# Patient Record
Sex: FEMALE | Race: WHITE | NOT HISPANIC OR LATINO | Employment: OTHER | URBAN - METROPOLITAN AREA
[De-identification: names, ages, dates, MRNs, and addresses within clinical notes are randomized per-mention and may not be internally consistent; named-entity substitution may affect disease eponyms.]

---

## 2017-01-12 DIAGNOSIS — I10 ESSENTIAL HYPERTENSION, BENIGN: Primary | ICD-10-CM

## 2017-01-12 DIAGNOSIS — E78.5 HYPERLIPIDEMIA WITH TARGET LDL LESS THAN 130: ICD-10-CM

## 2017-01-12 RX ORDER — ATORVASTATIN CALCIUM 20 MG/1
20 TABLET, FILM COATED ORAL DAILY
Qty: 30 TABLET | Refills: 0 | Status: SHIPPED | OUTPATIENT
Start: 2017-01-12 | End: 2017-02-10

## 2017-01-12 RX ORDER — LISINOPRIL AND HYDROCHLOROTHIAZIDE 12.5; 2 MG/1; MG/1
2 TABLET ORAL EVERY MORNING
Qty: 60 TABLET | Refills: 0 | Status: SHIPPED | OUTPATIENT
Start: 2017-01-12 | End: 2017-02-10

## 2017-01-12 NOTE — TELEPHONE ENCOUNTER
Medication is being filled for 1 time refill only due to:  Patient needs to be seen because it has been more than one year since last visit.

## 2017-01-12 NOTE — TELEPHONE ENCOUNTER
lisinopril/HCTZ 20-12.5mg      Last Written Prescription Date: 05/18/16  Last Fill Quantity: 180, # refills: 1  Last Office Visit with MARTI, DIRK or [facility] prescribing provider: 10/06/15       POTASSIUM   Date Value Ref Range Status   10/02/2015 3.7 3.4 - 5.3 mmol/L Final     CREATININE   Date Value Ref Range Status   10/02/2015 0.75 0.52 - 1.04 mg/dL Final     BP Readings from Last 3 Encounters:   10/06/15 122/78   07/08/15 142/96   04/20/15 152/90     atorvastatin 20mg     Last Written Prescription Date: 05/18/16  Last Fill Quantity: 90, # refills: 1  Last Office Visit with MARTI, DIRK or [facility] prescribing provider: 10/06/15       CHOL      273   10/2/2015  HDL       50   10/2/2015  LDL      183   10/2/2015  TRIG      200   10/2/2015  CHOLHDLRATIO      5.5   10/2/2015

## 2017-02-10 ENCOUNTER — OFFICE VISIT (OUTPATIENT)
Dept: INTERNAL MEDICINE | Facility: CLINIC | Age: 72
End: 2017-02-10
Payer: MEDICARE

## 2017-02-10 VITALS
DIASTOLIC BLOOD PRESSURE: 76 MMHG | HEART RATE: 116 BPM | HEIGHT: 63 IN | BODY MASS INDEX: 37.69 KG/M2 | OXYGEN SATURATION: 94 % | TEMPERATURE: 98.7 F | SYSTOLIC BLOOD PRESSURE: 130 MMHG | WEIGHT: 212.7 LBS

## 2017-02-10 DIAGNOSIS — Z23 NEED FOR VACCINATION: ICD-10-CM

## 2017-02-10 DIAGNOSIS — I10 ESSENTIAL HYPERTENSION, BENIGN: Primary | ICD-10-CM

## 2017-02-10 DIAGNOSIS — Z13.29 SCREENING FOR THYROID DISORDER: ICD-10-CM

## 2017-02-10 DIAGNOSIS — E78.5 HYPERLIPIDEMIA WITH TARGET LDL LESS THAN 130: ICD-10-CM

## 2017-02-10 PROCEDURE — 99214 OFFICE O/P EST MOD 30 MIN: CPT | Mod: 25 | Performed by: INTERNAL MEDICINE

## 2017-02-10 PROCEDURE — G0009 ADMIN PNEUMOCOCCAL VACCINE: HCPCS | Performed by: INTERNAL MEDICINE

## 2017-02-10 PROCEDURE — 90670 PCV13 VACCINE IM: CPT | Performed by: INTERNAL MEDICINE

## 2017-02-10 PROCEDURE — T1013 SIGN LANG/ORAL INTERPRETER: HCPCS | Mod: U3 | Performed by: INTERNAL MEDICINE

## 2017-02-10 RX ORDER — LISINOPRIL AND HYDROCHLOROTHIAZIDE 12.5; 2 MG/1; MG/1
2 TABLET ORAL EVERY MORNING
Qty: 180 TABLET | Refills: 3 | Status: SHIPPED | OUTPATIENT
Start: 2017-02-10 | End: 2018-03-01

## 2017-02-10 RX ORDER — ATORVASTATIN CALCIUM 20 MG/1
20 TABLET, FILM COATED ORAL DAILY
Qty: 90 TABLET | Refills: 3 | Status: SHIPPED | OUTPATIENT
Start: 2017-02-10 | End: 2018-03-01

## 2017-02-10 NOTE — MR AVS SNAPSHOT
"              After Visit Summary   2/10/2017    Marilia Palma    MRN: [id number]           Patient Information     Date Of Birth          1945        Visit Information        Provider Department      2/10/2017 9:00 AM Melissa Ricks; Chris Bhatia MD [facility]        Today's Diagnoses     Essential hypertension, benign    -  1     Hyperlipidemia with target LDL less than 130         Need for vaccination           Care Instructions    *  Continue all medications at the same doses.  Contact your usual pharmacy if you need refills.     *  Return for a \"lab only appointment\" in the next week or so, sooner if needed.  Please get these labs done in a fasting state with nothing to eat for at least 8 hours prior to getting labs drawn.  I will make contact regarding the results and any recommendations once I have reviewed them.       5 GOALS TO PREVENT VASCULAR DISEASE:     1.  Aggressive blood pressure control, under 130/80 ideally.  Using medications if needed.    Your blood pressure is under good control    BP Readings from Last 4 Encounters:   02/10/17 130/76   10/06/15 122/78   07/08/15 142/96   04/20/15 152/90       2.  Aggressive LDL cholesterol (\"bad cholesterol\") lowering as indicated.    Your goal is an LDL under 130 for sure, preferably under 100.  (If you have diabetes or previous vascular disease, the the LDL goals would be under 100 for sure, preferably under 70.)    New guidelines identify four high-risk groups who could benefit from statins:   *people with pre-existing heart disease, such as those who have had a heart attack;   *people ages 40 to 75 who have diabetes of any type  *patients ages 40 to 75 with at least a 7.5% risk of developing cardiovascular disease over the next decade, according to a formula described in the guidelines  *patients with the sort of super-high cholesterol that sometimes runs in families, as evidenced by an LDL of 190 milligrams per " "deciliter or higher    Your cholesterol levels are well controlled when you take your cholesterol medications.    Recent Labs   Lab Test  10/02/15   0758  07/07/15   0803   CHOL  273*  215*   HDL  50*  48*   LDL  183*  131*   TRIG  200*  178*   CHOLHDLRATIO  5.5*  4.5       3.  Aggressive diabetic prevention, screening and/or management.      You do not have diabetes as of the most recent blood tests.     4.  No smoking    5.  Consider taking low dose aspirin (81 mg) tablet once per day over the age of 50, every day unless there is a specific reason that you cannot take aspirin (such as side effect, allergy, or you are on another \"blood thinner\").        --Based on your current cardiac risk factors, you should take Aspirin 81 mg once per day if you are over 50 years of age.              --Good Grains:  Oats, brown rice, Quinoa (these do not raise the blood sugar as much)     --Bad grains:  Anything made from wheat or white rice     (because these raise the blood sugars significantly, and the possible gluten issue from wheat for some people).      --Proteins:  Aim for \"lean proteins\" including chicken, fish, seafood, pork, turkey, and eggs (in moderation); Eat red meat only occasionally              Follow-ups after your visit        Who to contact     If you have questions or need follow up information about today's clinic visit or your schedule please contact [facility] directly at [phone].  Normal or non-critical lab and imaging results will be communicated to you by MyChart, letter or phone within 4 business days after the clinic has received the results. If you do not hear from us within 7 days, please contact the clinic through MyChart or phone. If you have a critical or abnormal lab result, we will notify you by phone as soon as possible.  Submit refill requests through [employer] or call your pharmacy and they will forward the refill request to us. Please allow 3 business days for " "your refill to be completed.          Additional Information About Your Visit        Gertrudehart Information     [employer]Post lets you send messages to your doctor, view your test results, renew your prescriptions, schedule appointments and more. To sign up, go to [email or web address] . Click on \"Log in\" on the left side of the screen, which will take you to the Welcome page. Then click on \"Sign up Now\" on the right side of the page.     You will be asked to enter the access code listed below, as well as some personal information. Please follow the directions to create your username and password.     Your access code is: [email or web address]  Expires: 2017  9:52 AM     Your access code will  in 90 days. If you need help or a new code, please call your [facility] clinic or [phone].        Care EveryWhere ID     This is your Care EveryWhere ID. This could be used by other organizations to access your [facility] medical records  [id number]        Your Vitals Were     Pulse Temperature Height BMI (Body Mass Index) Pulse Oximetry       116 98.7  F (37.1  C) (Oral) 5' 2.5\" (1.588 m) 38.26 kg/m2 94%        Blood Pressure from Last 3 Encounters:   02/10/17 130/76   10/06/15 122/78   07/08/15 142/96    Weight from Last 3 Encounters:   02/10/17 212 lb 11.2 oz (96.48 kg)   10/06/15 203 lb 8 oz (92.307 kg)   07/08/15 203 lb 6.4 oz (92.262 kg)              We Performed the Following     PNEUMOCOCCAL CONJ VACCINE 13 VALENT IM          Where to get your medicines      These medications were sent to [facility] [address] [place] [address] - [address] [facility]  [address] [facility] [address]     Phone:  [phone]    - atorvastatin 20 MG tablet  - lisinopril-hydrochlorothiazide 20-12.5 MG per tablet       Primary Care Provider Office Phone # Fax #    Chris Bhatia [phone]       [facility] [address] [facility] [address]        Thank you!     Thank you for choosing " [facility]  for your care. Our goal is always to provide you with excellent care. Hearing back from our patients is one way we can continue to improve our services. Please take a few minutes to complete the written survey that you may receive in the mail after your visit with us. Thank you!             Your Updated Medication List - Protect others around you: Learn how to safely use, store and throw away your medicines at www.disposemymeds.org.          This list is accurate as of: 2/10/17  9:52 AM.  Always use your most recent med list.                   Brand Name Dispense Instructions for use    aspirin 81 MG tablet      Take 1 tablet by mouth daily.       atorvastatin 20 MG tablet    LIPITOR    90 tablet    Take 1 tablet (20 mg) by mouth daily       lisinopril-hydrochlorothiazide 20-12.5 MG per tablet    PRINZIDE/ZESTORETIC    180 tablet    Take 2 tablets by mouth every morning

## 2017-02-10 NOTE — PROGRESS NOTES
SUBJECTIVE:                                                    Marilia Palma is a 71 year old female who presents to clinic today for the following health issues:      Patient is deaf, unable to read lips.   Patient seen via sign language interpretor.     Hyperlipidemia Follow-Up      Rate your low fat/cholesterol diet?: good    Taking statin?  Yes, no muscle aches from statin    Other lipid medications/supplements?:  None    Has history of hyperlipidemia.  On statin for this, denies any significant side effects of this medication.      Latest labs reviewed:    Recent Labs   Lab Test  10/02/15   0758  07/07/15   0803   CHOL  273*  215*   HDL  50*  48*   LDL  183*  131*   TRIG  200*  178*   CHOLHDLRATIO  5.5*  4.5        AST       13   10/2/2015       Hypertension Follow-up      Outpatient blood pressures are not being checked.    Low Salt Diet: no added salt    Blood presure remains well controlled at home  Readings outside clinic are within normal limits.  Reviewed last 6 BP readings in chart:  BP Readings from Last 6 Encounters:   02/10/17 130/76   10/06/15 122/78   07/08/15 142/96   04/20/15 152/90   03/20/15 140/100   03/06/15 160/90     He has not experienced any significant side effects from medicaiotns for hypertension.    NO active cardiac complaints or symptoms with exercise.        Amount of exercise or physical activity: 6-7 days/week for an average of active throughout the day, walking and cleaning    Problems taking medications regularly: No    Medication side effects: none    Diet: low salt and low fat/cholesterol        Problem list and histories reviewed & adjusted, as indicated.  Additional history: as documented        Past Medical History:  ---------------------------  Past Medical History   Diagnosis Date     Deaf 1945     Hyperlipidemia LDL goal < 130 11/28/12      pre-treatment     Obesity (BMI 30-39.9) 12/3/12     BMI 37     HTN (hypertension) 2012       Past Surgical  History:  ---------------------------  No past surgical history on file.    Current Medications:  ---------------------------  Current Outpatient Prescriptions   Medication Sig Dispense Refill     lisinopril-hydrochlorothiazide (PRINZIDE/ZESTORETIC) 20-12.5 MG per tablet Take 2 tablets by mouth every morning 180 tablet 3     atorvastatin (LIPITOR) 20 MG tablet Take 1 tablet (20 mg) by mouth daily 90 tablet 3     aspirin 81 MG tablet Take 1 tablet by mouth daily.       [DISCONTINUED] lisinopril-hydrochlorothiazide (PRINZIDE/ZESTORETIC) 20-12.5 MG per tablet Take 2 tablets by mouth every morning 60 tablet 0     [DISCONTINUED] atorvastatin (LIPITOR) 20 MG tablet Take 1 tablet (20 mg) by mouth daily 30 tablet 0       Allergies:  -------------  No Known Allergies    Social History:  -------------------  Social History     Social History     Marital Status:      Spouse Name: N/A     Number of Children: N/A     Years of Education: N/A     Occupational History     Not on file.     Social History Main Topics     Smoking status: Never Smoker      Smokeless tobacco: Never Used     Alcohol Use: No     Drug Use: No     Sexual Activity: No     Other Topics Concern     Not on file     Social History Narrative       Family Medical History:  ------------------------------  Family History   Problem Relation Age of Onset     Family History Negative Mother       natural causes age 90     CEREBROVASCULAR DISEASE Father 65      after atroke     Hypertension Father          ROS:  REVIEW OF SYSTEMS:    RESP: negative for cough, dyspnea, wheezing, hemoptysis  CV: negative for chest pain, palpitations, PND, RAMIREZ, orthopnea; reports no changes in their ability to perform physical activity (from cardiovascular standpoint)  GI: negative for dysphagia, N/V, pain, melena, diarrhea and constipation  NEURO: negative for numbness/tingling, paralysis, incoordination, or focal weakness     OBJECTIVE:                                        "             /76 mmHg  Pulse 116  Temp(Src) 98.7  F (37.1  C) (Oral)  Ht 5' 2.5\" (1.588 m)  Wt 212 lb 11.2 oz (96.48 kg)  BMI 38.26 kg/m2  SpO2 94%     GENERAL alert and no distress  EYES:  Normal sclera,conjunctiva, EOMI  HENT: oral and posterior pharynx without lesions or erythema, facies symmetric  NECK: Neck supple. No LAD, without thyroidmegaly or JVD., Carotids without bruits.  RESP: Clear to ausculation bilaterally without wheezes or crackles. Normal BS in all fields.  CV: RRR normal S1S2 without murmurs, rubs or gallops. PMI normal  LYMPH: no cervical lymph adenopathy appreciated  MS: extremities- no gross deformities of the visible extremities noted, no edema  PSYCH: Alert and oriented times 3; speech- coherent  SKIN:  No obvious significant skin lesions on visible portions of face          ASSESSMENT/PLAN:                                                      (I10) Essential hypertension, benign  (primary encounter diagnosis)  Comment: This condition is currently controlled on the current medical regimen.  Continue current therapy.   Discussed hypertension in detail including JNC VIII guidelines for blood pressure goals.  Discussed indication for treatment and treatment options.  Discussed the importance for aggressive management of HTN to prevent vascular complications later.  Recommended lower fat, lower carbohydrate, and lower sodium (<2000 mg)diet.  Discussed required intervals for follow up on HTN, lab studies, and the need to aggresive management of other cardiac disease risk factors.  Recommened pt. follow their blood pressures outside the clinic to ensure that BPs are remaining within guidelines, and to contact me if the readings are not within guidelines so we can adjust treatment as needed.   Plan: lisinopril-hydrochlorothiazide         (PRINZIDE/ZESTORETIC) 20-12.5 MG per tablet            (E78.5) Hyperlipidemia with target LDL less than 130  Comment: Discussed current lipid results, " previous results (if available) current guidelines (NCEP) for treatment and goals for lipids.  Discussed lifestyle modification, dietary changes (low fat, low simple carb) and regular aerobic exercise.  Discussed the link between dysmetabolic syndrome and impaired glucose tolerance seen in certain patterns of lipids.  Briefly discussed medication used for lipid lowering, including the statins are their possible side effects of myalgias, rhabdomyolysis, and liver toxicity.   Plan: atorvastatin (LIPITOR) 20 MG tablet            (Z23) Need for vaccination  Comment:   Plan: PNEUMOCOCCAL CONJ VACCINE 13 VALENT IM              See Patient Instructions    LANEY SUMMERS M.D., MD  [facility]

## 2017-02-10 NOTE — NURSING NOTE
"Chief Complaint   Patient presents with     Hypertension     med refill     Lipids     med refill       Initial /78 mmHg  Pulse 116  Temp(Src) 98.7  F (37.1  C) (Oral)  Ht 5' 2.5\" (1.588 m)  Wt 212 lb 11.2 oz (96.48 kg)  BMI 38.26 kg/m2  SpO2 94% Estimated body mass index is 38.26 kg/(m^2) as calculated from the following:    Height as of this encounter: 5' 2.5\" (1.588 m).    Weight as of this encounter: 212 lb 11.2 oz (96.48 kg).  Medication Reconciliation: complete   Sandie Wong CMA    Screening Questionnaire for Adult Immunization    Are you sick today?   No   Do you have allergies to medications, food, a vaccine component or latex?   No   Have you ever had a serious reaction after receiving a vaccination?   No   Do you have a long-term health problem with heart disease, lung disease, asthma, kidney disease, metabolic disease (e.g. diabetes), anemia, or other blood disorder?   No   Do you have cancer, leukemia, HIV/AIDS, or any other immune system problem?   No   In the past 3 months, have you taken medications that affect  your immune system, such as prednisone, other steroids, or anticancer drugs; drugs for the treatment of rheumatoid arthritis, Crohn s disease, or psoriasis; or have you had radiation treatments?   No   Have you had a seizure, or a brain or other nervous system problem?   No   During the past year, have you received a transfusion of blood or blood     products, or been given immune (gamma) globulin or antiviral drug?   No   For women: Are you pregnant or is there a chance you could become        pregnant during the next month?   No   Have you received any vaccinations in the past 4 weeks?   No     Immunization questionnaire answers were all negative.      MNVFC doesn't apply on this patient    Per orders of Dr. hilton, injection of prevnar given by Sandie Wong. Patient instructed to remain in clinic for 20 minutes afterwards, and to report any adverse reaction to me " immediately.       Screening performed by Sandie Wong on 2/10/2017 at 9:10 AM.

## 2017-02-10 NOTE — PATIENT INSTRUCTIONS
"*  Continue all medications at the same doses.  Contact your usual pharmacy if you need refills.     *  Return for a \"lab only appointment\" in the next week or so, sooner if needed.  Please get these labs done in a fasting state with nothing to eat for at least 8 hours prior to getting labs drawn.  I will make contact regarding the results and any recommendations once I have reviewed them.       5 GOALS TO PREVENT VASCULAR DISEASE:     1.  Aggressive blood pressure control, under 130/80 ideally.  Using medications if needed.    Your blood pressure is under good control    BP Readings from Last 4 Encounters:   02/10/17 130/76   10/06/15 122/78   07/08/15 142/96   04/20/15 152/90       2.  Aggressive LDL cholesterol (\"bad cholesterol\") lowering as indicated.    Your goal is an LDL under 130 for sure, preferably under 100.  (If you have diabetes or previous vascular disease, the the LDL goals would be under 100 for sure, preferably under 70.)    New guidelines identify four high-risk groups who could benefit from statins:   *people with pre-existing heart disease, such as those who have had a heart attack;   *people ages 40 to 75 who have diabetes of any type  *patients ages 40 to 75 with at least a 7.5% risk of developing cardiovascular disease over the next decade, according to a formula described in the guidelines  *patients with the sort of super-high cholesterol that sometimes runs in families, as evidenced by an LDL of 190 milligrams per deciliter or higher    Your cholesterol levels are well controlled when you take your cholesterol medications.    Recent Labs   Lab Test  10/02/15   0758  07/07/15   0803   CHOL  273*  215*   HDL  50*  48*   LDL  183*  131*   TRIG  200*  178*   CHOLHDLRATIO  5.5*  4.5       3.  Aggressive diabetic prevention, screening and/or management.      You do not have diabetes as of the most recent blood tests.     4.  No smoking    5.  Consider taking low dose aspirin (81 mg) tablet once " "per day over the age of 50, every day unless there is a specific reason that you cannot take aspirin (such as side effect, allergy, or you are on another \"blood thinner\").        --Based on your current cardiac risk factors, you should take Aspirin 81 mg once per day if you are over 50 years of age.              --Good Grains:  Oats, brown rice, Quinoa (these do not raise the blood sugar as much)     --Bad grains:  Anything made from wheat or white rice     (because these raise the blood sugars significantly, and the possible gluten issue from wheat for some people).      --Proteins:  Aim for \"lean proteins\" including chicken, fish, seafood, pork, turkey, and eggs (in moderation); Eat red meat only occasionally        "

## 2017-02-15 DIAGNOSIS — E78.5 HYPERLIPIDEMIA WITH TARGET LDL LESS THAN 130: ICD-10-CM

## 2017-02-15 DIAGNOSIS — Z13.29 SCREENING FOR THYROID DISORDER: ICD-10-CM

## 2017-02-15 DIAGNOSIS — R73.03 PREDIABETES: ICD-10-CM

## 2017-02-15 DIAGNOSIS — I10 ESSENTIAL HYPERTENSION, BENIGN: ICD-10-CM

## 2017-02-15 LAB
ALBUMIN SERPL-MCNC: 4.2 G/DL (ref 3.4–5)
ALP SERPL-CCNC: 90 U/L (ref 40–150)
ALT SERPL W P-5'-P-CCNC: 27 U/L (ref 0–50)
ANION GAP SERPL CALCULATED.3IONS-SCNC: 8 MMOL/L (ref 3–14)
AST SERPL W P-5'-P-CCNC: 19 U/L (ref 0–45)
BILIRUB SERPL-MCNC: 0.5 MG/DL (ref 0.2–1.3)
BUN SERPL-MCNC: 20 MG/DL (ref 7–30)
CALCIUM SERPL-MCNC: 9.3 MG/DL (ref 8.5–10.1)
CHLORIDE SERPL-SCNC: 101 MMOL/L (ref 94–109)
CHOLEST SERPL-MCNC: 167 MG/DL
CO2 SERPL-SCNC: 26 MMOL/L (ref 20–32)
CREAT SERPL-MCNC: 0.77 MG/DL (ref 0.52–1.04)
ERYTHROCYTE [DISTWIDTH] IN BLOOD BY AUTOMATED COUNT: 13 % (ref 10–15)
GFR SERPL CREATININE-BSD FRML MDRD: 73 ML/MIN/1.7M2
GLUCOSE SERPL-MCNC: 134 MG/DL (ref 70–99)
HBA1C MFR BLD: 5.3 % (ref 4.3–6)
HCT VFR BLD AUTO: 37.4 % (ref 35–47)
HDLC SERPL-MCNC: 51 MG/DL
HGB BLD-MCNC: 12.4 G/DL (ref 11.7–15.7)
LDLC SERPL CALC-MCNC: 84 MG/DL
MCH RBC QN AUTO: 30.8 PG (ref 26.5–33)
MCHC RBC AUTO-ENTMCNC: 33.2 G/DL (ref 31.5–36.5)
MCV RBC AUTO: 93 FL (ref 78–100)
NONHDLC SERPL-MCNC: 116 MG/DL
PLATELET # BLD AUTO: 205 10E9/L (ref 150–450)
POTASSIUM SERPL-SCNC: 3.8 MMOL/L (ref 3.4–5.3)
PROT SERPL-MCNC: 7.7 G/DL (ref 6.8–8.8)
RBC # BLD AUTO: 4.03 10E12/L (ref 3.8–5.2)
SODIUM SERPL-SCNC: 135 MMOL/L (ref 133–144)
TRIGL SERPL-MCNC: 160 MG/DL
TSH SERPL DL<=0.005 MIU/L-ACNC: 2.78 MU/L (ref 0.4–4)
WBC # BLD AUTO: 7.1 10E9/L (ref 4–11)

## 2017-02-15 PROCEDURE — 80053 COMPREHEN METABOLIC PANEL: CPT | Performed by: INTERNAL MEDICINE

## 2017-02-15 PROCEDURE — 85027 COMPLETE CBC AUTOMATED: CPT | Performed by: INTERNAL MEDICINE

## 2017-02-15 PROCEDURE — 83036 HEMOGLOBIN GLYCOSYLATED A1C: CPT | Performed by: INTERNAL MEDICINE

## 2017-02-15 PROCEDURE — 84443 ASSAY THYROID STIM HORMONE: CPT | Performed by: INTERNAL MEDICINE

## 2017-02-15 PROCEDURE — 36415 COLL VENOUS BLD VENIPUNCTURE: CPT | Performed by: INTERNAL MEDICINE

## 2017-02-15 PROCEDURE — T1013 SIGN LANG/ORAL INTERPRETER: HCPCS | Mod: U3 | Performed by: INTERNAL MEDICINE

## 2017-02-15 PROCEDURE — 80061 LIPID PANEL: CPT | Performed by: INTERNAL MEDICINE

## 2017-10-27 ENCOUNTER — ALLIED HEALTH/NURSE VISIT (OUTPATIENT)
Dept: NURSING | Facility: CLINIC | Age: 72
End: 2017-10-27
Payer: MEDICARE

## 2017-10-27 DIAGNOSIS — Z23 NEED FOR PROPHYLACTIC VACCINATION AND INOCULATION AGAINST INFLUENZA: Primary | ICD-10-CM

## 2017-10-27 PROCEDURE — G0008 ADMIN INFLUENZA VIRUS VAC: HCPCS

## 2017-10-27 PROCEDURE — 90662 IIV NO PRSV INCREASED AG IM: CPT

## 2017-10-27 NOTE — MR AVS SNAPSHOT
"              After Visit Summary   10/27/2017    Marilia Palma    MRN: [id number]           Patient Information     Date Of Birth          1945        Visit Information        Provider Department      10/27/2017 11:00 AM [facility] - NURSE [facility]        Today's Diagnoses     Need for prophylactic vaccination and inoculation against influenza    -  1       Follow-ups after your visit        Who to contact     If you have questions or need follow up information about today's clinic visit or your schedule please contact [facility] directly at [phone].  Normal or non-critical lab and imaging results will be communicated to you by [employer], letter or phone within 4 business days after the clinic has received the results. If you do not hear from us within 7 days, please contact the clinic through [employer] or phone. If you have a critical or abnormal lab result, we will notify you by phone as soon as possible.  Submit refill requests through I AM AT or call your pharmacy and they will forward the refill request to us. Please allow 3 business days for your refill to be completed.          Additional Information About Your Visit        MyChart Information     I AM AT lets you send messages to your doctor, view your test results, renew your prescriptions, schedule appointments and more. To sign up, go to [email or web address] . Click on \"Log in\" on the left side of the screen, which will take you to the Welcome page. Then click on \"Sign up Now\" on the right side of the page.     You will be asked to enter the access code listed below, as well as some personal information. Please follow the directions to create your username and password.     Your access code is: [email or web address]  Expires: 2018 11:25 AM     Your access code will  in 90 days. If you need help or a new code, please call your [facility] or [phone].        Care EveryWhere ID     " This is your Care EveryWhere ID. This could be used by other organizations to access your [facility] medical records  [id number]         Blood Pressure from Last 3 Encounters:   02/10/17 130/76   10/06/15 122/78   07/08/15 (!) 142/96    Weight from Last 3 Encounters:   02/10/17 212 lb 11.2 oz (96.5 kg)   10/06/15 203 lb 8 oz (92.3 kg)   07/08/15 203 lb 6.4 oz (92.3 kg)              We Performed the Following     FLU VACCINE, INCREASED ANTIGEN, PRESV FREE, AGE 65+ [89944]     Vaccine Administration, Initial [72092]        Primary Care Provider Office Phone # Fax #    Chris Bhatia [phone]       [address] [facility] [address]        Equal Access to Services     DWAINE TORRES : Hadii aad ku hadasho Soomaali, waaxda luqadaha, qaybta kaalmada adeegyada, pari keller haybryanna see . So [facility] [phone].    ATENCIÓN: Si habla español, tiene a lacy disposición servicios gratuitos de asistencia lingüística. Oskarame al [phone].    We comply with applicable federal civil rights laws and [place] laws. We do not discriminate on the basis of race, color, national origin, age, disability, sex, sexual orientation, or gender identity.            Thank you!     Thank you for choosing [facility]  for your care. Our goal is always to provide you with excellent care. Hearing back from our patients is one way we can continue to improve our services. Please take a few minutes to complete the written survey that you may receive in the mail after your visit with us. Thank you!             Your Updated Medication List - Protect others around you: Learn how to safely use, store and throw away your medicines at www.disposemymeds.org.          This list is accurate as of: 10/27/17 11:25 AM.  Always use your most recent med list.                   Brand Name Dispense Instructions for use Diagnosis    aspirin 81 MG tablet      Take 1 tablet by mouth daily.     Hyperlipidemia LDL goal < 130       atorvastatin 20 MG tablet    LIPITOR    90 tablet    Take 1 tablet (20 mg) by mouth daily    Hyperlipidemia with target LDL less than 130       lisinopril-hydrochlorothiazide 20-12.5 MG per tablet    PRINZIDE/ZESTORETIC    180 tablet    Take 2 tablets by mouth every morning    Essential hypertension, benign

## 2017-10-27 NOTE — PROGRESS NOTES

## 2017-12-29 ENCOUNTER — OFFICE VISIT (OUTPATIENT)
Dept: INTERNAL MEDICINE | Facility: CLINIC | Age: 72
End: 2017-12-29
Payer: MEDICARE

## 2017-12-29 VITALS
OXYGEN SATURATION: 94 % | SYSTOLIC BLOOD PRESSURE: 128 MMHG | RESPIRATION RATE: 14 BRPM | HEART RATE: 120 BPM | DIASTOLIC BLOOD PRESSURE: 70 MMHG | BODY MASS INDEX: 37.44 KG/M2 | TEMPERATURE: 98.4 F | WEIGHT: 208 LBS

## 2017-12-29 DIAGNOSIS — J20.9 ACUTE BRONCHITIS WITH SYMPTOMS > 10 DAYS: Primary | ICD-10-CM

## 2017-12-29 PROCEDURE — 99213 OFFICE O/P EST LOW 20 MIN: CPT | Performed by: PHYSICIAN ASSISTANT

## 2017-12-29 RX ORDER — AZITHROMYCIN 250 MG/1
TABLET, FILM COATED ORAL
Qty: 6 TABLET | Refills: 0 | Status: SHIPPED | OUTPATIENT
Start: 2017-12-29 | End: 2018-01-30

## 2017-12-29 NOTE — PROGRESS NOTES
SUBJECTIVE:   Marilia Palma is a 72 year old female who presents to clinic today for the following health issues:  Chief Complaint   Patient presents with     Cough     x 1 month     Due to language barrier, an  was present during the history-taking and subsequent discussion (and for part of the physical exam) with this patient.    RESPIRATORY SYMPTOMS      Duration: x 1 month    Description  rhinorrhea and cough  With phlegm   Clear runny.   No fever chills or sweats.     Severity: mild- feels that she is just not getting better     Accompanying signs and symptoms: None    History (predisposing factors):  none    Precipitating or alleviating factors: None    Therapies tried and outcome:  Mucinex for the cough, did not help             -------------------------------------    Problem list and histories reviewed & adjusted, as indicated.  Additional history: as documented    Labs reviewed in EPIC    Reviewed and updated as needed this visit by clinical staffTobacco  Allergies  Meds       Reviewed and updated as needed this visit by Provider  Allergies  Meds         ROS:  Constitutional, HEENT, cardiovascular, pulmonary, gi and gu systems are negative, except as otherwise noted.      OBJECTIVE:   /70  Pulse 120  Temp 98.4  F (36.9  C) (Oral)  Resp 14  Wt 208 lb (94.3 kg)  SpO2 94%  BMI 37.44 kg/m2  Body mass index is 37.44 kg/(m^2).  GENERAL: healthy, alert and no distress  HENT: normal cephalic/atraumatic, both ears: clear effusion, nose and mouth without ulcers or lesions, rhinorrhea clear, oropharynx clear and oral mucous membranes moist  NECK: no adenopathy, no asymmetry, masses, or scars and thyroid normal to palpation  RESP: bronchial breath sounds upper posterior bilaterally , slight crackle, clears with coughing.   No wheezes   CV: regular rates and rhythm and normal S1 S2, no S3 or S4  SKIN: no suspicious lesions or rashes    Diagnostic Test Results:  none      ASSESSMENT/PLAN:             1. Acute bronchitis with symptoms > 10 days    - azithromycin (ZITHROMAX) 250 MG tablet; Two tablets first day, then one tablet daily for four days.  Dispense: 6 tablet; Refill: 0    Patient Instructions   Continue with fluids.   Use Mucinex to thin mucus and help to bring up phlegm  Antibiotics to treat infection in the bronchial area.        See PCP in 2/2018 for routine followup  Recheck in clinic sooner if not improving with abx course       Gissel Holloway PA-C  [facility]

## 2017-12-29 NOTE — PATIENT INSTRUCTIONS
Continue with fluids.   Use Mucinex to thin mucus and help to bring up phlegm  Antibiotics to treat infection in the bronchial area.

## 2017-12-29 NOTE — MR AVS SNAPSHOT
"              After Visit Summary   12/29/2017    Marilia Palma    MRN: [id number]           Patient Information     Date Of Birth          1945        Visit Information        Provider Department      12/29/2017 3:20 PM Gissel Holloway PA-C; [facility]        Today's Diagnoses     Acute bronchitis with symptoms > 10 days    -  1      Care Instructions    Continue with fluids.   Use Mucinex to thin mucus and help to bring up phlegm  Antibiotics to treat infection in the bronchial area.              Follow-ups after your visit        Who to contact     If you have questions or need follow up information about today's clinic visit or your schedule please contact [facility] directly at [phone].  Normal or non-critical lab and imaging results will be communicated to you by [employer], letter or phone within 4 business days after the clinic has received the results. If you do not hear from us within 7 days, please contact the clinic through MyChart or phone. If you have a critical or abnormal lab result, we will notify you by phone as soon as possible.  Submit refill requests through [employer] or call your pharmacy and they will forward the refill request to us. Please allow 3 business days for your refill to be completed.          Additional Information About Your Visit        [employer] Information     [employer] lets you send messages to your doctor, view your test results, renew your prescriptions, schedule appointments and more. To sign up, go to [email or web address] . Click on \"Log in\" on the left side of the screen, which will take you to the Welcome page. Then click on \"Sign up Now\" on the right side of the page.     You will be asked to enter the access code listed below, as well as some personal information. Please follow the directions to create your username and password.     Your access code is: [email or web address]  Expires: 1/25/2018 10:25 " AM     Your access code will  in 90 days. If you need help or a new code, please call your [facility] clinic or [phone].        Care EveryWhere ID     This is your Care EveryWhere ID. This could be used by other organizations to access your [facility] medical records  [id number]        Your Vitals Were     Pulse Temperature Respirations Pulse Oximetry BMI (Body Mass Index)       120 98.4  F (36.9  C) (Oral) 14 94% 37.44 kg/m2        Blood Pressure from Last 3 Encounters:   17 128/70   02/10/17 130/76   10/06/15 122/78    Weight from Last 3 Encounters:   17 208 lb (94.3 kg)   02/10/17 212 lb 11.2 oz (96.5 kg)   10/06/15 203 lb 8 oz (92.3 kg)              Today, you had the following     No orders found for display         Today's Medication Changes          These changes are accurate as of: 17  3:53 PM.  If you have any questions, ask your nurse or doctor.               Start taking these medicines.        Dose/Directions    azithromycin 250 MG tablet   Commonly known as:  ZITHROMAX   Used for:  Acute bronchitis with symptoms > 10 days   Started by:  Gissel Holloway PA-C        Two tablets first day, then one tablet daily for four days.   Quantity:  6 tablet   Refills:  0            Where to get your medicines      These medications were sent to [facility] [address] [place] [id number]     Phone:  [phone]     azithromycin 250 MG tablet                Primary Care Provider Office Phone # Fax #    Chris Bhatia [phone]       [address] [facility] [address]        Equal Access to Services     ARIEL TORRES : Hadii segundo Reddy, waelda luqadaha, qaybta kaalmada patty, pari mckinney. So [facility] [phone].    ATENCIÓN: Si habla español, tiene a lacy disposición servicios gratuitos de asistencia lingüística. Llame al [phone].    We  comply with applicable federal civil rights laws and [place] laws. We do not discriminate on the basis of race, color, national origin, age, disability, sex, sexual orientation, or gender identity.            Thank you!     Thank you for choosing [facility]  for your care. Our goal is always to provide you with excellent care. Hearing back from our patients is one way we can continue to improve our services. Please take a few minutes to complete the written survey that you may receive in the mail after your visit with us. Thank you!             Your Updated Medication List - Protect others around you: Learn how to safely use, store and throw away your medicines at www.disposemymeds.org.          This list is accurate as of: 12/29/17  3:53 PM.  Always use your most recent med list.                   Brand Name Dispense Instructions for use Diagnosis    aspirin 81 MG tablet      Take 1 tablet by mouth daily.    Hyperlipidemia LDL goal < 130       atorvastatin 20 MG tablet    LIPITOR    90 tablet    Take 1 tablet (20 mg) by mouth daily    Hyperlipidemia with target LDL less than 130       azithromycin 250 MG tablet    ZITHROMAX    6 tablet    Two tablets first day, then one tablet daily for four days.    Acute bronchitis with symptoms > 10 days       lisinopril-hydrochlorothiazide 20-12.5 MG per tablet    PRINZIDE/ZESTORETIC    180 tablet    Take 2 tablets by mouth every morning    Essential hypertension, benign

## 2018-01-29 ENCOUNTER — TELEPHONE (OUTPATIENT)
Dept: NURSING | Facility: CLINIC | Age: 73
End: 2018-01-29

## 2018-01-29 NOTE — TELEPHONE ENCOUNTER
Although I cannot totally say what is causing her symptoms without examining her, her persistent cough is almost certainly a lingering effect from the viral URI that was going around recently.  This particular viral URI led to a lot of coughing that would linger for a few weeks.   (I had this same viral URI and coughed for 6 weeks)  Highly doubt this is from her teeth.     I will evaluate her tomorrow and see if this is the case and I will recommend a treatment as indiacted by her symptoms and exam (usually an inhaler)

## 2018-01-29 NOTE — TELEPHONE ENCOUNTER
Patient's daughter, Yanely, seb. Patient was seen in December and given Zithromax. Still has cough. She thinks gum disease is causing it. Needs dentist but has no dental insurance. Not moving much. Cough not as bad as when saw Gissel. Cough productive of yellow sputum. No fever. Some bleeding from gums. Sour taste. No trouble breathing, no chest pain, no other symptoms. The antibiotic did help. Appointment made with PCP for tomorrow. Please advise if differently. Advised to mask.     Daughter wants to make note that PCP explains to patient what is causing cough. Patient thinks it is d/t her teeth/gums. Wants patient to be aware if it is an infection causing this.     Guideline used:  Telephone Triage Protocols for Nurses, Fifth Edition, Gricelda Baer RN

## 2018-01-30 ENCOUNTER — RADIANT APPOINTMENT (OUTPATIENT)
Dept: GENERAL RADIOLOGY | Facility: CLINIC | Age: 73
End: 2018-01-30
Attending: INTERNAL MEDICINE
Payer: COMMERCIAL

## 2018-01-30 ENCOUNTER — OFFICE VISIT (OUTPATIENT)
Dept: INTERNAL MEDICINE | Facility: CLINIC | Age: 73
End: 2018-01-30
Payer: COMMERCIAL

## 2018-01-30 VITALS
BODY MASS INDEX: 35.79 KG/M2 | DIASTOLIC BLOOD PRESSURE: 66 MMHG | OXYGEN SATURATION: 96 % | SYSTOLIC BLOOD PRESSURE: 102 MMHG | HEART RATE: 124 BPM | TEMPERATURE: 97.2 F | HEIGHT: 63 IN | RESPIRATION RATE: 12 BRPM | WEIGHT: 202 LBS

## 2018-01-30 DIAGNOSIS — K05.6 CHRONIC PERIODONTAL DISEASE: ICD-10-CM

## 2018-01-30 DIAGNOSIS — R05.9 COUGH: ICD-10-CM

## 2018-01-30 DIAGNOSIS — Z13.29 SCREENING FOR THYROID DISORDER: ICD-10-CM

## 2018-01-30 DIAGNOSIS — Z12.31 VISIT FOR SCREENING MAMMOGRAM: ICD-10-CM

## 2018-01-30 DIAGNOSIS — E55.9 VITAMIN D DEFICIENCY: ICD-10-CM

## 2018-01-30 DIAGNOSIS — R73.03 PREDIABETES: ICD-10-CM

## 2018-01-30 DIAGNOSIS — I10 ESSENTIAL HYPERTENSION: ICD-10-CM

## 2018-01-30 DIAGNOSIS — Z91.81 AT RISK FOR FALLING: ICD-10-CM

## 2018-01-30 DIAGNOSIS — E78.5 HYPERLIPIDEMIA WITH TARGET LDL LESS THAN 130: ICD-10-CM

## 2018-01-30 DIAGNOSIS — J98.01 POST-INFECTION BRONCHOSPASM: ICD-10-CM

## 2018-01-30 DIAGNOSIS — J98.01 POST-INFECTION BRONCHOSPASM: Primary | ICD-10-CM

## 2018-01-30 LAB
ALBUMIN SERPL-MCNC: 3.4 G/DL (ref 3.4–5)
ALP SERPL-CCNC: 97 U/L (ref 40–150)
ALT SERPL W P-5'-P-CCNC: 22 U/L (ref 0–50)
ANION GAP SERPL CALCULATED.3IONS-SCNC: 9 MMOL/L (ref 3–14)
AST SERPL W P-5'-P-CCNC: 26 U/L (ref 0–45)
BILIRUB SERPL-MCNC: 0.4 MG/DL (ref 0.2–1.3)
BUN SERPL-MCNC: 24 MG/DL (ref 7–30)
CALCIUM SERPL-MCNC: 9.6 MG/DL (ref 8.5–10.1)
CHLORIDE SERPL-SCNC: 93 MMOL/L (ref 94–109)
CHOLEST SERPL-MCNC: 121 MG/DL
CO2 SERPL-SCNC: 28 MMOL/L (ref 20–32)
CREAT SERPL-MCNC: 0.92 MG/DL (ref 0.52–1.04)
DEPRECATED CALCIDIOL+CALCIFEROL SERPL-MC: 14 UG/L (ref 20–75)
ERYTHROCYTE [DISTWIDTH] IN BLOOD BY AUTOMATED COUNT: 15 % (ref 10–15)
GFR SERPL CREATININE-BSD FRML MDRD: 60 ML/MIN/1.7M2
GLUCOSE SERPL-MCNC: 156 MG/DL (ref 70–99)
HBA1C MFR BLD: 5.8 % (ref 4.3–6)
HCT VFR BLD AUTO: 35.7 % (ref 35–47)
HDLC SERPL-MCNC: 56 MG/DL
HGB BLD-MCNC: 10.8 G/DL (ref 11.7–15.7)
LDLC SERPL CALC-MCNC: 47 MG/DL
MCH RBC QN AUTO: 26.2 PG (ref 26.5–33)
MCHC RBC AUTO-ENTMCNC: 30.3 G/DL (ref 31.5–36.5)
MCV RBC AUTO: 87 FL (ref 78–100)
NONHDLC SERPL-MCNC: 65 MG/DL
PLATELET # BLD AUTO: 310 10E9/L (ref 150–450)
POTASSIUM SERPL-SCNC: 3 MMOL/L (ref 3.4–5.3)
PROT SERPL-MCNC: 8.2 G/DL (ref 6.8–8.8)
RBC # BLD AUTO: 4.12 10E12/L (ref 3.8–5.2)
SODIUM SERPL-SCNC: 130 MMOL/L (ref 133–144)
TRIGL SERPL-MCNC: 91 MG/DL
TSH SERPL DL<=0.005 MIU/L-ACNC: 1.3 MU/L (ref 0.4–4)
WBC # BLD AUTO: 9.1 10E9/L (ref 4–11)

## 2018-01-30 PROCEDURE — 99214 OFFICE O/P EST MOD 30 MIN: CPT | Performed by: INTERNAL MEDICINE

## 2018-01-30 PROCEDURE — 71046 X-RAY EXAM CHEST 2 VIEWS: CPT | Mod: FY

## 2018-01-30 PROCEDURE — 80053 COMPREHEN METABOLIC PANEL: CPT | Performed by: INTERNAL MEDICINE

## 2018-01-30 PROCEDURE — 83036 HEMOGLOBIN GLYCOSYLATED A1C: CPT | Performed by: INTERNAL MEDICINE

## 2018-01-30 PROCEDURE — 80061 LIPID PANEL: CPT | Performed by: INTERNAL MEDICINE

## 2018-01-30 PROCEDURE — 82306 VITAMIN D 25 HYDROXY: CPT | Performed by: INTERNAL MEDICINE

## 2018-01-30 PROCEDURE — 84443 ASSAY THYROID STIM HORMONE: CPT | Performed by: INTERNAL MEDICINE

## 2018-01-30 PROCEDURE — 85027 COMPLETE CBC AUTOMATED: CPT | Performed by: INTERNAL MEDICINE

## 2018-01-30 PROCEDURE — 36415 COLL VENOUS BLD VENIPUNCTURE: CPT | Performed by: INTERNAL MEDICINE

## 2018-01-30 RX ORDER — ALBUTEROL SULFATE 90 UG/1
2 AEROSOL, METERED RESPIRATORY (INHALATION) EVERY 4 HOURS PRN
Qty: 1 INHALER | Refills: 2 | Status: SHIPPED | OUTPATIENT
Start: 2018-01-30 | End: 2019-05-07

## 2018-01-30 RX ORDER — MONTELUKAST SODIUM 10 MG/1
10 TABLET ORAL AT BEDTIME
Qty: 30 TABLET | Refills: 1 | Status: SHIPPED | OUTPATIENT
Start: 2018-01-30 | End: 2019-06-03

## 2018-01-30 NOTE — PROGRESS NOTES
SUBJECTIVE:   Marilia Palma is a 72 year old female who presents to clinic today for the following health issues:      RESPIRATORY SYMPTOMS      Duration: 1 month    Description  cough and bleeding gums    Severity: mild, however her daughter is much more worried about it then the Pt is    Accompanying signs and symptoms: States that she is breathing heavy and her daughter can hear her breathing up stairs    History (predisposing factors):  none    Precipitating or alleviating factors: None    Therapies tried and outcome:  none    She is worried that her cough is caused byt the dentla disease.     Recent URI  with typical URI sx.  At that time, the patient complained of typical URI symptoms: including low grade temps, coryza, nasal stuffiness, congestion, postnasal drip, sore throat, malaise.  Those symptoms have all left and now is having regular cough throughout the day.  Mainly dry cough, but somtimes productive, mainly in the mornings, but the nonproductive during rest of day.  Has some occ wheezing , cough worse at bedtime and in cold or humid air.    Does not have a history of asthma.  Does not smoke.      2.    Blood presure remains well controlled at home  Readings outside clinic are within normal limits.  Reviewed last 6 BP readings in chart:  BP Readings from Last 6 Encounters:   01/30/18 102/66   12/29/17 128/70   02/10/17 130/76   10/06/15 122/78   07/08/15 (!) 142/96   04/20/15 152/90     He has not experienced any significant side effects from medicaiotns for hypertension.    NO active cardiac complaints or symptoms with exercise.     3.  The patient has a history of impaired glucose tolerance with regularly elevated blood sugars.    They have not been diagnosed with type II DM or placed on medications for diabetes before.   They deny polyuria, polydipsia.     The patient is obese with a BMI of Body mass index is 36.36 kg/(m^2)..    Review of current labs show:    Lab Results   Component Value  Date    A1C 5.8 01/30/2018    A1C 5.3 02/15/2017    A1C 5.8 07/07/2015    A1C 5.4 12/18/2012     Lab Results   Component Value Date     01/30/2018     02/15/2017     10/02/2015     07/07/2015     12/18/2012     11/28/2012    GLC 96 08/26/2004          Problem list and histories reviewed & adjusted, as indicated.  Additional history: as documented        Reviewed and updated as needed this visit by clinical staff       Reviewed and updated as needed this visit by Provider           Past Medical History:  ---------------------------  Past Medical History:   Diagnosis Date     Deaf 1945     HTN (hypertension) 2012     Hyperlipidemia LDL goal < 130 11/28/12     pre-treatment     Obesity (BMI 30-39.9) 12/3/12    BMI 37       Past Surgical History:  ---------------------------  No past surgical history on file.    Current Medications:  ---------------------------  Current Outpatient Prescriptions   Medication Sig Dispense Refill     albuterol (PROAIR HFA/PROVENTIL HFA/VENTOLIN HFA) 108 (90 BASE) MCG/ACT Inhaler Inhale 2 puffs into the lungs every 4 hours as needed for shortness of breath / dyspnea or wheezing 1 Inhaler 2     montelukast (SINGULAIR) 10 MG tablet Take 1 tablet (10 mg) by mouth At Bedtime 30 tablet 1     lisinopril-hydrochlorothiazide (PRINZIDE/ZESTORETIC) 20-12.5 MG per tablet Take 2 tablets by mouth every morning 180 tablet 3     atorvastatin (LIPITOR) 20 MG tablet Take 1 tablet (20 mg) by mouth daily 90 tablet 3     aspirin 81 MG tablet Take 1 tablet by mouth daily.         Allergies:  -------------  No Known Allergies    Social History:  -------------------  Social History     Social History     Marital status:      Spouse name: N/A     Number of children: N/A     Years of education: N/A     Occupational History     Not on file.     Social History Main Topics     Smoking status: Never Smoker     Smokeless tobacco: Never Used     Alcohol use No      "Drug use: No     Sexual activity: No     Other Topics Concern     Not on file     Social History Narrative       Family Medical History:  ------------------------------  Family History   Problem Relation Age of Onset     Family History Negative Mother       natural causes age 90     CEREBROVASCULAR DISEASE Father 65      after atroke     Hypertension Father          ROS:  REVIEW OF SYSTEMS:    RESP: negative for cough, dyspnea, wheezing, hemoptysis  CV: negative for chest pain, palpitations, PND, RAMIREZ, orthopnea; reports no changes in their ability to perform physical activity (from cardiovascular standpoint)  GI: negative for dysphagia, N/V, pain, melena, diarrhea and constipation  NEURO: negative for numbness/tingling, paralysis, incoordination, or focal weakness     OBJECTIVE:                                                    /66  Pulse 124  Temp 97.2  F (36.2  C) (Oral)  Resp 12  Ht 5' 2.5\" (1.588 m)  Wt 202 lb (91.6 kg)  SpO2 96%  Breastfeeding? No  BMI 36.36 kg/m2     GENERAL alert and no distress  EYES:  Normal sclera,conjunctiva, EOMI  HENT: oral and posterior pharynx without lesions or erythema, facies symmetric  NECK: Neck supple. No LAD, without thyroidmegaly or JVD., Carotids without bruits.  RESP: Clear to ausculation bilaterally without wheezes or crackles. Normal BS in all fields.  CV: RRR normal S1S2 without murmurs, rubs or gallops. 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 Discussed issues of bronchial " disease/bronchospasm.    Recommended symptomatic treatment with bronchodilator (albuterol) as needed.  Due to the degree of inflammation present in exam, Prednisone was not indicated.  Antibiotics are not indicated based on history and exam findings today.  Continue to treat any congestion as needed with decongestants, any allergic components with nonsedating antihistamine.  If sx. recur or worsen, may need more chronic/regular medication such as Advair or similar.  Patient was instructed to contact us if there is any worsening, changes in symptoms, or no improvement.     Plan: XR Chest 2 Views, albuterol (PROAIR         HFA/PROVENTIL HFA/VENTOLIN HFA) 108 (90 BASE)         CG/ACT Inhaler            (R05) Cough  Comment:   Plan: XR Chest 2 Views, montelukast (SINGULAIR) 10 MG        tablet            (K05.6) Chronic periodontal disease  Comment: needs to see dentist and have her teeth managed   Her coughing is NOT from the dental disease as she suspected.   Plan:     (I10) Essential hypertension  Comment: This condition is currently controlled on the current medical regimen.  Continue current therapy.   Discussed hypertension in detail including JNC and American Heart Association guidelines for blood pressure goals.  Discussed indication for treatment and treatment options.  Discussed the importance for aggressive management of HTN to prevent vascular complications later.  Recommended lower fat, lower carbohydrate, and lower sodium (<2000 mg)diet.  Discussed required intervals for follow up on HTN, lab studies, and the need to aggresive management of other cardiac disease risk factors.  Recommened pt. follow their blood pressures outside the clinic to ensure that BPs are remaining within guidelines, and to contact me if the readings are not within guidelines so we can adjust treatment as needed.   Plan: CBC with platelets, Comprehensive metabolic         panel            (Z91.81) At risk for falling  Comment:  "  Plan:     (E78.5) Hyperlipidemia with target LDL less than 130  Comment: Discussed current lipid results, previous results (if available) current guidelines (NCEP) for treatment and goals for lipids.  Discussed lifestyle modification, dietary changes (low fat, low simple carb) and regular aerobic exercise.  Discussed the link between dysmetabolic syndrome and impaired glucose tolerance seen in certain patterns of lipids.  Briefly discussed medication used for lipid lowering, including the statins are their possible side effects of myalgias, rhabdomyolysis, and liver toxicity.   Plan: Comprehensive metabolic panel, Lipid panel         reflex to direct LDL Fasting            (Z12.31) Visit for screening mammogram  Comment:   Plan:     (R73.03) Prediabetes  Comment: Reviewed the labs showing elevated glucose levels.    Discussed \"pre-diabetes\", impaired glucose tolerance, and its part in the dysmetabolic syndrome.    Discussed the inevitable progression of impaired glucose tolerance toward worsening diabetes mellitus and the need for agressive interventions now to delay and prevent this inevitable progression.  Discussed the overall risks that dysmetabolic syndromes/impaired glucose tolerance/syndrome X pose toward increased risks of vascular disease as the main reason for agressive intervention now.  Will add medications for glucose control (i.e. metformin, glitazones, etc), lipid (e.g. statins), and for blood pressure (preferably ARBs and ACE) as indicated.  Will start these as early as needed based other proven ability to delay and modify these risk factors.   Discussed the need for aggressive diet control as the cornerstone of pre-diabetes and diabetes management, emphasizing the reduction of \"simple carbohydrates\" (e.g. Any kind of wheat products (e.g. any bread, any pasta), white rice, noodles, potatoes, snack foods, regular soda, juices (except fresh squeezed), cakes, cookies, candy, etc.) along with regular " exercise.       Plan: Comprehensive metabolic panel, **A1C FUTURE 6mo            (Z13.29) Screening for thyroid disorder  Comment:   Plan: TSH with free T4 reflex            (E55.9) Vitamin D deficiency  Comment:   Plan: Vitamin D Deficiency              See Patient Instructions    LANEY SUMMERS M.D., MD  [facility]

## 2018-01-30 NOTE — MR AVS SNAPSHOT
"              After Visit Summary   1/30/2018    Marilia Palma    MRN: [id number]           Patient Information     Date Of Birth          1945        Visit Information        Provider Department      1/30/2018 10:20 AM Chris Bhatia MD; [facility]        Today's Diagnoses     Post-infection bronchospasm    -  1    Visit for screening mammogram        At risk for falling        Cough        Essential hypertension        Hyperlipidemia with target LDL less than 130        Prediabetes        Screening for thyroid disorder        Vitamin D deficiency          Care Instructions    *  The bleeding gums are from periodontal disease (gum disease).  It is VERY important that you go and see your dentist to evaluate your teeth and gums.          Post infectious Bronchospasm (wheezing):  ===============================================      *  Bronchospasm is irritation of the airways that causes them to spasm and temporarily \"narrow\" which causes coughing (usually minimal sputum production), shortness of breath, dyspnea on exertion, wheezing.  Your airways will be more reactive to things such as temperature changes (too cold, too hot, too humid, etc.), activity, pollutants such as dander, smoke, air pollution, etc.  This will get better with time, but will produce lingering symptoms as described above for a couple weeks to a couple of months.     *  Antibiotics not indicated, no evidence for infection.  Chest xray was without evidence for pnuemonia or cancer.     *  Albuterol inhaler, use 2 puffs, 3-5 times per day as needed for any coughing, wheezing, tightness in the breathing, or shortness of breath.  This inhaled medication helps reduce the inflammation and spasm of the airways which will help the breathing become easier.  This is a similar medication we use to treat asthma, but you do not have asthma.      Consider using this inhaler before any known triggers for our " "coughing or wheezing (usually these include cold or hot temperatures, humidity, dust, air pollutants, smoke).      *  Expect that your airways may remain more easily irritated for a few weeks after upper respiratory infections.     If you require the albuterol rescue inhaler on a regular basis more than a few times per week, you may need additional medications to help control the breathing better.    These are the available forms of Albuterol, your insurance formulary will determine which one is preferred.  They all work the same.            *  MONTELUKAST (GENERIC SINGULAIR):  Take Montelukast (generic Singulair) 10 mg once per day every day during your main allergy and/or asthma season(s).  This medication will help reduce the inflammation inside your airways and thereby help reduce your asthma and allergy season.  It is not a \"rescue medication\" and will not get you out of acute troubles, always use your rescue albuterol inhaler if you develop any acute breathing troubles.      *  Cough suppressant Delsym, follow directions on bottle    *  Mucinex extended release, one or two tablets twice per day for the next 5-7 days.  This helps loosen the secretions to they can be passed more easily out of the body.     *  Be sure to drink lots of fluids.  If the appetite and intake of food is way down, then at leat try to eat soup.  Dehydration is the thing that causes people to end up in the hospital with viral infections and the flu.     *  For sore throat:  Try lozenges (Cepostat, Cepacol, hard candies, Zinc lozenges, etc)    *  If you have thick secretions:  Mucinex extended release twice per day on a regular basis for the next few days, then twice per day as needed.  OK to take the regular Mucinex, you may just have to take it 2-3 times per day.      *  If you have dry nasal passages or frequent bloody noses during the winter time:  Saline nasal spray as often as needed for dry nose.     *  If you have a lot of " congestion and/or runny noses:  OK to try decongestants as needed (e.g. pseudoephedrine or phenylephrine).  Be sure to take the lowest dose needed.  Take decongestants from only ONE source.  It is possible to inadvertantly take more than the recommended amounts if you take decongestants from multiple products (i.e. Do NOT take Mucinex-D and Claritin-D together)    *  If you have been told to NOT take decongestants or if you cannot tolerate decongestants due to effect on blood pressure, sleep or heart rate:  Try Coricidin HBP or Chlortrimeton (chlorpheniramine).  These can have a similar effect as some decongestants but will not affect your heart rate or blood pressure.      *  If you have SEVERE nasal congestion:  Affrin nasal spray as needed for severe nasal congestion (especially before the airplane trip), but do not use for more than one week.      *  Tylenol as needed for any headaches of low grade temperatures.     *  Ibuprofen as needed for body aches, fevers, headaches    *  Call the clinic if any changes in the symptoms such as worsening fevers, or the amount and quality of the sputum changes, or if you have any major difficulties breathing, or the symptoms fail to clear for a few weeks.    *  Most upper respiratory infection will clear 1-2 weeks, but it is not uncommon for post viral coughs to last for several weeks, even rarely the entire winter.          BRONCHOSPASM (AIRWAY SPASM):               Follow-ups after your visit        Your next 10 appointments already scheduled     Feb 15, 2018  8:00 AM [facility]   LAB with [facility]   [facility]    [address] [place] [id number] [phone]           Please do not eat 10-12 hours before your appointment if you are coming in fasting for labs on lipids, cholesterol, or glucose (sugar). This does not apply to pregnant women. Water, hot tea and black coffee (with nothing added) are  "okay. Do not drink other fluids, diet soda or chew gum.            2018  9:00 AM CST   PHYSICAL with Chris Bhatia MD   [facility] ([facility])    [address] [id number] [phone]              Who to contact     If you have questions or need follow up information about today's clinic visit or your schedule please contact [facility] directly at [phone].  Normal or non-critical lab and imaging results will be communicated to you by Pcssohart, letter or phone within 4 business days after the clinic has received the results. If you do not hear from us within 7 days, please contact the clinic through Pcssohart or phone. If you have a critical or abnormal lab result, we will notify you by phone as soon as possible.  Submit refill requests through Jet[employer]road or call your pharmacy and they will forward the refill request to us. Please allow 3 business days for your refill to be completed.          Additional Information About Your Visit        Jet[employer] Information     [employer]road lets you send messages to your doctor, view your test results, renew your prescriptions, schedule appointments and more. To sign up, go to [email or web address] . Click on \"Log in\" on the left side of the screen, which will take you to the Welcome page. Then click on \"Sign up Now\" on the right side of the page.     You will be asked to enter the access code listed below, as well as some personal information. Please follow the directions to create your username and password.     Your access code is: [email or web address]  Expires: 2018 11:59 AM     Your access code will  in 90 days. If you need help or a new code, please call your [facility] or [phone].        Care EveryWhere ID     This is your Care EveryWhere ID. This could be used by other organizations to access your [facility] medical records  [id number]        Your " "Vitals Were     Pulse Temperature Respirations Height Pulse Oximetry Breastfeeding?    124 97.2  F (36.2  C) (Oral) 12 5' 2.5\" (1.588 m) 96% No    BMI (Body Mass Index)                   36.36 kg/m2            Blood Pressure from Last 3 Encounters:   01/30/18 102/66   12/29/17 128/70   02/10/17 130/76    Weight from Last 3 Encounters:   01/30/18 202 lb (91.6 kg)   12/29/17 208 lb (94.3 kg)   02/10/17 212 lb 11.2 oz (96.5 kg)              We Performed the Following     **A1C FUTURE 6mo     CBC with platelets     Comprehensive metabolic panel     Lipid panel reflex to direct LDL Fasting     TSH with free T4 reflex     Vitamin D Deficiency          Today's Medication Changes          These changes are accurate as of 1/30/18 11:59 AM.  If you have any questions, ask your nurse or doctor.               Start taking these medicines.        Dose/Directions    albuterol 108 (90 BASE) MCG/ACT Inhaler   Commonly known as:  PROAIR HFA/PROVENTIL HFA/VENTOLIN HFA   Used for:  Post-infection bronchospasm   Started by:  Chris Bhatia MD        Dose:  2 puff   Inhale 2 puffs into the lungs every 4 hours as needed for shortness of breath / dyspnea or wheezing   Quantity:  1 Inhaler   Refills:  2       montelukast 10 MG tablet   Commonly known as:  SINGULAIR   Used for:  Cough   Started by:  Chris Bhatia MD        Dose:  10 mg   Take 1 tablet (10 mg) by mouth At Bedtime   Quantity:  30 tablet   Refills:  1            Where to get your medicines      These medications were sent to [facility] [address] [place] [address] [facility]  [address] [facility] [address]     Phone:  [phone]     albuterol 108 (90 BASE) MCG/ACT Inhaler    montelukast 10 MG tablet                Primary Care Provider Office Phone # Fax #    Chris Bhatia [phone]       [address] [facility] [address]        Equal Access to Services     DWAINE TORRES AH: Levar mata " Soedilberto, waelda luqadaha, qaybta kaalmada patty, pari mustafa sherijennifer laevaadan hank. So [facility] [phone].    ATENCIÓN: Si merlin cruz, tiene a lacy disposición servicios gratuitos de asistencia lingüística. Brigitte al [phone].    We comply with applicable federal civil rights laws and [place] laws. We do not discriminate on the basis of race, color, national origin, age, disability, sex, sexual orientation, or gender identity.            Thank you!     Thank you for choosing [facility]  for your care. Our goal is always to provide you with excellent care. Hearing back from our patients is one way we can continue to improve our services. Please take a few minutes to complete the written survey that you may receive in the mail after your visit with us. Thank you!             Your Updated Medication List - Protect others around you: Learn how to safely use, store and throw away your medicines at www.disposemymeds.org.          This list is accurate as of 1/30/18 11:59 AM.  Always use your most recent med list.                   Brand Name Dispense Instructions for use Diagnosis    albuterol 108 (90 BASE) MCG/ACT Inhaler    PROAIR HFA/PROVENTIL HFA/VENTOLIN HFA    1 Inhaler    Inhale 2 puffs into the lungs every 4 hours as needed for shortness of breath / dyspnea or wheezing    Post-infection bronchospasm       aspirin 81 MG tablet      Take 1 tablet by mouth daily.    Hyperlipidemia LDL goal < 130       atorvastatin 20 MG tablet    LIPITOR    90 tablet    Take 1 tablet (20 mg) by mouth daily    Hyperlipidemia with target LDL less than 130       lisinopril-hydrochlorothiazide 20-12.5 MG per tablet    PRINZIDE/ZESTORETIC    180 tablet    Take 2 tablets by mouth every morning    Essential hypertension, benign       montelukast 10 MG tablet    SINGULAIR    30 tablet    Take 1 tablet (10 mg) by mouth At Bedtime    Cough

## 2018-01-30 NOTE — PATIENT INSTRUCTIONS
"*  The bleeding gums are from periodontal disease (gum disease).  It is VERY important that you go and see your dentist to evaluate your teeth and gums.          Post infectious Bronchospasm (wheezing):  ===============================================      *  Bronchospasm is irritation of the airways that causes them to spasm and temporarily \"narrow\" which causes coughing (usually minimal sputum production), shortness of breath, dyspnea on exertion, wheezing.  Your airways will be more reactive to things such as temperature changes (too cold, too hot, too humid, etc.), activity, pollutants such as dander, smoke, air pollution, etc.  This will get better with time, but will produce lingering symptoms as described above for a couple weeks to a couple of months.     *  Antibiotics not indicated, no evidence for infection.  Chest xray was without evidence for pnuemonia or cancer.     *  Albuterol inhaler, use 2 puffs, 3-5 times per day as needed for any coughing, wheezing, tightness in the breathing, or shortness of breath.  This inhaled medication helps reduce the inflammation and spasm of the airways which will help the breathing become easier.  This is a similar medication we use to treat asthma, but you do not have asthma.      Consider using this inhaler before any known triggers for our coughing or wheezing (usually these include cold or hot temperatures, humidity, dust, air pollutants, smoke).      *  Expect that your airways may remain more easily irritated for a few weeks after upper respiratory infections.     If you require the albuterol rescue inhaler on a regular basis more than a few times per week, you may need additional medications to help control the breathing better.    These are the available forms of Albuterol, your insurance formulary will determine which one is preferred.  They all work the same.            *  MONTELUKAST (GENERIC SINGULAIR):  Take Montelukast (generic Singulair) 10 mg once per " "day every day during your main allergy and/or asthma season(s).  This medication will help reduce the inflammation inside your airways and thereby help reduce your asthma and allergy season.  It is not a \"rescue medication\" and will not get you out of acute troubles, always use your rescue albuterol inhaler if you develop any acute breathing troubles.      *  Cough suppressant Delsym, follow directions on bottle    *  Mucinex extended release, one or two tablets twice per day for the next 5-7 days.  This helps loosen the secretions to they can be passed more easily out of the body.     *  Be sure to drink lots of fluids.  If the appetite and intake of food is way down, then at leat try to eat soup.  Dehydration is the thing that causes people to end up in the hospital with viral infections and the flu.     *  For sore throat:  Try lozenges (Cepostat, Cepacol, hard candies, Zinc lozenges, etc)    *  If you have thick secretions:  Mucinex extended release twice per day on a regular basis for the next few days, then twice per day as needed.  OK to take the regular Mucinex, you may just have to take it 2-3 times per day.      *  If you have dry nasal passages or frequent bloody noses during the winter time:  Saline nasal spray as often as needed for dry nose.     *  If you have a lot of congestion and/or runny noses:  OK to try decongestants as needed (e.g. pseudoephedrine or phenylephrine).  Be sure to take the lowest dose needed.  Take decongestants from only ONE source.  It is possible to inadvertantly take more than the recommended amounts if you take decongestants from multiple products (i.e. Do NOT take Mucinex-D and Claritin-D together)    *  If you have been told to NOT take decongestants or if you cannot tolerate decongestants due to effect on blood pressure, sleep or heart rate:  Try Coricidin HBP or Chlortrimeton (chlorpheniramine).  These can have a similar effect as some decongestants but will not affect " your heart rate or blood pressure.      *  If you have SEVERE nasal congestion:  Affrin nasal spray as needed for severe nasal congestion (especially before the airplane trip), but do not use for more than one week.      *  Tylenol as needed for any headaches of low grade temperatures.     *  Ibuprofen as needed for body aches, fevers, headaches    *  Call the clinic if any changes in the symptoms such as worsening fevers, or the amount and quality of the sputum changes, or if you have any major difficulties breathing, or the symptoms fail to clear for a few weeks.    *  Most upper respiratory infection will clear 1-2 weeks, but it is not uncommon for post viral coughs to last for several weeks, even rarely the entire winter.          BRONCHOSPASM (AIRWAY SPASM):

## 2018-02-08 ENCOUNTER — DOCUMENTATION ONLY (OUTPATIENT)
Dept: LAB | Facility: CLINIC | Age: 73
End: 2018-02-08

## 2018-02-08 DIAGNOSIS — E87.1 HYPONATREMIA: ICD-10-CM

## 2018-02-08 DIAGNOSIS — D50.8 OTHER IRON DEFICIENCY ANEMIA: ICD-10-CM

## 2018-02-08 DIAGNOSIS — E78.5 HYPERLIPIDEMIA WITH TARGET LDL LESS THAN 130: ICD-10-CM

## 2018-02-08 DIAGNOSIS — I10 ESSENTIAL HYPERTENSION, BENIGN: Primary | ICD-10-CM

## 2018-02-15 DIAGNOSIS — E87.1 HYPONATREMIA: ICD-10-CM

## 2018-02-15 DIAGNOSIS — I10 ESSENTIAL HYPERTENSION, BENIGN: ICD-10-CM

## 2018-02-15 DIAGNOSIS — D50.8 OTHER IRON DEFICIENCY ANEMIA: ICD-10-CM

## 2018-02-15 DIAGNOSIS — E78.5 HYPERLIPIDEMIA WITH TARGET LDL LESS THAN 130: ICD-10-CM

## 2018-02-15 LAB
ANION GAP SERPL CALCULATED.3IONS-SCNC: 8 MMOL/L (ref 3–14)
BUN SERPL-MCNC: 15 MG/DL (ref 7–30)
CALCIUM SERPL-MCNC: 9.6 MG/DL (ref 8.5–10.1)
CHLORIDE SERPL-SCNC: 98 MMOL/L (ref 94–109)
CO2 SERPL-SCNC: 29 MMOL/L (ref 20–32)
CREAT SERPL-MCNC: 0.73 MG/DL (ref 0.52–1.04)
ERYTHROCYTE [DISTWIDTH] IN BLOOD BY AUTOMATED COUNT: 15.3 % (ref 10–15)
GFR SERPL CREATININE-BSD FRML MDRD: 78 ML/MIN/1.7M2
GLUCOSE SERPL-MCNC: 126 MG/DL (ref 70–99)
HCT VFR BLD AUTO: 32.3 % (ref 35–47)
HGB BLD-MCNC: 9.6 G/DL (ref 11.7–15.7)
IRON SATN MFR SERPL: 13 % (ref 15–46)
IRON SERPL-MCNC: 27 UG/DL (ref 35–180)
MCH RBC QN AUTO: 26.3 PG (ref 26.5–33)
MCHC RBC AUTO-ENTMCNC: 29.7 G/DL (ref 31.5–36.5)
MCV RBC AUTO: 89 FL (ref 78–100)
PLATELET # BLD AUTO: 281 10E9/L (ref 150–450)
POTASSIUM SERPL-SCNC: 3.7 MMOL/L (ref 3.4–5.3)
RBC # BLD AUTO: 3.65 10E12/L (ref 3.8–5.2)
SODIUM SERPL-SCNC: 135 MMOL/L (ref 133–144)
TIBC SERPL-MCNC: 212 UG/DL (ref 240–430)
WBC # BLD AUTO: 8.3 10E9/L (ref 4–11)

## 2018-02-15 PROCEDURE — 80048 BASIC METABOLIC PNL TOTAL CA: CPT | Performed by: INTERNAL MEDICINE

## 2018-02-15 PROCEDURE — 83540 ASSAY OF IRON: CPT | Performed by: INTERNAL MEDICINE

## 2018-02-15 PROCEDURE — 85027 COMPLETE CBC AUTOMATED: CPT | Performed by: INTERNAL MEDICINE

## 2018-02-15 PROCEDURE — 83550 IRON BINDING TEST: CPT | Performed by: INTERNAL MEDICINE

## 2018-02-15 PROCEDURE — 36415 COLL VENOUS BLD VENIPUNCTURE: CPT | Performed by: INTERNAL MEDICINE

## 2018-02-20 ENCOUNTER — OFFICE VISIT (OUTPATIENT)
Dept: INTERNAL MEDICINE | Facility: CLINIC | Age: 73
End: 2018-02-20
Payer: COMMERCIAL

## 2018-02-20 VITALS
DIASTOLIC BLOOD PRESSURE: 82 MMHG | WEIGHT: 201.8 LBS | SYSTOLIC BLOOD PRESSURE: 114 MMHG | HEART RATE: 111 BPM | BODY MASS INDEX: 35.75 KG/M2 | HEIGHT: 63 IN | RESPIRATION RATE: 19 BRPM | TEMPERATURE: 98.1 F | OXYGEN SATURATION: 98 %

## 2018-02-20 DIAGNOSIS — E66.9 OBESITY (BMI 30-39.9): ICD-10-CM

## 2018-02-20 DIAGNOSIS — Z12.31 VISIT FOR SCREENING MAMMOGRAM: ICD-10-CM

## 2018-02-20 DIAGNOSIS — Z23 NEED FOR PROPHYLACTIC VACCINATION AGAINST STREPTOCOCCUS PNEUMONIAE (PNEUMOCOCCUS): ICD-10-CM

## 2018-02-20 DIAGNOSIS — Z12.11 SCREEN FOR COLON CANCER: ICD-10-CM

## 2018-02-20 DIAGNOSIS — E78.5 HYPERLIPIDEMIA WITH TARGET LDL LESS THAN 130: ICD-10-CM

## 2018-02-20 DIAGNOSIS — Z78.0 ASYMPTOMATIC POSTMENOPAUSAL STATUS: ICD-10-CM

## 2018-02-20 DIAGNOSIS — Z12.31 ENCOUNTER FOR SCREENING MAMMOGRAM FOR MALIGNANT NEOPLASM OF BREAST: ICD-10-CM

## 2018-02-20 DIAGNOSIS — Z13.820 SCREENING FOR OSTEOPOROSIS: ICD-10-CM

## 2018-02-20 DIAGNOSIS — D50.8 OTHER IRON DEFICIENCY ANEMIA: ICD-10-CM

## 2018-02-20 DIAGNOSIS — Z00.00 MEDICARE ANNUAL WELLNESS VISIT, SUBSEQUENT: Primary | ICD-10-CM

## 2018-02-20 DIAGNOSIS — H26.9 CATARACT OF RIGHT EYE, UNSPECIFIED CATARACT TYPE: ICD-10-CM

## 2018-02-20 DIAGNOSIS — I10 ESSENTIAL HYPERTENSION: ICD-10-CM

## 2018-02-20 DIAGNOSIS — Z12.11 SCREENING FOR COLON CANCER: ICD-10-CM

## 2018-02-20 PROCEDURE — 90732 PPSV23 VACC 2 YRS+ SUBQ/IM: CPT | Performed by: INTERNAL MEDICINE

## 2018-02-20 PROCEDURE — 99214 OFFICE O/P EST MOD 30 MIN: CPT | Mod: 25 | Performed by: INTERNAL MEDICINE

## 2018-02-20 PROCEDURE — T1013 SIGN LANG/ORAL INTERPRETER: HCPCS | Mod: U3 | Performed by: INTERNAL MEDICINE

## 2018-02-20 PROCEDURE — 99397 PER PM REEVAL EST PAT 65+ YR: CPT | Mod: 25 | Performed by: INTERNAL MEDICINE

## 2018-02-20 PROCEDURE — G0009 ADMIN PNEUMOCOCCAL VACCINE: HCPCS | Performed by: INTERNAL MEDICINE

## 2018-02-20 ASSESSMENT — ACTIVITIES OF DAILY LIVING (ADL)
I_NEED_ASSISTANCE_FOR_THE_FOLLOWING_DAILY_ACTIVITIES:: NO ASSISTANCE IS NEEDED
CURRENT_FUNCTION: NO ASSISTANCE NEEDED

## 2018-02-20 NOTE — MR AVS SNAPSHOT
"              After Visit Summary   2/20/2018    Marilia Palma    MRN: [id number]           Patient Information     Date Of Birth          1945        Visit Information        Provider Department      2/20/2018 9:00 AM Melissa Ricks; Chris Bhatia MD [facility]        Today's Diagnoses     Medicare annual wellness visit, subsequent    -  1    Other iron deficiency anemia        Screening for colon cancer        Encounter for screening mammogram for malignant neoplasm of breast        Screen for colon cancer        Asymptomatic postmenopausal status        Visit for screening mammogram        Need for prophylactic vaccination against Streptococcus pneumoniae (pneumococcus)        Essential hypertension        Obesity (BMI 30-39.9)        Hyperlipidemia with target LDL less than 130        Screening for osteoporosis        Cataract of right eye, unspecified cataract type          Care Instructions      *  Your red blood cell count is lower than desired.  We need to investigate as to why this is happening.     *  Colonoscopy and upper endoscopy at [facility] to evaluate the stomach, esophagus, colon for possible sources of \"silent blood loss\"     --[facility] GI procedure  [phone]      *  Return to see me after the colonoscopy and upper endoscopy procedures are performed.      *  Mammogram at your convenience    *  Bone density scan ( DEXA) to check bone density.     *  Your labs indicated a low Vitamin D level.  While the complete clinical significance of Vitamin D levels still remains to be determined, there is enough data to recommend supplementation whenever lower values are seen as it has been shown to help bone health, immune system function, and treat seasonal affective disorder.     *  I would recommend taking Vitamin D 4000 units per day (2 x 2000 unit capsules), which you can get at any pharmacy and most grocery " stores (the cheapest prices are at [employer]).  We can repeat the levels the next time we do labs, there is no need to do it any earlier.     *  [facility] [place] [phone] [email or web address] to evaluate the cataract.        Preventive Health Recommendations    Female Ages 65 +    Yearly exam:     See your health care provider every year in order to  o Review health changes.   o Discuss preventive care.    o Review your medicines if your doctor has prescribed any.      You no longer need a yearly Pap test unless you've had an abnormal Pap test in the past 10 years. If you have vaginal symptoms, such as bleeding or discharge, be sure to talk with your provider about a Pap test.      Every 1 to 2 years, have a mammogram.  If you are over 69, talk with your health care provider about whether or not you want to continue having screening mammograms.      Every 10 years, have a colonoscopy. Or, have a yearly FIT test (stool test). These exams will check for colon cancer.       Have a cholesterol test every 5 years, or more often if your doctor advises it.       Have a diabetes test (fasting glucose) every three years. If you are at risk for diabetes, you should have this test more often.       At age 65, have a bone density scan (DEXA) to check for osteoporosis (brittle bone disease).    Shots:    Get a flu shot each year.    Get a tetanus shot every 10 years.    Talk to your doctor about your pneumonia vaccines. There are now two you should receive - Pneumovax (PPSV 23) and Prevnar (PCV 13).    Talk to your doctor about the shingles vaccine.    Talk to your doctor about the hepatitis B vaccine.    Nutrition:     Eat at least 5 servings of fruits and vegetables each day.      Eat whole-grain bread, whole-wheat pasta and brown rice instead of white grains and rice.      Talk to your provider about Calcium and Vitamin D.        --Good Grains:  Oats, brown rice, Quinoa  "(these do not raise the blood sugar as much)     --Bad grains:  Anything made from wheat or white rice     (because these raise the blood sugars significantly, and the possible gluten issue from wheat for some people).      --Proteins:  Aim for \"lean proteins\" including chicken, fish, seafood, pork, turkey, and eggs (in moderation); Eat red meat only occasionally        Lifestyle    Exercise at least 150 minutes a week (30 minutes a day, 5 days a week). This will help you control your weight and prevent disease.      Limit alcohol to one drink per day.      No smoking.       Wear sunscreen to prevent skin cancer.       See your dentist twice a year for an exam and cleaning.      See your eye doctor every 1 to 2 years to screen for conditions such as glaucoma, macular degeneration and cataracts.          Follow-ups after your visit        Additional Services     GASTROENTEROLOGY ADULT REF PROCEDURE ONLY [facility] [phone]; No Provider Preference       Last Lab Result: Creatinine (mg/dL)       Date                     Value                 02/15/2018               0.73             ----------  There is no height or weight on file to calculate BMI.     Needed:  Yes  Language:  American Sign Language    Patient will be contacted to schedule procedure.     Please be aware that coverage of these services is subject to the terms and limitations of your health insurance plan.  Call member services at your health plan with any benefit or coverage questions.  Any procedures must be performed at a [place] facility OR coordinated by your clinic's referral office.    Please bring the following with you to your appointment:    (1) Any X-Rays, CTs or MRIs which have been performed.  Contact the facility where they were done to arrange for  prior to your scheduled appointment.    (2) List of current medications   (3) This referral request   (4) Any documents/labs given to you for this referral      "       OPHTHALMOLOGY ADULT REFERRAL       Your provider has referred you to: [facility]: [facility] [phone] [email or web address]    Please be aware that coverage of these services is subject to the terms and limitations of your health insurance plan.  Call member services at your health plan with any benefit or coverage questions.      Please bring the following with you to your appointment:    (1) Any X-Rays, CTs or MRIs which have been performed.  Contact the facility where they were done to arrange for  prior to your scheduled appointment.    (2) List of current medications  (3) This referral request   (4) Any documents/labs given to you for this referral                  Future tests that were ordered for you today     Open Future Orders        Priority Expected Expires Ordered    Dexa Hip /Pelvis /Spine (VTK2677) Routine  6/20/2018 2/20/2018    MA SCREENING DIGITAL BILAT - Future  (s+30) Routine  2/20/2019 2/20/2018            Who to contact     If you have questions or need follow up information about today's clinic visit or your schedule please contact [facility] directly at [phone].  Normal or non-critical lab and imaging results will be communicated to you by MyChart, letter or phone within 4 business days after the clinic has received the results. If you do not hear from us within 7 days, please contact the clinic through MyChart or phone. If you have a critical or abnormal lab result, we will notify you by phone as soon as possible.  Submit refill requests through [employer] or call your pharmacy and they will forward the refill request to us. Please allow 3 business days for your refill to be completed.          Additional Information About Your Visit        [employer] Information     [employer] lets you send messages to your doctor, view your test results, renew your prescriptions, schedule appointments and more. To sign up, go to  [email or web address] . Click on \"Log in\" on the left side of the screen, which will take you to the Welcome page. Then click on \"Sign up Now\" on the right side of the page.     You will be asked to enter the access code listed below, as well as some personal information. Please follow the directions to create your username and password.     Your access code is: [email or web address]  Expires: 2018 11:59 AM     Your access code will  in 90 days. If you need help or a new code, please call your [facility] clinic or [phone].        Care EveryWhere ID     This is your Care EveryWhere ID. This could be used by other organizations to access your [facility] medical records  [id number]        Your Vitals Were     Pulse Temperature Respirations Height Pulse Oximetry BMI (Body Mass Index)    111 98.1  F (36.7  C) (Oral) 19 5' 2.5\" (1.588 m) 98% 36.32 kg/m2       Blood Pressure from Last 3 Encounters:   18 114/82   18 102/66   17 128/70    Weight from Last 3 Encounters:   18 201 lb 12.8 oz (91.5 kg)   18 202 lb (91.6 kg)   17 208 lb (94.3 kg)              We Performed the Following     ADMIN MEDICARE: Pneumococcal Vaccine ()     GASTROENTEROLOGY ADULT REF PROCEDURE ONLY [facility] [phone]; No Provider Preference     OPHTHALMOLOGY ADULT REFERRAL     Pneumococcal vaccine 23 valent PPSV23  (Pneumovax) [43594]        Primary Care Provider Office Phone # Fax #    Chris Bhatia [phone]       [address] [place] [id number]        Equal Access to Services     DWAINE TORRES : Levar Reddy, narciso rueda, qapari gillespie. So [facility] [phone].    ATENCIÓN: Si habla español, tiene a lacy disposición servicios gratuitos de asistencia lingüística. Llame al [phone].    We comply with applicable federal civil rights laws and [place] laws. We do not discriminate on the " basis of race, color, national origin, age, disability, sex, sexual orientation, or gender identity.            Thank you!     Thank you for choosing [facility]  for your care. Our goal is always to provide you with excellent care. Hearing back from our patients is one way we can continue to improve our services. Please take a few minutes to complete the written survey that you may receive in the mail after your visit with us. Thank you!             Your Updated Medication List - Protect others around you: Learn how to safely use, store and throw away your medicines at www.disposemymeds.org.          This list is accurate as of 2/20/18  9:57 AM.  Always use your most recent med list.                   Brand Name Dispense Instructions for use Diagnosis    albuterol 108 (90 BASE) MCG/ACT Inhaler    PROAIR HFA/PROVENTIL HFA/VENTOLIN HFA    1 Inhaler    Inhale 2 puffs into the lungs every 4 hours as needed for shortness of breath / dyspnea or wheezing    Post-infection bronchospasm       aspirin 81 MG tablet      Take 1 tablet by mouth daily.    Hyperlipidemia LDL goal < 130       atorvastatin 20 MG tablet    LIPITOR    90 tablet    Take 1 tablet (20 mg) by mouth daily    Hyperlipidemia with target LDL less than 130       lisinopril-hydrochlorothiazide 20-12.5 MG per tablet    PRINZIDE/ZESTORETIC    180 tablet    Take 2 tablets by mouth every morning    Essential hypertension, benign       montelukast 10 MG tablet    SINGULAIR    30 tablet    Take 1 tablet (10 mg) by mouth At Bedtime    Cough

## 2018-02-20 NOTE — PATIENT INSTRUCTIONS
"  *  Your red blood cell count is lower than desired.  We need to investigate as to why this is happening.     *  Colonoscopy and upper endoscopy at [facility] to evaluate the stomach, esophagus, colon for possible sources of \"silent blood loss\"     --[facility] GI procedure  [phone]      *  Return to see me after the colonoscopy and upper endoscopy procedures are performed.      *  Mammogram at your convenience    *  Bone density scan ( DEXA) to check bone density.     *  Your labs indicated a low Vitamin D level.  While the complete clinical significance of Vitamin D levels still remains to be determined, there is enough data to recommend supplementation whenever lower values are seen as it has been shown to help bone health, immune system function, and treat seasonal affective disorder.     *  I would recommend taking Vitamin D 4000 units per day (2 x 2000 unit capsules), which you can get at any pharmacy and most grocery stores (the cheapest prices are at [employer] and [employer]).  We can repeat the levels the next time we do labs, there is no need to do it any earlier.     *  [facility] - [place] [phone] [email or web address] to evaluate the cataract.        Preventive Health Recommendations    Female Ages 65 +    Yearly exam:     See your health care provider every year in order to  o Review health changes.   o Discuss preventive care.    o Review your medicines if your doctor has prescribed any.      You no longer need a yearly Pap test unless you've had an abnormal Pap test in the past 10 years. If you have vaginal symptoms, such as bleeding or discharge, be sure to talk with your provider about a Pap test.      Every 1 to 2 years, have a mammogram.  If you are over 69, talk with your health care provider about whether or not you want to continue having screening mammograms.      Every 10 years, have a colonoscopy. Or, have " "a yearly FIT test (stool test). These exams will check for colon cancer.       Have a cholesterol test every 5 years, or more often if your doctor advises it.       Have a diabetes test (fasting glucose) every three years. If you are at risk for diabetes, you should have this test more often.       At age 65, have a bone density scan (DEXA) to check for osteoporosis (brittle bone disease).    Shots:    Get a flu shot each year.    Get a tetanus shot every 10 years.    Talk to your doctor about your pneumonia vaccines. There are now two you should receive - Pneumovax (PPSV 23) and Prevnar (PCV 13).    Talk to your doctor about the shingles vaccine.    Talk to your doctor about the hepatitis B vaccine.    Nutrition:     Eat at least 5 servings of fruits and vegetables each day.      Eat whole-grain bread, whole-wheat pasta and brown rice instead of white grains and rice.      Talk to your provider about Calcium and Vitamin D.        --Good Grains:  Oats, brown rice, Quinoa (these do not raise the blood sugar as much)     --Bad grains:  Anything made from wheat or white rice     (because these raise the blood sugars significantly, and the possible gluten issue from wheat for some people).      --Proteins:  Aim for \"lean proteins\" including chicken, fish, seafood, pork, turkey, and eggs (in moderation); Eat red meat only occasionally        Lifestyle    Exercise at least 150 minutes a week (30 minutes a day, 5 days a week). This will help you control your weight and prevent disease.      Limit alcohol to one drink per day.      No smoking.       Wear sunscreen to prevent skin cancer.       See your dentist twice a year for an exam and cleaning.      See your eye doctor every 1 to 2 years to screen for conditions such as glaucoma, macular degeneration and cataracts.  "

## 2018-02-20 NOTE — PROGRESS NOTES
"SUBJECTIVE:   Marilia Palma is a 72 year old female who presents for Preventive Visit.  Are you in the first 12 months of your Medicare coverage?  No    Physical   Annual:     Getting at least 3 servings of Calcium per day::  Yes    Bi-annual eye exam::  Yes    Dental care twice a year::  NO    Sleep apnea or symptoms of sleep apnea::  None    Diet::  Regular (no restrictions)    Frequency of exercise::  2-3 days/week    Duration of exercise::  30-45 minutes    Taking medications regularly::  Yes    Medication side effects::  None    Additional concerns today::  No    Ability to successfully perform activities of daily living: no assistance needed  Home Safety:  Lack of grab bars in the bathroom, lack of handrails on stairs and lighting is poor  Hearing Impairment: no hearing concerns  Fall risk:  Fallen 2 or more times in the past year?: No  Any fall with injury in the past year?: No    COGNITIVE SCREEN  1) Repeat 3 items (Banana, Sunrise, Chair)    2) Clock draw: NORMAL  3) 3 item recall: Recalls 3 objects  Results: 3 items recalled: COGNITIVE IMPAIRMENT LESS LIKELY    Mini-CogTM Copyright S Samantha. Licensed by the author for use in [facility]; reprinted with permission (soob@[facility]). All rights reserved.      1.  recent post infectious bronchospasm, better with use of albuteorl.     2.  The blood count has been lower than expected and desired.   No evidence for obvious blood loss.  No recent surgeries, no nosebleeds, no obvious intestinal blood loss, no hematochezia.   Does not take NSAIDs regularly, does not drink alcohol regularly, does not donate blood products regularly.   Reports nutrition as \"good\"  Has not had issues or workup for previous anemia before.   Has not had previous colonoscopy.     Reviewed labs in [facility]:    Lab Results   Component Value Date    HGB 9.6 02/15/2018    HGB 10.8 01/30/2018    HGB 12.4 02/15/2017    HGB 12.9 07/07/2015    HGB 13.4 11/28/2012        3.    Blood " sharon remains well controlled at home  Readings outside clinic are within normal limits.  Reviewed last 6 BP readings in chart:  BP Readings from Last 6 Encounters:   02/20/18 114/82   01/30/18 102/66   12/29/17 128/70   02/10/17 130/76   10/06/15 122/78   07/08/15 (!) 142/96     He has not experienced any significant side effects from medicaiotns for hypertension.    NO active cardiac complaints or symptoms with exercise.     4.  Has history of hyperlipidemia.  On statin for this, denies any significant side effects of this medication.      Latest labs reviewed:    Recent Labs   Lab Test  01/30/18   1122  02/15/17   0806  10/02/15   0758  07/07/15   0803   CHOL  121  167  273*  215*   HDL  56  51  50*  48*   LDL  47  84  183*  131*   TRIG  91  160*  200*  178*   CHOLHDLRATIO   --    --   5.5*  4.5        Lab Results   Component Value Date    AST 26 01/30/2018           Reviewed and updated as needed this visit by clinical staff  Allergies  Meds         Reviewed and updated as needed this visit by Provider        Social History   Substance Use Topics     Smoking status: Never Smoker     Smokeless tobacco: Never Used     Alcohol use No     Alcohol Use 2/20/2018   If you drink alcohol, do you typically have greater than 3 drinks per day OR greater than 7 drinks per week?   No     Today's PHQ-2 Score:   PHQ-2 ( 1999 Pfizer) 2/20/2018   Q1: Little interest or pleasure in doing things 0   Q2: Feeling down, depressed or hopeless 0   PHQ-2 Score 0   Q1: Little interest or pleasure in doing things Not at all   Q2: Feeling down, depressed or hopeless Not at all   PHQ-2 Score 0       Do you feel safe in your environment - Yes    Do you have a Health Care Directive?: Yes: Patient states has Advance Directive and will bring in a copy to clinic.    Current providers sharing in care for this patient include:   Patient Care Team:  Chris Bhatia MD as PCP - General (Internal Medicine)    The following health  maintenance items are reviewed in Epic and correct as of today:  Health Maintenance   Topic Date Due     MEDICARE ANNUAL WELLNESS VISIT  05/31/1962     COLON CANCER SCREEN (SYSTEM ASSIGNED)  05/31/1994     DEXA SCAN SCREENING (SYSTEM ASSIGNED)  05/31/2009     MAMMO SCREEN Q2 YR (SYSTEM ASSIGNED)  07/07/2017     ADVANCE DIRECTIVE PLANNING Q5 YRS  12/03/2017     PNEUMOCOCCAL (2 of 2 - PPSV23) 02/10/2018     FALL RISK ASSESSMENT  02/10/2018     TETANUS IMMUNIZATION (SYSTEM ASSIGNED)  12/03/2022     LIPID SCREEN Q5 YR FEMALE (SYSTEM ASSIGNED)  01/30/2023     INFLUENZA VACCINE (SYSTEM ASSIGNED)  Completed         Past Medical History:  ---------------------------  Past Medical History:   Diagnosis Date     Deaf 1945     HTN (hypertension) 2012     Hyperlipidemia LDL goal < 130 11/28/12     pre-treatment     Obesity (BMI 30-39.9) 12/3/12    BMI 37       Past Surgical History:  ---------------------------  No past surgical history on file.    Current Medications:  ---------------------------  Current Outpatient Prescriptions   Medication Sig Dispense Refill     albuterol (PROAIR HFA/PROVENTIL HFA/VENTOLIN HFA) 108 (90 BASE) MCG/ACT Inhaler Inhale 2 puffs into the lungs every 4 hours as needed for shortness of breath / dyspnea or wheezing 1 Inhaler 2     montelukast (SINGULAIR) 10 MG tablet Take 1 tablet (10 mg) by mouth At Bedtime 30 tablet 1     lisinopril-hydrochlorothiazide (PRINZIDE/ZESTORETIC) 20-12.5 MG per tablet Take 2 tablets by mouth every morning 180 tablet 3     atorvastatin (LIPITOR) 20 MG tablet Take 1 tablet (20 mg) by mouth daily 90 tablet 3     aspirin 81 MG tablet Take 1 tablet by mouth daily.         Allergies:  -------------  No Known Allergies    Social History:  -------------------  Social History     Social History     Marital status:      Spouse name: N/A     Number of children: N/A     Years of education: N/A     Occupational History     Not on file.     Social History Main Topics      "Smoking status: Never Smoker     Smokeless tobacco: Never Used     Alcohol use No     Drug use: No     Sexual activity: No     Other Topics Concern     Not on file     Social History Narrative       Family Medical History:  ------------------------------  Family History   Problem Relation Age of Onset     Family History Negative Mother       natural causes age 90     CEREBROVASCULAR DISEASE Father 65      after atroke     Hypertension Father           Review of Systems  Constitutional, HEENT, cardiovascular, pulmonary, gi and gu systems are negative, except as otherwise noted.    OBJECTIVE:   /82  Pulse 111  Temp 98.1  F (36.7  C) (Oral)  Resp 19  Ht 5' 2.5\" (1.588 m)  Wt 201 lb 12.8 oz (91.5 kg)  SpO2 98%  BMI 36.32 kg/m2 Estimated body mass index is 36.32 kg/(m^2) as calculated from the following:    Height as of this encounter: 5' 2.5\" (1.588 m).    Weight as of this encounter: 201 lb 12.8 oz (91.5 kg).  Physical Exam  GENERAL alert and no distress  EYES:  Normal sclera,conjunctiva, EOMI  HENT: oral and posterior pharynx without lesions or erythema, facies symmetric  NECK: Neck supple. No LAD, without thyroidmegaly or JVD., Carotids without bruits.  RESP: Clear to ausculation bilaterally without wheezes or crackles. Normal BS in all fields.  CV: RRR normal S1S2 without murmurs, rubs or gallops. PMI normal  LYMPH: no cervical lymph adenopathy appreciated  MS: extremities- no gross deformities of the visible extremities noted, no edema  PSYCH: Alert and oriented times 3; speech- coherent  SKIN:  No obvious significant skin lesions on visible portions of face  EXT:  No lower extremity edema     ASSESSMENT / PLAN:     (Z00.00) Medicare annual wellness visit, subsequent  (primary encounter diagnosis)  Comment: Discussed cardiac disease risk factors and cardiac disease risk factor modification, including diabetes screening, blood pressure screening (and management if indicated), and cholesterol " screening.   Reviewed immunzation guidelines, including pneumococcal vaccines, annual influenza, and shingles vaccines.   Discussed routine cancer screenings, including skin cancer, colon cancer screening for everyone until age 80, prostate cancer screening in men until age 75, mammogram and PAP/pelvic for women until age 75.   Recommended regular dentist visits to care for remaining teeth.   Recommended regular screening for vision and glaucoma.   Recommended safe driving and accident avoidance.   Plan:     (D50.8) Other iron deficiency anemia  Comment: need to find otu why she is anemic.   She give sno reasons.   Will start with EGD and COlonoscopy  If nothing found, then needs to see Hematlogy  Plan: GASTROENTEROLOGY ADULT REF PROCEDURE ONLY         [facility]  [phone]; No Provider        Preference            (Z12.11) Screening for colon cancer  Comment:   Plan: GASTROENTEROLOGY ADULT REF PROCEDURE ONLY         [facility]  [phone]; No Provider        Preference            (Z12.31) Encounter for screening mammogram for malignant neoplasm of breast  Comment:   Plan:     (Z12.11) Screen for colon cancer  Comment:   Plan:     (Z78.0) Asymptomatic postmenopausal status  Comment:   Plan:     (Z12.31) Visit for screening mammogram  Comment:   Plan: MA SCREENING DIGITAL BILAT - Future  (s+30)            (Z23) Need for prophylactic vaccination against Streptococcus pneumoniae (pneumococcus)  Comment:   Plan: Pneumococcal vaccine 23 valent PPSV23          (Pneumovax) [03082], ADMIN MEDICARE:         Pneumococcal Vaccine ()            (I10) Essential hypertension  Comment: This condition is currently controlled on the current medical regimen.  Continue current therapy.   Discussed hypertension in detail including JNC and American Heart Association guidelines for blood pressure goals.  Discussed indication for treatment and treatment options.  Discussed the importance for aggressive  management of HTN to prevent vascular complications later.  Recommended lower fat, lower carbohydrate, and lower sodium (<2000 mg)diet.  Discussed required intervals for follow up on HTN, lab studies, and the need to aggresive management of other cardiac disease risk factors.  Recommened pt. follow their blood pressures outside the clinic to ensure that BPs are remaining within guidelines, and to contact me if the readings are not within guidelines so we can adjust treatment as needed.   Plan:     (E66.9) Obesity (BMI 30-39.9)  Comment: The patient's current BMI is: Body mass index is 36.32 kg/(m^2).  Obesity is a biological preventable and treatable disease, which is associated with significantly increased risk of many acute and chronic health conditions. Obesity has now been recognized as a chronic disease by the American Medical Association.  A range of serious co-morbidities are associated with obesity including increased risk for hypertension, stroke, coronary artery disease, dyslipidemia, Type II diabetes, depression, sleep apnea, cancers of the colon, breast and endometrium, obstructive sleep apnea, osteoarthritis and female infertility. Therefore, obesity is not just a problem; it s a disease that warrants serious evidence based treatements.  Recommended regular aerobic exercise, recommended improved diet aiming at lowering amount of processed foods, lower sugars, and lower wheat products, and moderation carbs and fat in the diet, establishing more regular meal times, always eating breakfast, front loading some of the calories and adding more protein to the diet.    Discussed the increased risks of developing or worsening all types of diabetes and other dysmetabolic syndromes.    Surgical therapy may be considered, especially in patients with BMI greater than or equal to 35 kg/m2 with multiple complications. Surgical treatments include lap-band, gastric sleeve or gastric bypass surgery.     Plan:     (E78.5)  "Hyperlipidemia with target LDL less than 130  Comment: Discussed current lipid results, previous results (if available) current guidelines (NCEP) for treatment and goals for lipids.  Discussed lifestyle modification, dietary changes (low fat, low simple carb) and regular aerobic exercise.  Discussed the link between dysmetabolic syndrome and impaired glucose tolerance seen in certain patterns of lipids.  Briefly discussed medication used for lipid lowering, including the statins are their possible side effects of myalgias, rhabdomyolysis, and liver toxicity.   Plan:     (Z13.820) Screening for osteoporosis  Comment:   Plan: Dexa Hip /Pelvis /Spine ([id number])            (H26.9) Cataract of right eye, unspecified cataract type  Comment:   Plan: OPHTHALMOLOGY ADULT REFERRAL               End of Life Planning:  Patient currently has an advanced directive: No.  I have verified the patient's ablity to prepare an advanced directive/make health care decisions.  Literature was provided to assist patient in preparing an advanced directive.    COUNSELING:  Reviewed preventive health counseling, as reflected in patient instructions       Regular exercise       Healthy diet/nutrition       Vision screening       Dental care       Aspirin Prophylaxsis       Colon cancer screening       Hepatitis C screening        Estimated body mass index is 36.32 kg/(m^2) as calculated from the following:    Height as of this encounter: 5' 2.5\" (1.588 m).    Weight as of this encounter: 201 lb 12.8 oz (91.5 kg).     reports that she has never smoked. She has never used smokeless tobacco.      Appropriate preventive services were discussed with this patient, including applicable screening as appropriate for cardiovascular disease, diabetes, osteopenia/osteoporosis, and glaucoma.  As appropriate for age/gender, discussed screening for colorectal cancer, prostate cancer, breast cancer, and cervical cancer. Checklist reviewing preventive services " "available has been given to the patient.    Reviewed patients plan of care and provided an AVS. The Basic Care Plan (routine screening as documented in Health Maintenance) for Marilia meets the Care Plan requirement. This Care Plan has been established and reviewed with the Patient.    Counseling Resources:  ATP IV Guidelines  Pooled Cohorts Equation Calculator  Breast Cancer Risk Calculator  FRAX Risk Assessment  ICSI Preventive Guidelines  Dietary Guidelines for Americans, 2010  USDA's MyPlate  ASA Prophylaxis  Lung CA Screening    Chris Bhatia MD  [facility]  Answers for HPI/ROS submitted by the patient on 2/20/2018   PHQ-2 Score: 0      Discussed hat additional charges may be applied for addressing concerns at today's visit above and beyond what is covered by the \"routine physical exam\" diagnosis code.  Patient verbalized understanding and would like additional concerns addressed today.    Spent greater than 15 minutes above and beyond routine Health Care Maintenence issues on the above mentioned additional problem(s).   "

## 2018-03-01 DIAGNOSIS — E78.5 HYPERLIPIDEMIA WITH TARGET LDL LESS THAN 130: ICD-10-CM

## 2018-03-01 DIAGNOSIS — I10 ESSENTIAL HYPERTENSION, BENIGN: ICD-10-CM

## 2018-03-01 RX ORDER — ATORVASTATIN CALCIUM 20 MG/1
TABLET, FILM COATED ORAL
Qty: 90 TABLET | Refills: 3 | Status: SHIPPED | OUTPATIENT
Start: 2018-03-01 | End: 2019-04-16

## 2018-03-01 RX ORDER — LISINOPRIL AND HYDROCHLOROTHIAZIDE 12.5; 2 MG/1; MG/1
TABLET ORAL
Qty: 180 TABLET | Refills: 3 | Status: SHIPPED | OUTPATIENT
Start: 2018-03-01 | End: 2019-05-07

## 2018-03-22 ENCOUNTER — TRANSFERRED RECORDS (OUTPATIENT)
Dept: HEALTH INFORMATION MANAGEMENT | Facility: CLINIC | Age: 73
End: 2018-03-22

## 2018-03-27 ENCOUNTER — SURGERY (OUTPATIENT)
Age: 73
End: 2018-03-27

## 2018-03-27 ENCOUNTER — HOSPITAL ENCOUNTER (OUTPATIENT)
Facility: CLINIC | Age: 73
Discharge: HOME | End: 2018-03-27
Attending: SPECIALIST | Admitting: SPECIALIST
Payer: COMMERCIAL

## 2018-03-27 ENCOUNTER — OFFICE VISIT (OUTPATIENT)
Dept: INTERPRETER SERVICES | Facility: CLINIC | Age: 73
End: 2018-03-27
Payer: COMMERCIAL

## 2018-03-27 VITALS
RESPIRATION RATE: 20 BRPM | DIASTOLIC BLOOD PRESSURE: 57 MMHG | OXYGEN SATURATION: 97 % | WEIGHT: 199 LBS | HEIGHT: 63 IN | SYSTOLIC BLOOD PRESSURE: 111 MMHG | BODY MASS INDEX: 35.26 KG/M2

## 2018-03-27 LAB
COLONOSCOPY: NORMAL
UPPER GI ENDOSCOPY: NORMAL

## 2018-03-27 PROCEDURE — G0500 MOD SEDAT ENDO SERVICE >5YRS: HCPCS | Performed by: SPECIALIST

## 2018-03-27 PROCEDURE — 43239 EGD BIOPSY SINGLE/MULTIPLE: CPT | Performed by: SPECIALIST

## 2018-03-27 PROCEDURE — 88305 TISSUE EXAM BY PATHOLOGIST: CPT | Performed by: SPECIALIST

## 2018-03-27 PROCEDURE — 88305 TISSUE EXAM BY PATHOLOGIST: CPT | Mod: 26 | Performed by: SPECIALIST

## 2018-03-27 PROCEDURE — T1013 SIGN LANG/ORAL INTERPRETER: HCPCS | Mod: U3

## 2018-03-27 PROCEDURE — 25000125 ZZHC RX 250: Performed by: SPECIALIST

## 2018-03-27 PROCEDURE — 45385 COLONOSCOPY W/LESION REMOVAL: CPT | Performed by: SPECIALIST

## 2018-03-27 PROCEDURE — 25000128 H RX IP 250 OP 636: Performed by: SPECIALIST

## 2018-03-27 PROCEDURE — 99153 MOD SED SAME PHYS/QHP EA: CPT | Performed by: SPECIALIST

## 2018-03-27 PROCEDURE — 45381 COLONOSCOPY SUBMUCOUS NJX: CPT | Performed by: SPECIALIST

## 2018-03-27 RX ORDER — LIDOCAINE 40 MG/G
CREAM TOPICAL
Status: DISCONTINUED | OUTPATIENT
Start: 2018-03-27 | End: 2018-03-27 | Stop reason: HOSPADM

## 2018-03-27 RX ORDER — ONDANSETRON 2 MG/ML
4 INJECTION INTRAMUSCULAR; INTRAVENOUS
Status: DISCONTINUED | OUTPATIENT
Start: 2018-03-27 | End: 2018-03-27 | Stop reason: HOSPADM

## 2018-03-27 RX ORDER — FENTANYL CITRATE 50 UG/ML
INJECTION, SOLUTION INTRAMUSCULAR; INTRAVENOUS PRN
Status: DISCONTINUED | OUTPATIENT
Start: 2018-03-27 | End: 2018-03-27 | Stop reason: HOSPADM

## 2018-03-27 RX ADMIN — MIDAZOLAM 0.5 MG: 1 INJECTION INTRAMUSCULAR; INTRAVENOUS at 08:36

## 2018-03-27 RX ADMIN — FENTANYL CITRATE 25 MCG: 50 INJECTION, SOLUTION INTRAMUSCULAR; INTRAVENOUS at 07:45

## 2018-03-27 RX ADMIN — MIDAZOLAM 0.5 MG: 1 INJECTION INTRAMUSCULAR; INTRAVENOUS at 07:59

## 2018-03-27 RX ADMIN — FENTANYL CITRATE 25 MCG: 50 INJECTION, SOLUTION INTRAMUSCULAR; INTRAVENOUS at 07:59

## 2018-03-27 RX ADMIN — MIDAZOLAM 0.5 MG: 1 INJECTION INTRAMUSCULAR; INTRAVENOUS at 07:45

## 2018-03-27 RX ADMIN — MIDAZOLAM 0.5 MG: 1 INJECTION INTRAMUSCULAR; INTRAVENOUS at 08:38

## 2018-03-27 RX ADMIN — MIDAZOLAM 0.5 MG: 1 INJECTION INTRAMUSCULAR; INTRAVENOUS at 08:32

## 2018-03-27 RX ADMIN — TOPICAL ANESTHETIC 1 APPLICATOR: 200 SPRAY DENTAL; PERIODONTAL at 08:34

## 2018-03-27 RX ADMIN — FENTANYL CITRATE 25 MCG: 50 INJECTION, SOLUTION INTRAMUSCULAR; INTRAVENOUS at 08:32

## 2018-03-27 RX ADMIN — FENTANYL CITRATE 50 MCG: 50 INJECTION, SOLUTION INTRAMUSCULAR; INTRAVENOUS at 07:39

## 2018-03-27 RX ADMIN — MIDAZOLAM 0.5 MG: 1 INJECTION INTRAMUSCULAR; INTRAVENOUS at 07:50

## 2018-03-27 RX ADMIN — FENTANYL CITRATE 25 MCG: 50 INJECTION, SOLUTION INTRAMUSCULAR; INTRAVENOUS at 07:50

## 2018-03-27 RX ADMIN — MIDAZOLAM 1 MG: 1 INJECTION INTRAMUSCULAR; INTRAVENOUS at 07:39

## 2018-03-27 NOTE — BRIEF OP NOTE
[facility] Brief Operative Note    Pre-operative diagnosis: iron deficiency anemia /screening   Post-operative diagnosis rectosigmoid polyp, nodular edema of prepyloric antrum     Procedure: Procedure(s):  colonoscopy/gastroscopy - Wound Class: II-Clean Contaminated   - Wound Class: II-Clean Contaminated   - Wound Class: II-Clean Contaminated   Surgeon(s): Surgeon(s) and Role:     * George Miles MD - Primary   Estimated blood loss: * No values recorded between 3/27/2018 12:00 AM and 3/27/2018  8:55 AM *    Specimens:   ID Type Source Tests Collected by Time Destination   A : rectosigmoid   Polyp Large Intestine, Sigmoid SURGICAL PATHOLOGY EXAM George Miles MD 3/27/2018  8:06 AM    B : iron deficiency anemia, r/o celiac sprue.  Biopsy Small Intestine, Duodenum SURGICAL PATHOLOGY EXAM George Miles MD 3/27/2018  8:48 AM    C : iron deficiency anemia  Biopsy Stomach, Antrum SURGICAL PATHOLOGY EXAM George Miles MD 3/27/2018  8:49 AM    D : iron deficiency anemia  Tissue Stomach, Body SURGICAL PATHOLOGY EXAM George Miles MD 3/27/2018  8:49 AM       Findings: Please see ProVation procedure note in Chart Review

## 2018-03-27 NOTE — H&P
Pre-Endoscopy History and Physical     Marilia Palma MRN# [id number]   YOB: 1945 Age: 72 year old     Date of Procedure: 3/27/2018  Primary care provider: Chris Bhatia  Type of Endoscopy: Colonoscopy with possible biopsy, possible polypectomy, endoscoopy  Reason for Procedure: screening, reflux  Type of Anesthesia Anticipated: Conscious Sedation    HPI:    Marilia is a 72 year old female who will be undergoing the above procedure.      A history and physical has been performed. The patient's medications and allergies have been reviewed. The risks and benefits of the procedure and the sedation options and risks were discussed with the patient.  All questions were answered and informed consent was obtained.      She denies a personal or family history of anesthesia complications or bleeding disorders.     Patient Active Problem List   Diagnosis     Deaf     CARDIOVASCULAR SCREENING; LDL GOAL LESS THAN 130     ACP (advance care planning)     Hyperlipidemia with target LDL less than 130     Obesity (BMI 30-39.9)     Essential hypertension     Elevated fasting glucose     Other iron deficiency anemia        Past Medical History:   Diagnosis Date     1945     HTN (hypertension)      Hyperlipidemia LDL goal < 130 12     pre-treatment     Obesity (BMI 30-39.9) 12/3/12    BMI 37        History reviewed. No pertinent surgical history.    Social History   Substance Use Topics     Smoking status: Never Smoker     Smokeless tobacco: Never Used     Alcohol use No       Family History   Problem Relation Age of Onset     Family History Negative Mother       natural causes age 90     CEREBROVASCULAR DISEASE Father 65      after atroke     Hypertension Father        Prior to Admission medications    Medication Sig Start Date End Date Taking? Authorizing Provider   lisinopril-hydrochlorothiazide (PRINZIDE/ZESTORETIC) 20-12.5 MG per tablet TAKE TWO TABLETS BY MOUTH ONCE DAILY  "IN THE MORNING 3/1/18  Yes Chris Bhatia MD   atorvastatin (LIPITOR) 20 MG tablet TAKE ONE TABLET BY MOUTH ONCE DAILY 3/1/18  Yes Chris Bhatia MD   albuterol (PROAIR HFA/PROVENTIL HFA/VENTOLIN HFA) 108 (90 BASE) MCG/ACT Inhaler Inhale 2 puffs into the lungs every 4 hours as needed for shortness of breath / dyspnea or wheezing 1/30/18  Yes Chris Bhatia MD   montelukast (SINGULAIR) 10 MG tablet Take 1 tablet (10 mg) by mouth At Bedtime 1/30/18   Chris Bhatia MD   aspirin 81 MG tablet Take 1 tablet by mouth daily. 12/3/12   Chris Bhatia MD       No Known Allergies     REVIEW OF SYSTEMS:   5 point ROS negative except as noted above in HPI, including Gen., Resp., CV, GI &  system review.    PHYSICAL EXAM:   Ht 1.588 m (5' 2.5\")  Wt 90.3 kg (199 lb)  BMI 35.82 kg/m2 Estimated body mass index is 35.82 kg/(m^2) as calculated from the following:    Height as of this encounter: 1.588 m (5' 2.5\").    Weight as of this encounter: 90.3 kg (199 lb).   GENERAL APPEARANCE: alert, and oriented  MENTAL STATUS: alert  AIRWAY EXAM: Mallampatti Class II (visualization of the soft palate, fauces, and uvula)  RESP: lungs clear to auscultation - no rales, rhonchi or wheezes  CV: regular rates and rhythm  DIAGNOSTICS:    Not indicated    IMPRESSION   ASA Class 2 - Mild systemic disease    PLAN:   Plan for Colonoscopy with possible biopsy, possible polypectomy. We discussed the risks, benefits and alternatives and the patient wished to proceed.    The above has been forwarded to the consulting provider.      Signed Electronically by: George Miles  March 27, 2018          "

## 2018-03-30 LAB — COPATH REPORT: NORMAL

## 2018-10-16 ENCOUNTER — ALLIED HEALTH/NURSE VISIT (OUTPATIENT)
Dept: NURSING | Facility: CLINIC | Age: 73
End: 2018-10-16
Payer: COMMERCIAL

## 2018-10-16 DIAGNOSIS — Z23 NEED FOR PROPHYLACTIC VACCINATION AND INOCULATION AGAINST INFLUENZA: Primary | ICD-10-CM

## 2018-10-16 PROCEDURE — G0008 ADMIN INFLUENZA VIRUS VAC: HCPCS

## 2018-10-16 PROCEDURE — 90662 IIV NO PRSV INCREASED AG IM: CPT

## 2018-10-16 NOTE — PROGRESS NOTES

## 2018-10-16 NOTE — MR AVS SNAPSHOT
"              After Visit Summary   10/16/2018    Marilia Palma    MRN: [id number]           Patient Information     Date Of Birth          1945        Visit Information        Provider Department      10/16/2018 3:30 PM VAHID ROMERO; [facility] - NURSE [facility]        Today's Diagnoses     Need for prophylactic vaccination and inoculation against influenza    -  1       Follow-ups after your visit        Your next 10 appointments already scheduled     Oct 16, 2018  3:30 PM CDT   Nurse Only with [facility] - NURSE   [facility] ([facility])    [address] [id number] [phone]              Who to contact     If you have questions or need follow up information about today's clinic visit or your schedule please contact [facility] directly at [phone].  Normal or non-critical lab and imaging results will be communicated to you by MyChart, letter or phone within 4 business days after the clinic has received the results. If you do not hear from us within 7 days, please contact the clinic through MyChart or phone. If you have a critical or abnormal lab result, we will notify you by phone as soon as possible.  Submit refill requests through [facility] or call your pharmacy and they will forward the refill request to us. Please allow 3 business days for your refill to be completed.          Additional Information About Your Visit        MyChart Information     [facility] lets you send messages to your doctor, view your test results, renew your prescriptions, schedule appointments and more. To sign up, go to [email or web address] . Click on \"Log in\" on the left side of the screen, which will take you to the Welcome page. Then click on \"Sign up Now\" on the right side of the page.     You will be asked to enter the access code listed below, as well as some personal information. Please follow " the directions to create your username and password.     Your access code is: [email or web address]  Expires: 2019 12:50 PM     Your access code will  in 90 days. If you need help or a new code, please call your [place] clinic or [phone].        Care EveryWhere ID     This is your Care EveryWhere ID. This could be used by other organizations to access your [place] medical records  [id number]         Blood Pressure from Last 3 Encounters:   18 111/57   18 114/82   18 102/66    Weight from Last 3 Encounters:   18 199 lb (90.3 kg)   18 201 lb 12.8 oz (91.5 kg)   18 202 lb (91.6 kg)              We Performed the Following     FLU VACCINE, INCREASED ANTIGEN, PRESV FREE, AGE 65+ [38440]     Vaccine Administration, Initial [06495]        Primary Care Provider Office Phone # Fax #    Chris Bhatia [phone]       [address] [place] [address]        Equal Access to Services     ARIEL TORRES : Hadii aad ku hadasho Soomaali, waaxda luqadaha, qaybta kaalmada adeegyada, pari keller haybryanna see . So [facility] [phone].    ATENCIÓN: Si habla español, tiene a lacy disposición servicios gratuitos de asistencia lingüística. Llame al [phone].    We comply with applicable federal civil rights laws and [place] laws. We do not discriminate on the basis of race, color, national origin, age, disability, sex, sexual orientation, or gender identity.            Thank you!     Thank you for choosing [facility]  for your care. Our goal is always to provide you with excellent care. Hearing back from our patients is one way we can continue to improve our services. Please take a few minutes to complete the written survey that you may receive in the mail after your visit with us. Thank you!             Your Updated Medication List - Protect others around you: Learn how to safely use, store and throw away your medicines at  www.disposemymeds.org.          This list is accurate as of 10/16/18 12:50 PM.  Always use your most recent med list.                   Brand Name Dispense Instructions for use Diagnosis    albuterol 108 (90 Base) MCG/ACT inhaler    PROAIR HFA/PROVENTIL HFA/VENTOLIN HFA    1 Inhaler    Inhale 2 puffs into the lungs every 4 hours as needed for shortness of breath / dyspnea or wheezing    Post-infection bronchospasm       aspirin 81 MG tablet      Take 1 tablet by mouth daily.    Hyperlipidemia LDL goal < 130       atorvastatin 20 MG tablet    LIPITOR    90 tablet    TAKE ONE TABLET BY MOUTH ONCE DAILY    Hyperlipidemia with target LDL less than 130       lisinopril-hydrochlorothiazide 20-12.5 MG per tablet    PRINZIDE/ZESTORETIC    180 tablet    TAKE TWO TABLETS BY MOUTH ONCE DAILY IN THE MORNING    Essential hypertension, benign       montelukast 10 MG tablet    SINGULAIR    30 tablet    Take 1 tablet (10 mg) by mouth At Bedtime    Cough

## 2019-02-06 ENCOUNTER — TELEPHONE (OUTPATIENT)
Dept: INTERNAL MEDICINE | Facility: CLINIC | Age: 74
End: 2019-02-06

## 2019-02-06 NOTE — TELEPHONE ENCOUNTER
Panel Management Review          Composite cancer screening  Chart review shows that this patient is due/due soon for the following Mammogram  Summary:    Patient is due/failing the following:   MAMMOGRAM    Action needed:   Schedule mammogram     Type of outreach:    Sent letter.    Questions for provider review:    None                                                                                                                                    Itzel Pierre       Chart routed to Care Team .

## 2019-02-06 NOTE — LETTER
[facility]  [address] [id number] [phone]  February 6, 2019    Marilia Palma  [address]  [facility] [address]    Dear Marilia,    We care about your health and based on a review of your medical records, recommend the the following, to better manage your health:      You are in particular need of attention regarding:  -Breast Cancer Screening    I am recommending that you:     -schedule a MAMMOGRAM which is due.    1 in 8 women will develop invasive breast cancer during her lifetime and it is the most common non-skin cancer in American women.  EARLY detection, new treatments, and a better understanding of the disease have increased survival rates - the 5 year survival rate in the 1960s was 63% and today it is close to 90%.    If you are under/uninsured, we recommend you contact the [facility]. They offer mammograms at no charge or on a sliding fee charge. You can schedule with them at [phone]. Please have them send us the results.      Please disregard this reminder if you have had this exam elsewhere within the last year.  It would be helpful for us to have a copy of your mammogram report in your file so that we can best coordinate your care - please contact us with when your test was done so we can update your record.               Here is a list of Health Maintenance topics that are due now or due soon:  Health Maintenance Due   Topic Date Due     Medicare Annual Wellness Visit  05/31/1962     Zoster (Shingles) Vaccine (1 of 2) 05/31/1994     Bone Density Screening (Dexa)  05/31/2009     Mammogram - every 2 years  07/07/2017     Discuss Advance Directive Planning  12/03/2017     FALL RISK ASSESSMENT  02/20/2019     Depression Assessment 2 - yearly  02/20/2019       Please call us at [phone] or [phone] (or use [employer]) to address the above recommendations.     Thank you for trusting [facility].   We appreciate the opportunity to serve you and look forward to supporting your healthcare needs in the future.    If you have (or plan to have) any of these tests done at a facility other than a [facility] or a [facility], please have the results from these tests sent to your primary physician at [facility].    Healthy Regards,    Chris Bhatia MD/Itzel Pierre

## 2019-04-16 DIAGNOSIS — E78.5 HYPERLIPIDEMIA WITH TARGET LDL LESS THAN 130: ICD-10-CM

## 2019-04-16 RX ORDER — ATORVASTATIN CALCIUM 20 MG/1
TABLET, FILM COATED ORAL
Qty: 30 TABLET | Refills: 0 | Status: SHIPPED | OUTPATIENT
Start: 2019-04-16 | End: 2019-05-07

## 2019-04-16 NOTE — LETTER
[facility]  [address] [id number] [phone]            Marilia BRANTLEY [facility]  [address]  [facility] [address]        April 16, 2019    Dear Marilia,    While refilling your prescription today, we noticed that you are due for an appointment with your provider.  We will refill your prescription for 30 days, but a follow-up appointment must be made before any additional refills can be approved.     Taking care of your health is important to us and we look forward to seeing you in the near future.  Please call us at [phone] or [phone] (or use [employer]) to schedule an appointment.     Please disregard this notice if you have already made an appointment.    Sincerely,        [facility]

## 2019-04-16 NOTE — TELEPHONE ENCOUNTER
"Requested Prescriptions   Pending Prescriptions Disp Refills     atorvastatin (LIPITOR) 20 MG tablet [Pharmacy Med Name: ATORVASTATIN 20MG   TAB] 90 tablet 3     Sig: TAKE 1 TABLET BY MOUTH ONCE DAILY       Statins Protocol Failed - 4/16/2019  9:27 AM        Failed - LDL on file in past 12 months     Recent Labs   Lab Test 01/30/18  1122   LDL 47             Failed - Recent (12 mo) or future (30 days) visit within the authorizing provider's specialty     Patient had office visit in the last 12 months or has a visit in the next 30 days with authorizing provider or within the authorizing provider's specialty.  See \"Patient Info\" tab in inbasket, or \"Choose Columns\" in Meds & Orders section of the refill encounter.              Passed - No abnormal creatine kinase in past 12 months     No lab results found.             Passed - Medication is active on med list        Passed - Patient is age 18 or older        Passed - No active pregnancy on record        Passed - No positive pregnancy test in past 12 months        "

## 2019-05-07 ENCOUNTER — OFFICE VISIT (OUTPATIENT)
Dept: INTERNAL MEDICINE | Facility: CLINIC | Age: 74
End: 2019-05-07
Payer: COMMERCIAL

## 2019-05-07 VITALS
BODY MASS INDEX: 37.92 KG/M2 | DIASTOLIC BLOOD PRESSURE: 80 MMHG | TEMPERATURE: 98.3 F | WEIGHT: 214 LBS | HEIGHT: 63 IN | SYSTOLIC BLOOD PRESSURE: 124 MMHG | OXYGEN SATURATION: 98 % | HEART RATE: 98 BPM

## 2019-05-07 DIAGNOSIS — I10 ESSENTIAL HYPERTENSION, BENIGN: ICD-10-CM

## 2019-05-07 DIAGNOSIS — A04.8 H. PYLORI INFECTION: ICD-10-CM

## 2019-05-07 DIAGNOSIS — R05.9 COUGH: ICD-10-CM

## 2019-05-07 DIAGNOSIS — E78.5 HYPERLIPIDEMIA WITH TARGET LDL LESS THAN 130: ICD-10-CM

## 2019-05-07 DIAGNOSIS — E66.01 MORBID OBESITY (H): ICD-10-CM

## 2019-05-07 DIAGNOSIS — D50.9 IRON DEFICIENCY ANEMIA, UNSPECIFIED IRON DEFICIENCY ANEMIA TYPE: ICD-10-CM

## 2019-05-07 DIAGNOSIS — Z00.00 MEDICARE ANNUAL WELLNESS VISIT, SUBSEQUENT: Primary | ICD-10-CM

## 2019-05-07 LAB
ALBUMIN SERPL-MCNC: 3.9 G/DL (ref 3.4–5)
ALP SERPL-CCNC: 104 U/L (ref 40–150)
ALT SERPL W P-5'-P-CCNC: 27 U/L (ref 0–50)
ANION GAP SERPL CALCULATED.3IONS-SCNC: 3 MMOL/L (ref 3–14)
AST SERPL W P-5'-P-CCNC: 19 U/L (ref 0–45)
BILIRUB SERPL-MCNC: 0.4 MG/DL (ref 0.2–1.3)
BUN SERPL-MCNC: 21 MG/DL (ref 7–30)
CALCIUM SERPL-MCNC: 9.7 MG/DL (ref 8.5–10.1)
CHLORIDE SERPL-SCNC: 102 MMOL/L (ref 94–109)
CHOLEST SERPL-MCNC: 157 MG/DL
CO2 SERPL-SCNC: 28 MMOL/L (ref 20–32)
CREAT SERPL-MCNC: 0.77 MG/DL (ref 0.52–1.04)
ERYTHROCYTE [DISTWIDTH] IN BLOOD BY AUTOMATED COUNT: 12.9 % (ref 10–15)
GFR SERPL CREATININE-BSD FRML MDRD: 76 ML/MIN/{1.73_M2}
GLUCOSE SERPL-MCNC: 107 MG/DL (ref 70–99)
HCT VFR BLD AUTO: 35 % (ref 35–47)
HDLC SERPL-MCNC: 54 MG/DL
HGB BLD-MCNC: 11.5 G/DL (ref 11.7–15.7)
IRON SATN MFR SERPL: 19 % (ref 15–46)
IRON SERPL-MCNC: 58 UG/DL (ref 35–180)
LDLC SERPL CALC-MCNC: 67 MG/DL
MCH RBC QN AUTO: 30.1 PG (ref 26.5–33)
MCHC RBC AUTO-ENTMCNC: 32.9 G/DL (ref 31.5–36.5)
MCV RBC AUTO: 92 FL (ref 78–100)
NONHDLC SERPL-MCNC: 103 MG/DL
PLATELET # BLD AUTO: 219 10E9/L (ref 150–450)
POTASSIUM SERPL-SCNC: 3.9 MMOL/L (ref 3.4–5.3)
PROT SERPL-MCNC: 7.8 G/DL (ref 6.8–8.8)
RBC # BLD AUTO: 3.82 10E12/L (ref 3.8–5.2)
SODIUM SERPL-SCNC: 133 MMOL/L (ref 133–144)
TIBC SERPL-MCNC: 304 UG/DL (ref 240–430)
TRIGL SERPL-MCNC: 179 MG/DL
WBC # BLD AUTO: 8.2 10E9/L (ref 4–11)

## 2019-05-07 PROCEDURE — 80061 LIPID PANEL: CPT | Performed by: INTERNAL MEDICINE

## 2019-05-07 PROCEDURE — 85027 COMPLETE CBC AUTOMATED: CPT | Performed by: INTERNAL MEDICINE

## 2019-05-07 PROCEDURE — 83540 ASSAY OF IRON: CPT | Performed by: INTERNAL MEDICINE

## 2019-05-07 PROCEDURE — 99397 PER PM REEVAL EST PAT 65+ YR: CPT | Performed by: INTERNAL MEDICINE

## 2019-05-07 PROCEDURE — 36415 COLL VENOUS BLD VENIPUNCTURE: CPT | Performed by: INTERNAL MEDICINE

## 2019-05-07 PROCEDURE — 80053 COMPREHEN METABOLIC PANEL: CPT | Performed by: INTERNAL MEDICINE

## 2019-05-07 PROCEDURE — 99214 OFFICE O/P EST MOD 30 MIN: CPT | Mod: 25 | Performed by: INTERNAL MEDICINE

## 2019-05-07 PROCEDURE — 83550 IRON BINDING TEST: CPT | Performed by: INTERNAL MEDICINE

## 2019-05-07 RX ORDER — ATORVASTATIN CALCIUM 20 MG/1
20 TABLET, FILM COATED ORAL DAILY
Qty: 90 TABLET | Refills: 3 | Status: SHIPPED | OUTPATIENT
Start: 2019-05-07 | End: 2020-06-16

## 2019-05-07 RX ORDER — ALBUTEROL SULFATE 90 UG/1
2 AEROSOL, METERED RESPIRATORY (INHALATION) EVERY 4 HOURS PRN
Qty: 18 G | Refills: 5 | Status: SHIPPED | OUTPATIENT
Start: 2019-05-07 | End: 2020-07-10

## 2019-05-07 RX ORDER — LISINOPRIL AND HYDROCHLOROTHIAZIDE 12.5; 2 MG/1; MG/1
2 TABLET ORAL EVERY MORNING
Qty: 180 TABLET | Refills: 3 | Status: SHIPPED | OUTPATIENT
Start: 2019-05-07 | End: 2020-06-17

## 2019-05-07 ASSESSMENT — MIFFLIN-ST. JEOR: SCORE: 1439.83

## 2019-05-07 NOTE — LETTER
5/8/2019      Marilia Palma  [address]  [facility] [address]      Dear Ms. Marilia BRANTLEY Louie:    I am writing to inform you of the results of the laboratory tests you had done recently.    Total Cholesterol:   Lab Results   Component Value Date    CHOL 157 05/07/2019          (Recommended: below 200)  HDL (good) Cholesterol :   Lab Results   Component Value Date    HDL 54 05/07/2019         (Recommended: 40 or more)  LDL (bad) Cholesterol:    Lab Results   Component Value Date    LDL 67 05/07/2019          (Recommended: below 130, below 100 if heart disease or diabetes is diagnosed)  Triglycerides:    Lab Results   Component Value Date    TRIG 179 05/07/2019       (Recommended: below 180)  Non HDL cholesterol (Cholesterol ratio:   Lab Results   Component Value Date    CHOLHDLRATIO 5.5 10/02/2015    NHDL 103 05/07/2019     (Ideally below 130, Acceptable below 160).    Additional results of your recent labs are as noted.  Liver function: NORMAL  Kidney function: NORMAL  Hemoglobin: NORMAL  Electrolytes: NORMAL  Glucose: NORMAL  Iron:  NORMAL    Your labs alll look good, including the blood count which has bascially return to normal.  Continue all medications at the same doses.  Contact your usual pharmacy if you need refills.     Your low blood counts last year were from inflammation in your stomach ( gastritis) most likely from Helicobacter Pylori bacteria.  The antibiotics you receive were to treat this Helicobacter Pylori.  He wanted you to have a follow up stool test to confirm that this Helicobacter Pylori infection was eradicated.  If you did not complete this stool test, I would like to have you do it.  You can come to our [facility] lab and collect the necessary materials to perform this test at home and then bring it back to us.  I will let you know the results when I get them.  It is important to confirm that this infection was successful because he can return.    Thank you for allowing  me to participate in your care. If you have any further questions or problems, please contact me via our nurse line at [phone]    Sincerely,          Chris Bhatia M.D.  Department of Internal Medicine  [facility]

## 2019-05-07 NOTE — PATIENT INSTRUCTIONS
"*  Rechecking labs today, I will let you know if we need to change any medications.     *  I am rechecking the blood counts again today.  If the blood counts and iron levels are still low, I will have you take an iron supplement.     *  Continue all medications at the same doses.  Contact your usual pharmacy if you need refills.     *  Use the albuterol inhaler if needed.     *  Return to see me in 1 year, sooner if needed.  Call [phone] to schedule this appointment.     5 GOALS TO PREVENT VASCULAR DISEASE:     1.  Aggressive blood pressure control, under 130/80 ideally.  Using medications if needed.    Your blood pressure is under good control    BP Readings from Last 4 Encounters:   05/07/19 124/80   03/27/18 111/57   02/20/18 114/82   01/30/18 102/66       2.  Aggressive LDL cholesterol (\"bad cholesterol\") lowering as indicated.    Your goal is an LDL under 130 for sure, preferably under 100.  (If you have diabetes or previous vascular disease, the the LDL goals would be under 100 for sure, preferably under 70.)    New guidelines identify four high-risk groups who could benefit from statins:   *people with pre-existing heart disease, such as those who have had a heart attack;   *people ages 40 to 75 who have diabetes of any type  *patients ages 40 to 75 with at least a 7.5% risk of developing cardiovascular disease over the next decade, according to a formula described in the guidelines  *patients with the sort of super-high cholesterol that sometimes runs in families, as evidenced by an LDL of 190 milligrams per deciliter or higher    Your cholesterol levels are well controlled.    Recent Labs   Lab Test 01/30/18  1122 02/15/17  0806 10/02/15  0758 07/07/15  0803   CHOL 121 167 273* 215*   HDL 56 51 50* 48*   LDL 47 84 183* 131*   TRIG 91 160* 200* 178*   CHOLHDLRATIO  --   --  5.5* 4.5       3.  Aggressive diabetic prevention, screening and/or management.      You do not have diabetes as of the most recent " "blood tests.     4.  No smoking    5.  Consider Daily aspirin: Have a discussion about the relative benefits and risks of taking daily low dose aspirin (81 mg) tablet once per day over the age of 50, unless there is a specific reason that you cannot take aspirin (such as side effect, allergy, or you are on another \"blood thinner\").        --Due to prior problems with mild anemia ( low blood count), you should NOT take daily aspirin.            SHINGLES VACCINE:        I would recommend that you consider getting a \"shingles vaccine\".  The shingles vaccine does not stop you from getting shingles, but it decreases the intensity of the event, the duration of the event, and decreases the painful nerve condition that results     There are two options available for shingles vaccines:     --Shingrix: 2 shots, give 2-6 months apart  **recommended**   OR   --Zostavax, one shot       Based on the available data, the Shingrix vaccine has superior benefit and should be considered even if you have had the Zostavax vaccine before.         Contact your insurance provider and ask them if either shingles vaccine is covered and is so, how much it will cost you.  Usually this will be cheaper to get in a pharmacy given by the pharmacist.       Regardless of the coverage, I would recommend that you consider the vaccine since shingles is very painful, (just ask anyone who has ever had it).            Preventive Health Recommendations  Female Ages 40 to 49    Yearly exam:     See your health care provider every year in order to  1. Review health changes.   2. Discuss preventive care.    3. Review your medicines if your doctor prescribed any.      Get a Pap test every three years (unless you have an abnormal result and your provider advises testing more often).      If you get Pap tests with HPV test, you only need to test every 5 years, unless you have an abnormal result. You do not need a Pap test if your uterus was removed (hysterectomy) " "and you have not had cancer.      You should be tested each year for STDs (sexually transmitted diseases), if you're at risk.       Ask your doctor if you should have a mammogram.      Have a colonoscopy (test for colon cancer) if someone in your family has had colon cancer or polyps before age 50.       Have a cholesterol test every 5 years.       Have a diabetes test (fasting glucose) after age 45. If you are at risk for diabetes, you should have this test every 3 years.    Shots: Get a flu shot each year. Get a tetanus shot every 10 years.     Nutrition:     Eat at least 5 servings of fruits and vegetables each day.    Eat and brown rice instead of white grains and rice.    Talk to your provider about Calcium and Vitamin D.        --Good Grains:  Oats, brown rice, Quinoa (these do not raise the blood sugar as much)     --Bad grains:  Anything made from wheat or white rice     (because these raise the blood sugars significantly, and the possible gluten issue from wheat for some people).      --Proteins:  Aim for \"lean proteins\" including chicken, fish, seafood, pork, turkey, and eggs (in moderation); Eat red meat only occasionally            Lifestyle    Exercise at least 150 minutes a week (an average of 30 minutes a day, 5 days a week). This will help you control your weight and prevent disease.    Limit alcohol to one drink per day.    No smoking.     Wear sunscreen to prevent skin cancer.    See your dentist every six months for an exam and cleaning.      *  OBESITY/WEIGHT LOSS:  ====================================================    *  Obesity is a major problem in this country.  Over 2/3 of adult Americans are overweight (BMI Over 25), and 1/3 are obese (BMI over 30)    *  Obesity is the leading cause of diabetes type II, which currently affects 1 out of 10 adult Americans and is projected to potentially affect 1 out of 3 adult Americans by 2040    *  Chronic obesity leads to \"insulin resistance\" which " "affects the carbohydrate (sugar) metabolism causing \"diabetisy\" which leads to type II Diabetes, increased visceral fat, a chronic low grade inflammatory state that leads to higher rates of vascular disease among other things.     *  Obesity is defined as BMI (body mass index) greater than 30.    *  Morbid obesity is defined as BMI over 40    Your most recent Body mass index is:  Body mass index is 37.91 kg/m .    The main principles in weight loss are diet and exercise. You need to have both.      Dietary principles are aimed at keeping the fat content in your diet to less than 30% of total calories AND minimizing the simple carbohydrates (breads, sugars, pasta, etc.).  Complex carbohydrates such as wild rice, brown rice, legumes and most vegetables are OK.    *  Think \"Eat like a caveman\", eat \"primitive\" foods.    *  Keep your food shopping to the outside of the grocery store, do not eat foods that come from a bag or box, do not eat foods with bar codes.    *  Use the fork rule for all eating. [If you can't pick food up with a fork, then the food will not turn off hunger very well. Using this rule helps patients avoid choosing the wrong types of food, especially if you have had a bariatric surgery operation.   The \"wrong foods\" include liquid calories such as soup, juice, soda, slimfast, ice cream, and beer, crumbly foods such as chips, crackers, and cookies, and starch based foods such as pasta, too much rice, and too much bread.]     * Keep your calorie intake at least less than 1500 per day to start (under 1300 total if you want to be more aggressive), divided between 3 meals (try to have no meal more than 500 calories) and 2 snacks.      *  \"Learn what 500 calories looks like\" and try to keep all meals under 500 calories.      *  Drink one large glass of water BEFORE each meal.  This not only helps guard against dehydration, but it also helps provide additional \"fullness\" that will help reduce the amount of " "food that you will consume in a given meal by helping you fill up earlier.      *  Figure out what kind of calories you are taking in now at this time.  It is hard to change behaviors that you do not understand.      *  Eat breakfast every day.  Skipping meals has been shown to be one of the factors that correlates the highest with obesity, (even more than fast food).  Try to avoid the typical carbohydrates and sugars that dominate the typical American breakfast.  Try to get more protein in earlier in the day, this will make you less hungry later.       *  Try High Protein Ensure or Boost nutritional supplements (or similar versions) for breakfast if nothing else.      *  Avoid \"manufactured foods\" as much as possible.  My definition of a \"manufactured\" or \"processed\" food is any single food product that has more than 6 ingredients.     *  Keep your grocery shopping to the \"outside\" of the grocery store, this is where all the \"real food\" is located.      *  Try to limit all simple carbohydrate foods such as white breads (whole grain natural breads are OK), white rice (brown rice and wild rice are OK), pasta, noodles, \"snack foods\", candy, jam/jellies, cakes, pies, sweets, regular soda (sugar free is OK).    *  Limit the amount of citrus fruits such as oranges, trell, grapefruit.  These contain a large amount of sugars and will raise your blood sugars very high.  Try to keep less than 3 pieces of fruit per day.  Grapefruits specifically can interfere with the metabolism of \"statin\" cholesterol lowering drugs and therefore should be avoided completely if you are on a statin medication.      *  Always eat three meals a day every day:  breakfast, lunch, and supper.    *  I do not recommend over the counter diet aids such as Metabolife or Dexatrim since they have a high risk of side effects mainly fast heart rate, insomnia, and nervousness.    *  Very hard to lose weight with diet changes alone.  You need to have " "regular exercise.  You should aim for either 3 hours per week total of cumulative physical aerobic activity or 10,000 steps per day of activity.  Buy a pedometer and keep track of your activity.  If your typical daily activity does not add up to 10,000 total steps, then make up the difference with walking or some sort of aerobic activity.          Good resources for nutrition are:    ---> \"Wheat Belly\" by Behzad Huynh  (a book about the many bad things processed wheat products (i.e. Bread) have caused in our country...which I believe has serious merit)       --->  \"The Paleo Diet\"  By Sylvia Slaughter.             --->\"In Defense of Food\"  Of \"Food Rules\" (Julio Cesar Monroy) a book that goes into the causes obesity epidemic in our country    Jayy Monroy:                    ---> \"Picture Perfect Weight Loss\" by Jon Altamirano (another good book about choices)        ---> \"Eat This, Not That\" Series,  by Octavio Albarado  (a book about food choices in the modern world)              ---> \"The Blood Sugar Solution\" by Mundo Mckeon ( a book about obesity and insulin resistance leading to \"diabesity\")           Calorie Naun ( a guidebook for counting calories, and knowing the components of the food that you eat)       \"The Best Life Diet\"  (a complete diet program)             Aim for a Healthy Weight Web Page at www.nhlbi.nih.gov       [place] Diet       Mikey Uribe:  \"Eat More, Weigh Less\" or \"The Program for the Reversal of Heart Disease\"       [facility] web site  the food and nutrition link.       American Heart Association       American Diabetes Association (www.diabetes.org)               "

## 2019-05-13 ENCOUNTER — ANCILLARY PROCEDURE (OUTPATIENT)
Dept: MAMMOGRAPHY | Facility: CLINIC | Age: 74
End: 2019-05-13
Payer: COMMERCIAL

## 2019-05-13 DIAGNOSIS — Z12.31 VISIT FOR SCREENING MAMMOGRAM: ICD-10-CM

## 2019-05-13 PROCEDURE — 77067 SCR MAMMO BI INCL CAD: CPT | Mod: TC

## 2019-05-31 DIAGNOSIS — A04.8 H. PYLORI INFECTION: ICD-10-CM

## 2019-05-31 PROCEDURE — 87338 HPYLORI STOOL AG IA: CPT | Performed by: INTERNAL MEDICINE

## 2019-06-03 ENCOUNTER — APPOINTMENT (OUTPATIENT)
Dept: GENERAL RADIOLOGY | Facility: CLINIC | Age: 74
End: 2019-06-03
Attending: EMERGENCY MEDICINE
Payer: COMMERCIAL

## 2019-06-03 ENCOUNTER — HOSPITAL ENCOUNTER (EMERGENCY)
Facility: CLINIC | Age: 74
Discharge: HOME | End: 2019-06-03
Attending: EMERGENCY MEDICINE | Admitting: EMERGENCY MEDICINE
Payer: COMMERCIAL

## 2019-06-03 VITALS
RESPIRATION RATE: 23 BRPM | HEART RATE: 110 BPM | BODY MASS INDEX: 37.92 KG/M2 | OXYGEN SATURATION: 92 % | WEIGHT: 214 LBS | HEIGHT: 63 IN | DIASTOLIC BLOOD PRESSURE: 64 MMHG | TEMPERATURE: 97.7 F | SYSTOLIC BLOOD PRESSURE: 119 MMHG

## 2019-06-03 DIAGNOSIS — R06.2 WHEEZING: ICD-10-CM

## 2019-06-03 DIAGNOSIS — R06.02 SOB (SHORTNESS OF BREATH): ICD-10-CM

## 2019-06-03 DIAGNOSIS — R05.9 COUGH: ICD-10-CM

## 2019-06-03 LAB
ANION GAP SERPL CALCULATED.3IONS-SCNC: 3 MMOL/L (ref 3–14)
BASE EXCESS BLDV CALC-SCNC: 3.5 MMOL/L
BASOPHILS # BLD AUTO: 0 10E9/L (ref 0–0.2)
BASOPHILS NFR BLD AUTO: 0.4 %
BUN SERPL-MCNC: 22 MG/DL (ref 7–30)
CALCIUM SERPL-MCNC: 9.5 MG/DL (ref 8.5–10.1)
CHLORIDE SERPL-SCNC: 101 MMOL/L (ref 94–109)
CO2 SERPL-SCNC: 32 MMOL/L (ref 20–32)
CREAT SERPL-MCNC: 0.91 MG/DL (ref 0.52–1.04)
DIFFERENTIAL METHOD BLD: NORMAL
EOSINOPHIL # BLD AUTO: 0.7 10E9/L (ref 0–0.7)
EOSINOPHIL NFR BLD AUTO: 6.9 %
ERYTHROCYTE [DISTWIDTH] IN BLOOD BY AUTOMATED COUNT: 13.4 % (ref 10–15)
GFR SERPL CREATININE-BSD FRML MDRD: 62 ML/MIN/{1.73_M2}
GLUCOSE SERPL-MCNC: 160 MG/DL (ref 70–99)
H PYLORI AG STL QL IA: NORMAL
HCO3 BLDV-SCNC: 30 MMOL/L (ref 21–28)
HCT VFR BLD AUTO: 36.1 % (ref 35–47)
HGB BLD-MCNC: 12 G/DL (ref 11.7–15.7)
IMM GRANULOCYTES # BLD: 0.1 10E9/L (ref 0–0.4)
IMM GRANULOCYTES NFR BLD: 0.5 %
INTERPRETATION ECG - MUSE: NORMAL
LYMPHOCYTES # BLD AUTO: 1.6 10E9/L (ref 0.8–5.3)
LYMPHOCYTES NFR BLD AUTO: 14.8 %
MCH RBC QN AUTO: 30.2 PG (ref 26.5–33)
MCHC RBC AUTO-ENTMCNC: 33.2 G/DL (ref 31.5–36.5)
MCV RBC AUTO: 91 FL (ref 78–100)
MONOCYTES # BLD AUTO: 0.6 10E9/L (ref 0–1.3)
MONOCYTES NFR BLD AUTO: 5.5 %
NEUTROPHILS # BLD AUTO: 7.6 10E9/L (ref 1.6–8.3)
NEUTROPHILS NFR BLD AUTO: 71.9 %
NRBC # BLD AUTO: 0 10*3/UL
NRBC BLD AUTO-RTO: 0 /100
O2/TOTAL GAS SETTING VFR VENT: ABNORMAL %
OXYHGB MFR BLDV: 59 %
PCO2 BLDV: 53 MM HG (ref 40–50)
PH BLDV: 7.36 PH (ref 7.32–7.43)
PLATELET # BLD AUTO: 225 10E9/L (ref 150–450)
PO2 BLDV: 32 MM HG (ref 25–47)
POTASSIUM SERPL-SCNC: 3.9 MMOL/L (ref 3.4–5.3)
RBC # BLD AUTO: 3.97 10E12/L (ref 3.8–5.2)
SODIUM SERPL-SCNC: 136 MMOL/L (ref 133–144)
SPECIMEN SOURCE: NORMAL
WBC # BLD AUTO: 10.5 10E9/L (ref 4–11)

## 2019-06-03 PROCEDURE — 25000131 ZZH RX MED GY IP 250 OP 636 PS 637: Performed by: EMERGENCY MEDICINE

## 2019-06-03 PROCEDURE — 93005 ELECTROCARDIOGRAM TRACING: CPT

## 2019-06-03 PROCEDURE — 71046 X-RAY EXAM CHEST 2 VIEWS: CPT

## 2019-06-03 PROCEDURE — 82805 BLOOD GASES W/O2 SATURATION: CPT | Performed by: EMERGENCY MEDICINE

## 2019-06-03 PROCEDURE — 80048 BASIC METABOLIC PNL TOTAL CA: CPT | Performed by: EMERGENCY MEDICINE

## 2019-06-03 PROCEDURE — 85025 COMPLETE CBC W/AUTO DIFF WBC: CPT | Performed by: EMERGENCY MEDICINE

## 2019-06-03 PROCEDURE — 25000125 ZZHC RX 250: Performed by: EMERGENCY MEDICINE

## 2019-06-03 PROCEDURE — 94640 AIRWAY INHALATION TREATMENT: CPT

## 2019-06-03 PROCEDURE — 99285 EMERGENCY DEPT VISIT HI MDM: CPT | Mod: 25

## 2019-06-03 RX ORDER — IPRATROPIUM BROMIDE AND ALBUTEROL SULFATE 2.5; .5 MG/3ML; MG/3ML
3 SOLUTION RESPIRATORY (INHALATION) ONCE
Status: COMPLETED | OUTPATIENT
Start: 2019-06-03 | End: 2019-06-03

## 2019-06-03 RX ORDER — PREDNISONE 20 MG/1
60 TABLET ORAL DAILY
Qty: 15 TABLET | Refills: 0 | Status: SHIPPED | OUTPATIENT
Start: 2019-06-03 | End: 2019-06-19

## 2019-06-03 RX ORDER — AZITHROMYCIN 250 MG/1
TABLET, FILM COATED ORAL
Qty: 6 TABLET | Refills: 0 | Status: SHIPPED | OUTPATIENT
Start: 2019-06-03 | End: 2019-06-19

## 2019-06-03 RX ORDER — PREDNISONE 20 MG/1
60 TABLET ORAL ONCE
Status: COMPLETED | OUTPATIENT
Start: 2019-06-03 | End: 2019-06-03

## 2019-06-03 RX ADMIN — PREDNISONE 60 MG: 20 TABLET ORAL at 03:48

## 2019-06-03 RX ADMIN — IPRATROPIUM BROMIDE AND ALBUTEROL SULFATE 3 ML: .5; 3 SOLUTION RESPIRATORY (INHALATION) at 04:11

## 2019-06-03 RX ADMIN — IPRATROPIUM BROMIDE AND ALBUTEROL SULFATE 3 ML: .5; 3 SOLUTION RESPIRATORY (INHALATION) at 03:16

## 2019-06-03 ASSESSMENT — MIFFLIN-ST. JEOR: SCORE: 1434.83

## 2019-06-03 ASSESSMENT — ENCOUNTER SYMPTOMS
COUGH: 1
WHEEZING: 1
RHINORRHEA: 1
SHORTNESS OF BREATH: 1

## 2019-06-03 NOTE — ED NOTES
Pt feels that breathing is improved after 2nd neb treatment. Lung sounds less diminished. Increased exp wheezing. RR 22. O2 sat 95% on RA. .

## 2019-06-03 NOTE — ED AVS SNAPSHOT
Emergency Department  [address] [place] [id number]  Phone:  [phone]  Fax:  [phone]                                    Marilia Palma   MRN: [id number]    Department:   Emergency Department   Date of Visit:  6/3/2019           After Visit Summary Signature Page    I have received my discharge instructions, and my questions have been answered. I have discussed any challenges I see with this plan with the nurse or doctor.    ..........................................................................................................................................  Patient/Patient Representative Signature      ..........................................................................................................................................  Patient Representative Print Name and Relationship to Patient    ..................................................               ................................................  Date                                   Time    ..........................................................................................................................................  Reviewed by Signature/Title    ...................................................              ..............................................  Date                                               Time          22EPIC Rev 08/18

## 2019-06-03 NOTE — TELEPHONE ENCOUNTER
"Requested Prescriptions   Pending Prescriptions Disp Refills     montelukast (SINGULAIR) 10 MG tablet [Pharmacy Med Name: MONTELUKAST 10MG TAB] 30 tablet 1     Sig: TAKE ONE TABLET BY MOUTH AT BEDTIME  Last Written Prescription Date:  01/30/18  Last Fill Quantity: 30,  # refills: 1   Last office visit: 5/7/2019 with prescribing provider:  05/07/19   Future Office Visit:  0       Leukotriene Inhibitors Protocol Passed - 6/3/2019  9:32 AM        Passed - Patient is age 12 or older     If patient is under 16, ok to refill using age based dosing.           Passed - Recent (12 mo) or future (30 days) visit within the authorizing provider's specialty     Patient had office visit in the last 12 months or has a visit in the next 30 days with authorizing provider or within the authorizing provider's specialty.  See \"Patient Info\" tab in inbasket, or \"Choose Columns\" in Meds & Orders section of the refill encounter.              Passed - Medication is active on med list        "

## 2019-06-03 NOTE — ED PROVIDER NOTES
"  History     Chief Complaint:    Shortness of Breath    The history is provided by the patient and a relative.      Marilia Palma is a 74 year old female with a history of bronchitis, hypertension and hyperlipidemia who presents with shortness of breath and wheezing. The patient states that the symptoms started 2-3 days ago. The patient states that she was walking upstairs and then felt like she couldn't catch her breath. Tonight, the patient woke up at around 0100 and felt difficulty with breathing so she came to the ED for further evaluation. The patient has been coughing and is congested. The patient notes that she did notice she has a runny nose more often when she is outside.     Allergies:  No Known Allergies     Medications:    Albuterol  Lipitor  Prinzide/Zestoretic  Montelukast    Past Medical History:    Deaf  H. Pylori infection  Hypertension  Hyperlipidemia  obesity  Anemia    Past Surgical History:    EGD combined    Family History:    Cerebrovascular disease  Hypertension    Social History:  Smoking status: No  Alcohol use: No  Drug use: No  PCP: Chris Bhatia  Presents to the ED with a family member  Marital Status:        Review of Systems   HENT: Positive for congestion and rhinorrhea.    Respiratory: Positive for cough, shortness of breath and wheezing.    Cardiovascular: Negative for chest pain.   Gastrointestinal: Negative for abdominal pain, nausea and vomiting.   All other systems reviewed and are negative.    Physical Exam     Patient Vitals for the past 24 hrs:   BP Temp Temp src Pulse Heart Rate Resp SpO2 Height Weight   06/03/19 0528 119/64 -- -- -- 102 -- 92 % -- --   06/03/19 0502 -- -- -- -- -- -- 93 % -- --   06/03/19 0413 152/57 -- -- 110 107 23 100 % -- --   06/03/19 0246 (!) 196/102 97.7  F (36.5  C) Oral 125 125 22 92 % 1.6 m (5' 3\") 97.1 kg (214 lb)     Physical Exam  General: Appears well-developed and well-nourished.   Head: No signs of trauma. "   Mouth/Throat: Oropharynx is clear and moist.   CV: Tachycardic and regular rhythm.    Resp: Effort normal with expiratory wheezing. No respiratory distress.   GI: Soft. There is no tenderness.  No rebound or guarding.  Normal bowel sounds.   MSK: Normal range of motion. no edema. No Calf tenderness.  Neuro: The patient is alert and oriented.  Speech normal.  GCS 15  Skin: Skin is warm and dry. No rash noted.   Psych: normal mood and affect. behavior is normal.       Emergency Department Course   ECG (3:12:53):  Rate 118 bpm. CO interval 160. QRS duration 72. QT/QTc 320/448. P-R-T axes 74 36 66. Sinus tachycardia. Low voltage QRS. Cannot rule out Anterior infarct, age undetermined. Abnormal ECG. No old EKG. Interpreted at 0343 by Matt Galeano MD.    Imaging:    Chest XR:  Shallow inspiration. Slight indistinctness of left  hemidiaphragm on the PA view could indicate mild atelectasis or  infiltrate at the left base, though this is not confirmed on the  lateral view. Lungs otherwise clear. Normal heart size.  ARTURO AMBRIZ MD  Reading per radiology    Laboratory:  Blood gas: 7.36/53(H)/32/30(H)  CBC: WBC 10.5 HGB 12.0   BMP: Glucose 160 (H) o/w WNL (Creatinine 0.91)    Interventions:  0316: Duoneb 3 mL nebulization   0348: Prednisone 60 mg PO  0411: Duoneb 3 mL nebulization     Emergency Department Course:  0331: Nursing notes and vitals reviewed. I performed an exam of the patient as documented above.     IV inserted. Medicine administered as documented above. Blood drawn. This was sent to the lab for further testing, results above.    The patient was sent for a chest x-ray while in the emergency department, findings above.     An EKG was performed, findings above    0439: I rechecked the patient and discussed the results of her workup thus far.     Findings and plan explained to the Patient. Patient discharged home with instructions regarding supportive care, medications, and reasons to return.  The importance of close follow-up was reviewed. The patient was prescribed azithromycin and prednisone.    I personally reviewed the laboratory results with the Patient and answered all related questions prior to discharge.     Impression & Plan      Medical Decision Making:  Marilia Palma is a 74-year-old woman who presents due to shortness of breath and wheezing.  She has had symptoms over the last few days and it is worse when she is outside.  On my evaluation, she did have clear and expiratory wheezing although there was not significant respiratory distress.  Blood work was obtained that was overall non-concerning without signs of significant CO2 retention.  Her primary care doctor had given her a prescription for albuterol inhaler along with Singulair. The patient has not been using the Singulair.  Apparently she had a similar episode approximately a year ago with bronchitis.  Chest x-ray was obtained that did show signs of possible infiltrate although it was only on a single view.  After receiving prednisone and nebulizer, patient felt considerably better with improvement of her air movement and wheezing.  She continued to be satting quite appropriately on room air.  In the setting, I felt the patient was appropriate for discharge home and outpatient management.  I recommended that she start using the Singulair and she is also given a burst of steroid.  I did elect to give a prescription for azithromycin for the possibility of pneumonia is it has also been shown to be benefit of reactive airway disease as well.  She was given an AeroChamber to use with her inhaler to make that more effective and easier for her to use and she is recommend to follow the primary care doctor return to the ER for any worsening symptoms or further concerns.  She had been tachycardic after receiving nebs, but her HR did improve with time.    Critical Care time:  none    Diagnosis:    ICD-10-CM   1. Wheezing R06.2   2. SOB  (shortness of breath) R06.02     Disposition: discharged to home    Discharge Medications:  azithromycin 250 MG tablet  Commonly known as:  ZITHROMAX Z-DOE  Two tablets on the first day, then one tablet daily for the next 4 days     predniSONE 20 MG tablet  Commonly known as:  DELTASONE  60 mg, Oral, DAILY       Scribe Disclosure  I, Gissel Cabello, am serving as a scribe at 3:31 AM on 6/3/2019 to document services personally performed by Matt Galeano MD based on my observations and the provider's statements to me.     Gissel Cabello  6/3/2019    EMERGENCY DEPARTMENT       Matt Galeano MD  06/05/19 0697

## 2019-06-03 NOTE — ED NOTES
Pt reports that breathing feels a little better after neb treatment. Lungs with increased expiratory wheezing but lungs less diminshed.  RR 22. O2 sat 95% on RA.

## 2019-06-04 RX ORDER — MONTELUKAST SODIUM 10 MG/1
TABLET ORAL
Qty: 30 TABLET | Refills: 1 | Status: SHIPPED | OUTPATIENT
Start: 2019-06-04 | End: 2020-07-10

## 2019-06-05 ASSESSMENT — ENCOUNTER SYMPTOMS
NAUSEA: 0
VOMITING: 0
ABDOMINAL PAIN: 0

## 2019-06-12 ENCOUNTER — APPOINTMENT (OUTPATIENT)
Dept: GENERAL RADIOLOGY | Facility: CLINIC | Age: 74
End: 2019-06-12
Attending: EMERGENCY MEDICINE
Payer: COMMERCIAL

## 2019-06-12 ENCOUNTER — HOSPITAL ENCOUNTER (EMERGENCY)
Facility: CLINIC | Age: 74
Discharge: HOME | End: 2019-06-12
Attending: EMERGENCY MEDICINE | Admitting: EMERGENCY MEDICINE
Payer: COMMERCIAL

## 2019-06-12 VITALS
DIASTOLIC BLOOD PRESSURE: 78 MMHG | HEIGHT: 67 IN | SYSTOLIC BLOOD PRESSURE: 110 MMHG | TEMPERATURE: 98.6 F | HEART RATE: 110 BPM | WEIGHT: 220 LBS | RESPIRATION RATE: 19 BRPM | BODY MASS INDEX: 34.53 KG/M2 | OXYGEN SATURATION: 93 %

## 2019-06-12 DIAGNOSIS — J44.1 COPD EXACERBATION (H): ICD-10-CM

## 2019-06-12 LAB
ALBUMIN SERPL-MCNC: 4.1 G/DL (ref 3.4–5)
ALP SERPL-CCNC: 119 U/L (ref 40–150)
ALT SERPL W P-5'-P-CCNC: 30 U/L (ref 0–50)
ANION GAP SERPL CALCULATED.3IONS-SCNC: 7 MMOL/L (ref 3–14)
AST SERPL W P-5'-P-CCNC: 19 U/L (ref 0–45)
BASOPHILS # BLD AUTO: 0.1 10E9/L (ref 0–0.2)
BASOPHILS NFR BLD AUTO: 0.4 %
BILIRUB SERPL-MCNC: 0.4 MG/DL (ref 0.2–1.3)
BUN SERPL-MCNC: 21 MG/DL (ref 7–30)
CALCIUM SERPL-MCNC: 9.5 MG/DL (ref 8.5–10.1)
CHLORIDE SERPL-SCNC: 101 MMOL/L (ref 94–109)
CO2 BLDCOV-SCNC: 30 MMOL/L (ref 21–28)
CO2 SERPL-SCNC: 28 MMOL/L (ref 20–32)
CREAT SERPL-MCNC: 0.82 MG/DL (ref 0.52–1.04)
DIFFERENTIAL METHOD BLD: ABNORMAL
EOSINOPHIL # BLD AUTO: 1 10E9/L (ref 0–0.7)
EOSINOPHIL NFR BLD AUTO: 6.7 %
ERYTHROCYTE [DISTWIDTH] IN BLOOD BY AUTOMATED COUNT: 13.6 % (ref 10–15)
GFR SERPL CREATININE-BSD FRML MDRD: 70 ML/MIN/{1.73_M2}
GLUCOSE SERPL-MCNC: 137 MG/DL (ref 70–99)
HCT VFR BLD AUTO: 38.7 % (ref 35–47)
HGB BLD-MCNC: 13 G/DL (ref 11.7–15.7)
IMM GRANULOCYTES # BLD: 0.2 10E9/L (ref 0–0.4)
IMM GRANULOCYTES NFR BLD: 1.1 %
LACTATE BLD-SCNC: 0.9 MMOL/L (ref 0.7–2.1)
LYMPHOCYTES # BLD AUTO: 2 10E9/L (ref 0.8–5.3)
LYMPHOCYTES NFR BLD AUTO: 13 %
MCH RBC QN AUTO: 30.4 PG (ref 26.5–33)
MCHC RBC AUTO-ENTMCNC: 33.6 G/DL (ref 31.5–36.5)
MCV RBC AUTO: 90 FL (ref 78–100)
MONOCYTES # BLD AUTO: 0.8 10E9/L (ref 0–1.3)
MONOCYTES NFR BLD AUTO: 5.1 %
NEUTROPHILS # BLD AUTO: 11.2 10E9/L (ref 1.6–8.3)
NEUTROPHILS NFR BLD AUTO: 73.7 %
NRBC # BLD AUTO: 0 10*3/UL
NRBC BLD AUTO-RTO: 0 /100
PCO2 BLDV: 49 MM HG (ref 40–50)
PH BLDV: 7.39 PH (ref 7.32–7.43)
PLATELET # BLD AUTO: 226 10E9/L (ref 150–450)
PO2 BLDV: 36 MM HG (ref 25–47)
POTASSIUM SERPL-SCNC: 4.2 MMOL/L (ref 3.4–5.3)
PROT SERPL-MCNC: 8.1 G/DL (ref 6.8–8.8)
RBC # BLD AUTO: 4.28 10E12/L (ref 3.8–5.2)
SAO2 % BLDV FROM PO2: 68 %
SODIUM SERPL-SCNC: 136 MMOL/L (ref 133–144)
TROPONIN I SERPL-MCNC: <0.015 UG/L (ref 0–0.04)
WBC # BLD AUTO: 15.1 10E9/L (ref 4–11)

## 2019-06-12 PROCEDURE — 25000125 ZZHC RX 250

## 2019-06-12 PROCEDURE — 96365 THER/PROPH/DIAG IV INF INIT: CPT

## 2019-06-12 PROCEDURE — 71046 X-RAY EXAM CHEST 2 VIEWS: CPT

## 2019-06-12 PROCEDURE — 84484 ASSAY OF TROPONIN QUANT: CPT | Performed by: EMERGENCY MEDICINE

## 2019-06-12 PROCEDURE — 85025 COMPLETE CBC W/AUTO DIFF WBC: CPT | Performed by: EMERGENCY MEDICINE

## 2019-06-12 PROCEDURE — 94640 AIRWAY INHALATION TREATMENT: CPT

## 2019-06-12 PROCEDURE — 25000128 H RX IP 250 OP 636: Performed by: EMERGENCY MEDICINE

## 2019-06-12 PROCEDURE — 99285 EMERGENCY DEPT VISIT HI MDM: CPT | Mod: 25

## 2019-06-12 PROCEDURE — 80053 COMPREHEN METABOLIC PANEL: CPT | Performed by: EMERGENCY MEDICINE

## 2019-06-12 PROCEDURE — 25000125 ZZHC RX 250: Performed by: EMERGENCY MEDICINE

## 2019-06-12 PROCEDURE — 82803 BLOOD GASES ANY COMBINATION: CPT

## 2019-06-12 PROCEDURE — 96375 TX/PRO/DX INJ NEW DRUG ADDON: CPT

## 2019-06-12 PROCEDURE — 83605 ASSAY OF LACTIC ACID: CPT

## 2019-06-12 RX ORDER — IPRATROPIUM BROMIDE AND ALBUTEROL SULFATE 2.5; .5 MG/3ML; MG/3ML
SOLUTION RESPIRATORY (INHALATION)
Status: COMPLETED
Start: 2019-06-12 | End: 2019-06-12

## 2019-06-12 RX ORDER — METHYLPREDNISOLONE SODIUM SUCCINATE 125 MG/2ML
125 INJECTION, POWDER, LYOPHILIZED, FOR SOLUTION INTRAMUSCULAR; INTRAVENOUS ONCE
Status: COMPLETED | OUTPATIENT
Start: 2019-06-12 | End: 2019-06-12

## 2019-06-12 RX ORDER — IPRATROPIUM BROMIDE AND ALBUTEROL SULFATE 2.5; .5 MG/3ML; MG/3ML
3 SOLUTION RESPIRATORY (INHALATION) ONCE
Status: COMPLETED | OUTPATIENT
Start: 2019-06-12 | End: 2019-06-12

## 2019-06-12 RX ORDER — MAGNESIUM SULFATE HEPTAHYDRATE 40 MG/ML
2 INJECTION, SOLUTION INTRAVENOUS ONCE
Status: COMPLETED | OUTPATIENT
Start: 2019-06-12 | End: 2019-06-12

## 2019-06-12 RX ORDER — ALBUTEROL SULFATE 0.83 MG/ML
5 SOLUTION RESPIRATORY (INHALATION) EVERY 4 HOURS PRN
Qty: 75 ML | Refills: 0 | Status: SHIPPED | OUTPATIENT
Start: 2019-06-12 | End: 2022-03-02

## 2019-06-12 RX ORDER — ALBUTEROL SULFATE 0.83 MG/ML
5 SOLUTION RESPIRATORY (INHALATION) EVERY 4 HOURS PRN
Qty: 1 BOX | Refills: 0 | Status: SHIPPED | OUTPATIENT
Start: 2019-06-12 | End: 2019-06-19

## 2019-06-12 RX ORDER — IPRATROPIUM BROMIDE AND ALBUTEROL SULFATE 2.5; .5 MG/3ML; MG/3ML
3 SOLUTION RESPIRATORY (INHALATION)
Status: COMPLETED | OUTPATIENT
Start: 2019-06-12 | End: 2019-06-12

## 2019-06-12 RX ORDER — PREDNISONE 10 MG/1
TABLET ORAL
Qty: 32 TABLET | Refills: 0 | Status: SHIPPED | OUTPATIENT
Start: 2019-06-12 | End: 2019-06-22

## 2019-06-12 RX ADMIN — IPRATROPIUM BROMIDE AND ALBUTEROL SULFATE 3 ML: .5; 3 SOLUTION RESPIRATORY (INHALATION) at 19:42

## 2019-06-12 RX ADMIN — MAGNESIUM SULFATE HEPTAHYDRATE 2 G: 40 INJECTION, SOLUTION INTRAVENOUS at 19:50

## 2019-06-12 RX ADMIN — IPRATROPIUM BROMIDE AND ALBUTEROL SULFATE 3 ML: .5; 3 SOLUTION RESPIRATORY (INHALATION) at 20:01

## 2019-06-12 RX ADMIN — METHYLPREDNISOLONE SODIUM SUCCINATE 125 MG: 125 INJECTION, POWDER, FOR SOLUTION INTRAMUSCULAR; INTRAVENOUS at 19:41

## 2019-06-12 RX ADMIN — IPRATROPIUM BROMIDE AND ALBUTEROL SULFATE 3 ML: .5; 3 SOLUTION RESPIRATORY (INHALATION) at 20:30

## 2019-06-12 RX ADMIN — IPRATROPIUM BROMIDE AND ALBUTEROL SULFATE 3 ML: .5; 3 SOLUTION RESPIRATORY (INHALATION) at 19:16

## 2019-06-12 ASSESSMENT — ENCOUNTER SYMPTOMS
FEVER: 0
SHORTNESS OF BREATH: 1
CHILLS: 0
WHEEZING: 1
COUGH: 1

## 2019-06-12 ASSESSMENT — MIFFLIN-ST. JEOR: SCORE: 1525.54

## 2019-06-12 NOTE — ED TRIAGE NOTES
Pt reports increased SOB throughout the day, using inhalers and nebs at home but not helping- using more than prescribed

## 2019-06-12 NOTE — ED AVS SNAPSHOT
Emergency Department  [address] [place] [address]  Phone:  [phone]  Fax:  [phone]                                    Marilia Palma   MRN: [id number]    Department:   Emergency Department   Date of Visit:  6/12/2019           After Visit Summary Signature Page    I have received my discharge instructions, and my questions have been answered. I have discussed any challenges I see with this plan with the nurse or doctor.    ..........................................................................................................................................  Patient/Patient Representative Signature      ..........................................................................................................................................  Patient Representative Print Name and Relationship to Patient    ..................................................               ................................................  Date                                   Time    ..........................................................................................................................................  Reviewed by Signature/Title    ...................................................              ..............................................  Date                                               Time          22EPIC Rev 08/18

## 2019-06-13 NOTE — ED PROVIDER NOTES
History     Chief Complaint:  Shortness of Breath    HPI   HPI translated using an iPad  as the patient is deaf and uses ASL to communicate.    Marilia Palma is a 74 year old female who presents with shortness of breath. The patient was seen here 6/3/19 for wheezing and shortness of breath, which woke her from sleep. At that time, she had associated upper respiratory symptoms including cough, rhinorrhea, and congestion. Here in the ED, she underwent ECG, laboratory analysis, and chest x-ray, which were all within normal limits. She received two Duonebs and a dose of oral Prednisone and was ultimately discharged home with Azithromycin and Prednisone x5 days. Since then, the patient reports she finished the medications and has been using her inhalers and nebs as needed. She states she has not followed up with her primary care physician despite continuing to have symptoms including cough and intermittent shortness of breath. Today, the patient reports her shortness of breath significantly worsened and was not alleviated by her home inhalers or nebulizations, so she ultimately decided to return to the ED for further evaluation. Here, the patient also endorses some slightly increased swelling in her left leg consistent with previous ankle sprain swelling as well as chest pressure with coughing, but denies any significant chest pain, fevers, chills, or other acute symptoms.    Allergies:  No known drug allergies    Medications:    Albuterol inhaler  Aspirin  Atorvastatin  Lisinopril-hydrochlorothiazide  Singulair    Past Medical History:    Deaf  Hyperlipidemia  Hypertension  Obesity  Iron deficiency anemia    Past Surgical History:    History reviewed. No pertinent surgical history.    Family History:    Cerebrovascular disease  Hypertension    Social History:  Smoking status: No  Alcohol use: No  Drug use: No  PCP: Chris Bhatia  Presents to the ED with a family member  Marital Status:   "[2]     Review of Systems   Constitutional: Negative for chills and fever.   Respiratory: Positive for cough, shortness of breath and wheezing.    Cardiovascular: Positive for leg swelling. Negative for chest pain.   All other systems reviewed and are negative.    Physical Exam     Patient Vitals for the past 24 hrs:   BP Temp Pulse Heart Rate Resp SpO2 Height Weight   06/12/19 2026 110/78 -- 110 112 15 94 % -- --   06/12/19 2007 100/64 -- 107 105 12 98 % -- --   06/12/19 2000 -- -- -- 115 16 93 % -- --   06/12/19 1915 -- -- -- 120 24 99 % -- --   06/12/19 1913 110/84 -- 120 121 24 99 % -- --   06/12/19 1849 (!) 147/97 98.6  F (37  C) 128 -- 18 96 % 1.702 m (5' 7\") 99.8 kg (220 lb)     Physical Exam  Constitutional: Heavyset white female, supine No respiratory distress. Mildly tachypneic.  HENT: No signs of trauma.   Eyes: EOM are normal. Pupils are equal, round, and reactive to light.   Neck: Normal range of motion. No JVD present. No cervical adenopathy.  Cardiovascular: Regular rhythm. Exam reveals no gallop and no friction rub. No murmur heard.  Pulmonary/Chest: Expiratory wheezing throughout.   Abdominal: Soft. No tenderness. No rebound or guarding.   Musculoskeletal: Trace edema left foot and ankle. No tenderness.   Lymphadenopathy: No lymphadenopathy.   Neurological: Alert and oriented to person, place, and time. Normal strength. Coordination normal.   Skin: Skin is warm and dry. No rash noted. No erythema.     Emergency Department Course   Imaging:  Radiographic findings were communicated with the patient who voiced understanding of the findings.    XR Chest 2 views:  Normal.  As read by Radiology.    Laboratory:  CBC: WBC 15.1, HGB 13.0,   CMP: Glucose 137, Creatinine 0.82  Troponin: <0.015  ISTAT gases lactate venous: pH 7.39, pCO2 49, pO2 36, bicarbonate 30, O2 sat 68, lactic acid 0.9    Interventions:  1916: DuoNeb, 2.5mg/3 mL, Inhalation solution, Nebulizer   1941: 125 mg Solu-medrol IV  1942: " DuoNeb, 2.5mg/3 mL, Inhalation solution, Nebulizer   1950: 2 g Magnesium sulfate IV  2001: DuoNeb, 2.5mg/3 mL, Inhalation solution, Nebulizer   2030: DuoNeb, 2.5mg/3 mL, Inhalation solution, Nebulizer     Emergency Department Course:  Past medical records, nursing notes, and vitals reviewed.  1911: I performed an exam of the patient and obtained history, as documented above.  IV inserted and blood drawn.  The patient was sent for a chest x-ray while in the emergency department, findings above.    2041: I rechecked the patient. Findings and plan explained to the patient. Patient discharged home with instructions regarding supportive care, medications, and reasons to return. The importance of close follow-up was reviewed.     Impression & Plan    Medical Decision Making:  Marilia Palma is a 74 year old female who presents to the ED for evaluation of shortness of breath. She had been seen here nine days ago with COPD exacerbation. She was given Azithromycin and Prednisone at that time, which she finished. She has not followed up with her primary care physician and her breathing got worse again today. She has no chest pain or pressure and no history of heart disease or blood clots. Her examination reveals expiratory wheezing. She does have good oxygen saturations, however, and is not in much distress. She received nebs, IV steroids, and magnesium, and is feeling much better. There are still some wheezes with breaths, but much less than before. She also has better air exchange. Chest x-ray and blood-work are all unremarkable. The patient has a hard time using her spacer with her inhaler, but says she does much better on the nebulizers. I will provide her with a nebulizer machine and Albuterol neb solution. I will also put her on a 2-week Prednisone taper rather than just five days. The patient will be discharged home with her friend and should make a follow-up appointment with her primary care physician. Otherwise,  if her symptoms worsen, she should return to the ED. I do not think this is acute heart failure, coronary disease, or PE. There was no sign of pneumonia.    Diagnosis:    ICD-10-CM   1. COPD exacerbation (H) J44.1     Disposition: Discharged to home    Discharge Medications:  predniSONE 10 MG tablet  Commonly known as:  DELTASONE  Take 4 tablets daily for 5 days,  take 2 tablets daily for 3 days, take 1 tablet daily for 3 days, take half a tablet for 3 days.     albuterol (2.5 MG/3ML) 0.083% neb solution  Commonly known as:  PROVENTIL  5 mg, Nebulization, EVERY 4 HOURS PRN       Marianne Sam  6/12/2019    EMERGENCY DEPARTMENT    I, Marianne Sam, am serving as a scribe at 7:11 PM on 6/12/2019 to document services personally performed by Piter Neal MD based on my observations and the provider's statements to me.      Piter Neal MD  06/12/19 [id number]

## 2019-06-18 RX ORDER — CHOLECALCIFEROL (VITAMIN D3) 25 MCG
1000 TABLET ORAL DAILY
Refills: 99 | COMMUNITY
Start: 2019-05-28

## 2019-06-18 NOTE — PROGRESS NOTES
Subjective     Marilia Palma is a 74 year old female who presents to clinic today for the following health issues:    The patient does speak due to being deaf..  The patient was seen today via a sign language interpretor.       [facility]   ED/ Followup:    Facility:   ED   Date of visit: 6/12  Reason for visit: SOB   Current Status: pt state she is feeling a lot better after leaving has has zero SOB since leaving.     Receive prednisone taper from the emergency room.  Reports her breathing is significantly improved.  After discussion with the patient, she reports that she has more regular episodes of coughing and tightness in her breathing over the last 2 years then she realized.  The symptoms were mostly mild but still present specifically in the heat, humidity, cold weather, and allergy months.  Albuterol gives her good relief.  She has never taken a controller inhaler.  She does report prolonged coughing after upper respiratory illnesses.  Does not smoke.                 Reviewed and updated as needed this visit by Provider         Review of Systems         Objective    There were no vitals taken for this visit.  There is no height or weight on file to calculate BMI.  Physical Exam                 Past Medical History:  ---------------------------  Past Medical History:   Diagnosis Date     Deaf 1945     H. pylori infection 03/27/2018    H. Pylori identified on EGD biopsy.  completed 10 day triple therapy.       HTN (hypertension) 2012     Hyperlipidemia LDL goal < 130 11/28/12     pre-treatment     Obesity (BMI 30-39.9) 12/3/12    BMI 37       Past Surgical History:  ---------------------------  Past Surgical History:   Procedure Laterality Date     ESOPHAGOSCOPY, GASTROSCOPY, DUODENOSCOPY (EGD), COMBINED N/A 3/27/2018    Procedure: COMBINED ESOPHAGOSCOPY, GASTROSCOPY, DUODENOSCOPY (EGD), BIOPSY SINGLE OR MULTIPLE;;  Surgeon: George Miles MD;  Location: [facility]       Current  Medications:  ---------------------------  Current Outpatient Medications   Medication Sig Dispense Refill     albuterol (PROAIR HFA/PROVENTIL HFA/VENTOLIN HFA) 108 (90 Base) MCG/ACT inhaler Inhale 2 puffs into the lungs every 4 hours as needed for shortness of breath / dyspnea or wheezing 18 g 5     albuterol (PROVENTIL) (2.5 MG/3ML) 0.083% neb solution Take 2 vials (5 mg) by nebulization every 4 hours as needed for shortness of breath / dyspnea or wheezing 75 mL 0     ASPIRIN NOT PRESCRIBED (INTENTIONAL) Please choose reason not prescribed, below       atorvastatin (LIPITOR) 20 MG tablet Take 1 tablet (20 mg) by mouth daily 90 tablet 3     fluticasone (FLOVENT HFA) 110 MCG/ACT inhaler Inhale 1 puff into the lungs 2 times daily 3 Inhaler 3     lisinopril-hydrochlorothiazide (PRINZIDE/ZESTORETIC) 20-12.5 MG tablet Take 2 tablets by mouth every morning 180 tablet 3     montelukast (SINGULAIR) 10 MG tablet TAKE ONE TABLET BY MOUTH AT BEDTIME 30 tablet 1     predniSONE (DELTASONE) 10 MG tablet Take 4 tablets daily for 5 days,  take 2 tablets daily for 3 days, take 1 tablet daily for 3 days, take half a tablet for 3 days. 32 tablet 0     VITAMIN D3 1000 units tablet Take 1,000 tablets by mouth daily  99       Allergies:  -------------  No Known Allergies    Social History:  -------------------  Social History     Socioeconomic History     Marital status:      Spouse name: Not on file     Number of children: Not on file     Years of education: Not on file     Highest education level: Not on file   Occupational History     Not on file   Social Needs     Financial resource strain: Not on file     Food insecurity:     Worry: Not on file     Inability: Not on file     Transportation needs:     Medical: Not on file     Non-medical: Not on file   Tobacco Use     Smoking status: Never Smoker     Smokeless tobacco: Never Used   Substance and Sexual Activity     Alcohol use: No     Drug use: No     Sexual activity: Never    Lifestyle     Physical activity:     Days per week: Not on file     Minutes per session: Not on file     Stress: Not on file   Relationships     Social connections:     Talks on phone: Not on file     Gets together: Not on file     Attends Temple service: Not on file     Active member of club or organization: Not on file     Attends meetings of clubs or organizations: Not on file     Relationship status: Not on file     Intimate partner violence:     Fear of current or ex partner: Not on file     Emotionally abused: Not on file     Physically abused: Not on file     Forced sexual activity: Not on file   Other Topics Concern     Parent/sibling w/ CABG, MI or angioplasty before 65F 55M? Not Asked   Social History Narrative     Not on file       Family Medical History:  ------------------------------  Family History   Problem Relation Age of Onset     Family History Negative Mother          natural causes age 90     Cerebrovascular Disease Father 65         after atroke     Hypertension Father          ROS:  REVIEW OF SYSTEMS: (since ER)    RESP: negative for cough, dyspnea, wheezing, hemoptysis  CV: negative for chest pain, palpitations, PND, RAMIREZ, orthopnea; reports no significant changes in their ability to perform physical activity (from cardiovascular standpoint)  GI: negative for dysphagia, N/V, pain, melena, diarrhea and constipation  NEURO: negative for new numbness/tingling, paralysis, incoordination, or focal weakness     OBJECTIVE:                                                    /90   Pulse 106   Temp 98.3  F (36.8  C) (Temporal)   Wt 96.6 kg (213 lb)   SpO2 96%   BMI 33.36 kg/m       GENERAL alert and no distress  EYES:  Normal sclera,conjunctiva, EOMI  HENT: oral and posterior pharynx without lesions or erythema, facies symmetric  NECK: Neck supple. No LAD, without thyroidmegaly.  RESP: Clear to ausculation bilaterally without wheezes or crackles. Normal BS in all fields.  CV: RRR  normal S1S2 without murmurs, rubs or gallops.  LYMPH: no cervical lymph adenopathy appreciated  MS: extremities- no gross deformities of the visible extremities noted,   EXT:  no lower extremity edema  PSYCH: Alert and oriented times 3; speech- coherent  SKIN:  No obvious significant skin lesions on visible portions of face          ASSESSMENT/PLAN:                                                      (J45.30) Mild persistent asthma without complication  (primary encounter diagnosis)  Comment: Appears to be resolving nicely from her most recent asthma exacerbation.  She finished the prednisone tablets until gone.   Given her history over the last couple years, patient most likely has underlying asthma.  Given the frequency of her symptoms, even if they are mild, would recommend initiating a controller medicine.  Discussed asthma in detail and discussed how their breathing symptoms and the pattern of the shortness of breath fits with asthma.   Discussed pathophysiology of asthma and treatments based on the degree of symptoms.   Discussed triggers for asthma in general, and those specific to the patient.  Also told them to anticipate potentially more intense coughing and shortness of breath with any URI (regardless of bacterial or viral etiology) and to be prepared to use the albuterol more often if needed and to return and see me for any such exacerbation.    I recommended having rescue inhaler available and using all throughout the year as needed, demonstrated proper MDI technique for them.  Emphasized the need for them to let me know if there are any changes for the worse in their breathing related to asthma, either acute or chronic and to seek emergency care if the breathing acutely worsens in a sever manner.     Plan: fluticasone (FLOVENT HFA) 110 MCG/ACT inhaler            (R05) Cough  Comment:   Plan:       See Patient Instructions    LANEY SUMMERS M.D., MD  [facility]    (Chart  documentation may have been completed, in part, with [employer] voice-recognition software. Even though reviewed, some grammatical, spelling, and word errors may remain.)

## 2019-06-19 ENCOUNTER — OFFICE VISIT (OUTPATIENT)
Dept: INTERNAL MEDICINE | Facility: CLINIC | Age: 74
End: 2019-06-19
Payer: COMMERCIAL

## 2019-06-19 VITALS
TEMPERATURE: 98.3 F | DIASTOLIC BLOOD PRESSURE: 90 MMHG | WEIGHT: 213 LBS | HEART RATE: 106 BPM | OXYGEN SATURATION: 96 % | SYSTOLIC BLOOD PRESSURE: 120 MMHG | BODY MASS INDEX: 33.36 KG/M2

## 2019-06-19 DIAGNOSIS — J45.30 MILD PERSISTENT ASTHMA WITHOUT COMPLICATION: Primary | ICD-10-CM

## 2019-06-19 DIAGNOSIS — R05.9 COUGH: ICD-10-CM

## 2019-06-19 PROCEDURE — 99214 OFFICE O/P EST MOD 30 MIN: CPT | Performed by: INTERNAL MEDICINE

## 2019-06-19 PROCEDURE — T1013 SIGN LANG/ORAL INTERPRETER: HCPCS | Mod: U3 | Performed by: INTERNAL MEDICINE

## 2019-06-19 RX ORDER — FLUTICASONE PROPIONATE 110 UG/1
1 AEROSOL, METERED RESPIRATORY (INHALATION) 2 TIMES DAILY
Qty: 3 INHALER | Refills: 3 | Status: SHIPPED | OUTPATIENT
Start: 2019-06-19 | End: 2022-03-02

## 2019-06-19 NOTE — PATIENT INSTRUCTIONS
"ASTHMA:       *  Preventative inhaler:    Fluticasone inhaler (OR WHATEVER VERSION left eye PREFERRED BY THE INSURANCE)  take 2 puffs twice per day, every day to help control and prevent the inflammation in the airways. Fluticasone is not a \"rescue inhaler\", you should not use this inhaler for urgent breathing problem, use the Albuterol inhaler.                        *ALBUTEROL:  Use the Albuterol inhaler or albuterol nebulizer as needed for any coughing, wheezing, tightness in the breathing, or shortness of breath.   Consider using it before any known triggers for our coughing or wheezing (usually these include cold or hot temperatures, humidity, dust, air pollutants, smoke).  Expect that your airways may remain more easily irritated for a few weeks after upper respiratory infections.     If you require the albuterol rescue inhaler on a regular basis more than a few times per week, you may need additional medications to help control the breathing better.    These are the available forms of Albuterol, your insurance formulary will determine which one is preferred.  They all work the same.           "

## 2019-09-06 ENCOUNTER — DOCUMENTATION ONLY (OUTPATIENT)
Dept: LAB | Facility: CLINIC | Age: 74
End: 2019-09-06

## 2019-09-06 DIAGNOSIS — I10 ESSENTIAL HYPERTENSION, BENIGN: ICD-10-CM

## 2019-09-06 DIAGNOSIS — E78.5 HYPERLIPIDEMIA WITH TARGET LDL LESS THAN 130: ICD-10-CM

## 2019-09-10 DIAGNOSIS — I10 ESSENTIAL HYPERTENSION, BENIGN: ICD-10-CM

## 2019-09-10 DIAGNOSIS — E78.5 HYPERLIPIDEMIA WITH TARGET LDL LESS THAN 130: ICD-10-CM

## 2019-09-10 LAB
ALBUMIN SERPL-MCNC: 4 G/DL (ref 3.4–5)
ALP SERPL-CCNC: 103 U/L (ref 40–150)
ALT SERPL W P-5'-P-CCNC: 27 U/L (ref 0–50)
ANION GAP SERPL CALCULATED.3IONS-SCNC: 7 MMOL/L (ref 3–14)
AST SERPL W P-5'-P-CCNC: 19 U/L (ref 0–45)
BILIRUB SERPL-MCNC: 0.5 MG/DL (ref 0.2–1.3)
BUN SERPL-MCNC: 20 MG/DL (ref 7–30)
CALCIUM SERPL-MCNC: 9.4 MG/DL (ref 8.5–10.1)
CHLORIDE SERPL-SCNC: 102 MMOL/L (ref 94–109)
CHOLEST SERPL-MCNC: 152 MG/DL
CO2 SERPL-SCNC: 26 MMOL/L (ref 20–32)
CREAT SERPL-MCNC: 0.74 MG/DL (ref 0.52–1.04)
ERYTHROCYTE [DISTWIDTH] IN BLOOD BY AUTOMATED COUNT: 13.5 % (ref 10–15)
GFR SERPL CREATININE-BSD FRML MDRD: 78 ML/MIN/{1.73_M2}
GLUCOSE SERPL-MCNC: 121 MG/DL (ref 70–99)
HCT VFR BLD AUTO: 35.4 % (ref 35–47)
HDLC SERPL-MCNC: 48 MG/DL
HGB BLD-MCNC: 11.6 G/DL (ref 11.7–15.7)
LDLC SERPL CALC-MCNC: 73 MG/DL
MCH RBC QN AUTO: 30 PG (ref 26.5–33)
MCHC RBC AUTO-ENTMCNC: 32.8 G/DL (ref 31.5–36.5)
MCV RBC AUTO: 92 FL (ref 78–100)
NONHDLC SERPL-MCNC: 104 MG/DL
PLATELET # BLD AUTO: 212 10E9/L (ref 150–450)
POTASSIUM SERPL-SCNC: 3.9 MMOL/L (ref 3.4–5.3)
PROT SERPL-MCNC: 7.6 G/DL (ref 6.8–8.8)
RBC # BLD AUTO: 3.87 10E12/L (ref 3.8–5.2)
SODIUM SERPL-SCNC: 135 MMOL/L (ref 133–144)
TRIGL SERPL-MCNC: 155 MG/DL
WBC # BLD AUTO: 7.6 10E9/L (ref 4–11)

## 2019-09-10 PROCEDURE — 85027 COMPLETE CBC AUTOMATED: CPT | Performed by: INTERNAL MEDICINE

## 2019-09-10 PROCEDURE — 36415 COLL VENOUS BLD VENIPUNCTURE: CPT | Performed by: INTERNAL MEDICINE

## 2019-09-10 PROCEDURE — 80053 COMPREHEN METABOLIC PANEL: CPT | Performed by: INTERNAL MEDICINE

## 2019-09-10 PROCEDURE — 80061 LIPID PANEL: CPT | Performed by: INTERNAL MEDICINE

## 2019-09-19 ENCOUNTER — OFFICE VISIT (OUTPATIENT)
Dept: INTERNAL MEDICINE | Facility: CLINIC | Age: 74
End: 2019-09-19
Payer: COMMERCIAL

## 2019-09-19 VITALS
SYSTOLIC BLOOD PRESSURE: 126 MMHG | RESPIRATION RATE: 14 BRPM | TEMPERATURE: 98.1 F | WEIGHT: 215.5 LBS | OXYGEN SATURATION: 96 % | BODY MASS INDEX: 33.75 KG/M2 | HEART RATE: 103 BPM | DIASTOLIC BLOOD PRESSURE: 86 MMHG

## 2019-09-19 DIAGNOSIS — E78.5 HYPERLIPIDEMIA WITH TARGET LDL LESS THAN 130: ICD-10-CM

## 2019-09-19 DIAGNOSIS — R05.9 COUGH: ICD-10-CM

## 2019-09-19 DIAGNOSIS — I10 ESSENTIAL HYPERTENSION, BENIGN: ICD-10-CM

## 2019-09-19 DIAGNOSIS — J45.30 MILD PERSISTENT ASTHMA WITHOUT COMPLICATION: ICD-10-CM

## 2019-09-19 PROCEDURE — T1013 SIGN LANG/ORAL INTERPRETER: HCPCS | Mod: U3 | Performed by: INTERNAL MEDICINE

## 2019-09-19 PROCEDURE — 99214 OFFICE O/P EST MOD 30 MIN: CPT | Performed by: INTERNAL MEDICINE

## 2019-09-19 ASSESSMENT — ANXIETY QUESTIONNAIRES
5. BEING SO RESTLESS THAT IT IS HARD TO SIT STILL: NOT AT ALL
2. NOT BEING ABLE TO STOP OR CONTROL WORRYING: NOT AT ALL
3. WORRYING TOO MUCH ABOUT DIFFERENT THINGS: NOT AT ALL
GAD7 TOTAL SCORE: 0
6. BECOMING EASILY ANNOYED OR IRRITABLE: NOT AT ALL
GAD7 TOTAL SCORE: 0
1. FEELING NERVOUS, ANXIOUS, OR ON EDGE: NOT AT ALL
7. FEELING AFRAID AS IF SOMETHING AWFUL MIGHT HAPPEN: NOT AT ALL
7. FEELING AFRAID AS IF SOMETHING AWFUL MIGHT HAPPEN: NOT AT ALL
4. TROUBLE RELAXING: NOT AT ALL

## 2019-09-19 NOTE — PATIENT INSTRUCTIONS
"*  Be sure to get the annual influenza vaccine this fall.     *  Continue all medications at the same doses.  Contact your usual pharmacy if you need refills.     *  Return to see me in approximately middle May 2020 (after 5/7/20), sooner if needed.  Please get fasting labs done in the [facility] lab 1-2 days before this appointment (make a  \"lab appointment\").  Eat nothing for at least 8 hours prior to having these labs drawn.  Use [employer] or Call [phone] to schedule the appointment with me and lab appointment.       *  Use the Albuterol inhaler as needed for any coughing, wheezing, tightness in the breathing, or shortness of breath.   Consider using it before any known triggers for our coughing or wheezing (usually these include cold or hot temperatures, humidity, dust, air pollutants, smoke).  Expect that your airways may remain more easily irritated for a few weeks after upper respiratory infections.     If you require the albuterol rescue inhaler on a regular basis more than a few times per week, you may need additional medications to help control the breathing better.    These are the available forms of Albuterol, your insurance formulary will determine which one is preferred.  They all work the same.               *  MONTELUKAST (GENERIC SINGULAIR):  Take Montelukast (generic Singulair) 10 mg once per day every day during your main allergy and/or asthma season(s).  This medication will help reduce the inflammation inside your airways and thereby help reduce your asthma and allergy season.  It is not a \"rescue medication\" and will not get you out of acute troubles, always use your rescue albuterol inhaler if you develop any acute breathing troubles.       *  Fluticasone inhaler (controller/preventative inhaler):   2 puffs twice per day, every day to help control and prevent the inflammation in the airways. Fluticasone is not a \"rescue inhaler\", you should not use this inhaler for urgent breathing problem, use " "the Albuterol inhaler.          5 GOALS TO PREVENT VASCULAR DISEASE:     1.  Aggressive blood pressure control, under 130/80 ideally.  Using medications if needed.    Your blood pressure is under good control    BP Readings from Last 4 Encounters:   09/19/19 126/86   06/19/19 120/90   06/12/19 110/78   06/03/19 119/64       2.  Aggressive LDL cholesterol (\"bad cholesterol\") lowering as indicated.    Your goal is an LDL under 130 for sure, preferably under 100.  (If you have diabetes or previous vascular disease, the the LDL goals would be under 100 for sure, preferably under 70.)    New guidelines identify four high-risk groups who could benefit from statins:   *people with pre-existing heart disease, such as those who have had a heart attack;   *people ages 40 to 75 who have diabetes of any type  *patients ages 40 to 75 with at least a 7.5% risk of developing cardiovascular disease over the next decade, according to a formula described in the guidelines  *patients with the sort of super-high cholesterol that sometimes runs in families, as evidenced by an LDL of 190 milligrams per deciliter or higher    Your cholesterol levels are well controlled.    Recent Labs   Lab Test 09/10/19  0847 05/07/19  1051  10/02/15  0758 07/07/15  0803   CHOL 152 157   < > 273* 215*   HDL 48* 54   < > 50* 48*   LDL 73 67   < > 183* 131*   TRIG 155* 179*   < > 200* 178*   CHOLHDLRATIO  --   --   --  5.5* 4.5    < > = values in this interval not displayed.       3.  Aggressive diabetic prevention, screening and/or management.      You do not have diabetes as of the most recent blood tests.     4.  No smoking    5.  Consider Daily aspirin: Have a discussion about the relative benefits and risks of taking daily low dose aspirin (81 mg) tablet once per day over the age of 50, unless there is a specific reason that you cannot take aspirin (such as side effect, allergy, or you are on another \"blood thinner\").        --you should NOT take " aspirin due to prior side effects from aspirin.

## 2019-09-19 NOTE — PROGRESS NOTES
Subjective     Marilia Palma is a 74 year old female who presents to clinic today for the following health issues:    HPI   Hypertension Follow-up      Do you check your blood pressure regularly outside of the clinic? No     Are you following a low salt diet? Yes    Are your blood pressures ever more than 140 on the top number (systolic) OR more   than 90 on the bottom number (diastolic), for example 140/90? No  Asthma Follow-Up    Was ACT completed today?  No      Do you have a cough?  YES    Are you experiencing any wheezing in your chest?  No    Do you have any shortness of breath?  No     How often are you using a short-acting (rescue) inhaler or nebulizer, such as Albuterol?  2-3 times per day    How many days per week do you miss taking your asthma controller medication?  0    Please describe any recent triggers for your asthma: None - patient unsure what causes asthma     Have you had any Emergency Room Visits, Urgent Care Visits, or Hospital Admissions since your last office visit?  Yes  Number of ER or Urgent Care visits for asthma: twice in June          How many servings of fruits and vegetables do you eat daily?  2-3, depends     On average, how many sweetened beverages do you drink each day (soda, juice, sweet tea, etc)?   0    How many days per week do you miss taking your medication? 0                Reviewed and updated as needed this visit by Provider         Review of Systems         Objective    /86   Pulse 103   Temp 98.1  F (36.7  C) (Oral)   Resp 14   Wt 97.8 kg (215 lb 8 oz)   SpO2 96%   BMI 33.75 kg/m    Body mass index is 33.75 kg/m .  Physical Exam                 Answers for HPI/ROS submitted by the patient on 9/19/2019   BAILEE 7 TOTAL SCORE: 0        Past Medical History:  ---------------------------  Past Medical History:   Diagnosis Date     Deaf 1945     H. pylori infection 03/27/2018    H. Pylori identified on EGD biopsy.  completed 10 day triple therapy.       HTN  (hypertension) 2012     Hyperlipidemia LDL goal < 130 11/28/12     pre-treatment     Obesity (BMI 30-39.9) 12/3/12    BMI 37       Past Surgical History:  ---------------------------  Past Surgical History:   Procedure Laterality Date     ESOPHAGOSCOPY, GASTROSCOPY, DUODENOSCOPY (EGD), COMBINED N/A 3/27/2018    Procedure: COMBINED ESOPHAGOSCOPY, GASTROSCOPY, DUODENOSCOPY (EGD), BIOPSY SINGLE OR MULTIPLE;;  Surgeon: George Miles MD;  Location: [facility]       Current Medications:  ---------------------------  Current Outpatient Medications   Medication Sig Dispense Refill     albuterol (PROAIR HFA/PROVENTIL HFA/VENTOLIN HFA) 108 (90 Base) MCG/ACT inhaler Inhale 2 puffs into the lungs every 4 hours as needed for shortness of breath / dyspnea or wheezing 18 g 5     albuterol (PROVENTIL) (2.5 MG/3ML) 0.083% neb solution Take 2 vials (5 mg) by nebulization every 4 hours as needed for shortness of breath / dyspnea or wheezing 75 mL 0     ASPIRIN NOT PRESCRIBED (INTENTIONAL) Please choose reason not prescribed, below       atorvastatin (LIPITOR) 20 MG tablet Take 1 tablet (20 mg) by mouth daily 90 tablet 3     fluticasone (FLOVENT HFA) 110 MCG/ACT inhaler Inhale 1 puff into the lungs 2 times daily 3 Inhaler 3     lisinopril-hydrochlorothiazide (PRINZIDE/ZESTORETIC) 20-12.5 MG tablet Take 2 tablets by mouth every morning 180 tablet 3     montelukast (SINGULAIR) 10 MG tablet TAKE ONE TABLET BY MOUTH AT BEDTIME 30 tablet 1     VITAMIN D3 1000 units tablet Take 1,000 tablets by mouth daily  99       Allergies:  -------------  No Known Allergies    Social History:  -------------------  Social History     Socioeconomic History     Marital status:      Spouse name: Not on file     Number of children: Not on file     Years of education: Not on file     Highest education level: Not on file   Occupational History     Not on file   Social Needs     Financial resource strain: Not on file     Food insecurity:     Worry:  Not on file     Inability: Not on file     Transportation needs:     Medical: Not on file     Non-medical: Not on file   Tobacco Use     Smoking status: Never Smoker     Smokeless tobacco: Never Used   Substance and Sexual Activity     Alcohol use: No     Drug use: No     Sexual activity: Never   Lifestyle     Physical activity:     Days per week: Not on file     Minutes per session: Not on file     Stress: Not on file   Relationships     Social connections:     Talks on phone: Not on file     Gets together: Not on file     Attends Jain service: Not on file     Active member of club or organization: Not on file     Attends meetings of clubs or organizations: Not on file     Relationship status: Not on file     Intimate partner violence:     Fear of current or ex partner: Not on file     Emotionally abused: Not on file     Physically abused: Not on file     Forced sexual activity: Not on file   Other Topics Concern     Parent/sibling w/ CABG, MI or angioplasty before 65F 55M? Not Asked   Social History Narrative     Not on file       Family Medical History:  ------------------------------  Family History   Problem Relation Age of Onset     Family History Negative Mother          natural causes age 90     Cerebrovascular Disease Father 65         after atroke     Hypertension Father          ROS:  REVIEW OF SYSTEMS:    RESP: positive for cough, dyspnea, wheezing; negative for hemoptysis   CV: negative for chest pain, palpitations, PND, RAMIREZ, orthopnea  GI: negative for dysphagia, N/V, pain, melena, diarrhea and constipation  NEURO: negative for new numbness/tingling, paralysis, incoordination, or focal weakness     OBJECTIVE:                                                    /86   Pulse 103   Temp 98.1  F (36.7  C) (Oral)   Resp 14   Wt 97.8 kg (215 lb 8 oz)   SpO2 96%   BMI 33.75 kg/m       GENERAL alert and no distress  EYES:  Normal sclera,conjunctiva, EOMI  HENT: oral and posterior pharynx  without lesions or erythema, facies symmetric  NECK: Neck supple. No LAD, without thyroidmegaly.  RESP: few faint end exp wheezes with forced expiration  CV: RRR normal S1S2 without murmurs, rubs or gallops.  LYMPH: no cervical lymph adenopathy appreciated  MS: extremities- no gross deformities of the visible extremities noted,   EXT:  no lower extremity edema  PSYCH: Alert and oriented times 3; speech- coherent  SKIN:  No obvious significant skin lesions on visible portions of face          ASSESSMENT/PLAN:                                                      (R05) Cough  Comment: prob asthma   Plan:     (I10) Essential hypertension, benign  Comment: This condition is currently controlled on the current medical regimen.  Continue current therapy.   Discussed current hypertension treatment guidelines, including indications for treatment and treatment options.  Discussed the importance for aggressive management of HTN to prevent vascular complications later.  Recommended lower fat, lower carbohydrate, and lower sodium (<2000 mg)diet.  Discussed required intervals for follow up on HTN, lab studies.  Recommened pt. follow their blood pressures outside the clinic to ensure that BPs are remaining within guidelines, and to contact me if the readings are not within guidelines on a regular basis so we can adjust treatment as needed.   Plan: Comprehensive metabolic panel, CBC with         platelets            (J45.30) Mild persistent asthma without complication  Comment: needs better control   flovent  inhaled corticosteroids controller med  montelukast tablet daily  Albuterol as needed.   Discussed asthma in detail and discussed how their breathing symptoms and the pattern of the shortness of breath fits with asthma.   Discussed pathophysiology of asthma and treatments based on the degree of symptoms.   Discussed triggers for asthma in general, and those specific to the patient.  Also told them to anticipate potentially  more intense coughing and shortness of breath with any URI (regardless of bacterial or viral etiology) and to be prepared to use the albuterol more often if needed and to return and see me for any such exacerbation.    I recommended having rescue inhaler available and using all throughout the year as needed, demonstrated proper MDI technique for them.  Emphasized the need for them to let me know if there are any changes for the worse in their breathing related to asthma, either acute or chronic and to seek emergency care if the breathing acutely worsens in a sever manner.     Plan:     (E78.5) Hyperlipidemia with target LDL less than 130  Comment: Discussed current lipid results, previous results (if available) current guidelines (NCEP) for treatment and goals for lipids.  Discussed lifestyle modification, dietary changes (low fat, low simple carb) and regular aerobic exercise.  Discussed the link between dysmetabolic syndrome and impaired glucose tolerance seen in certain patterns of lipids.  Briefly discussed medication used for lipid lowering, including the statins are their possible side effects of myalgias, rhabdomyolysis, and liver toxicity.   Plan: Lipid panel reflex to direct LDL Fasting,         Comprehensive metabolic panel               See Patient Instructions    LANEY SUMMERS M.D., MD  [facility]    (Chart documentation may have been completed, in part, with [employer] voice-recognition software. Even though reviewed, some grammatical, spelling, and word errors may remain.)

## 2019-09-20 ASSESSMENT — ANXIETY QUESTIONNAIRES: GAD7 TOTAL SCORE: 0

## 2020-01-08 ENCOUNTER — ANCILLARY PROCEDURE (OUTPATIENT)
Dept: GENERAL RADIOLOGY | Facility: CLINIC | Age: 75
End: 2020-01-08
Attending: PHYSICIAN ASSISTANT
Payer: COMMERCIAL

## 2020-01-08 ENCOUNTER — OFFICE VISIT (OUTPATIENT)
Dept: INTERNAL MEDICINE | Facility: CLINIC | Age: 75
End: 2020-01-08
Payer: COMMERCIAL

## 2020-01-08 VITALS
SYSTOLIC BLOOD PRESSURE: 112 MMHG | OXYGEN SATURATION: 95 % | BODY MASS INDEX: 33.36 KG/M2 | TEMPERATURE: 98 F | WEIGHT: 213 LBS | DIASTOLIC BLOOD PRESSURE: 72 MMHG | HEART RATE: 115 BPM

## 2020-01-08 DIAGNOSIS — S99.922A FOOT INJURY, LEFT, INITIAL ENCOUNTER: Primary | ICD-10-CM

## 2020-01-08 DIAGNOSIS — S99.922A FOOT INJURY, LEFT, INITIAL ENCOUNTER: ICD-10-CM

## 2020-01-08 PROCEDURE — 99213 OFFICE O/P EST LOW 20 MIN: CPT | Performed by: PHYSICIAN ASSISTANT

## 2020-01-08 PROCEDURE — 73660 X-RAY EXAM OF TOE(S): CPT | Mod: LT

## 2020-01-08 NOTE — PATIENT INSTRUCTIONS
Icing.     Ibuprofen 600 mg up to three times a day with food/ snack     Good shoe to support the foot.

## 2020-01-08 NOTE — PROGRESS NOTES
Subjective     Marilia Palma is a 74 year old female who presents to clinic today for the following health issues:    HPI   Musculoskeletal problem/pain      Duration: 1 week    Description  Location: left foot    Intensity:  moderate, severe    Accompanying signs and symptoms: swelling and stiff when she walks    History  Previous similar problem: no   Previous evaluation:  none    Precipitating or alleviating factors:  Trauma or overuse: YES- hit foot on a chair walking in the kitchen.- jammed 1st toe.   Aggravating factors include: standing, walking and climbing stairs    Therapies tried and outcome: heat, ice , elevating and Ibuprofen and nothing seems to help    Only taking 1 OTC ibuprofen at a time, once a day and intermittently     -------------------------------------    BP Readings from Last 3 Encounters:   01/08/20 112/72   09/19/19 126/86   06/19/19 120/90    Wt Readings from Last 3 Encounters:   01/08/20 96.6 kg (213 lb)   09/19/19 97.8 kg (215 lb 8 oz)   06/19/19 96.6 kg (213 lb)                    -------------------------------------  Reviewed and updated as needed this visit by Provider         Review of Systems   ROS COMP: Constitutional, HEENT, cardiovascular, pulmonary, gi and gu systems are negative, except as otherwise noted.      Objective    /72   Pulse 115   Temp 98  F (36.7  C) (Oral)   Wt 96.6 kg (213 lb)   SpO2 95%   BMI 33.36 kg/m    Body mass index is 33.36 kg/m .  Physical Exam   GENERAL: healthy, alert and no distress  RESP: lungs clear to auscultation - no rales, rhonchi or wheezes  CV: regular rates and rhythm and normal S1 S2, no S3 or S4  MS: left foot  Swelling noted near MTP - 1st great toe   No bruising  Tenderness to palpation medial MTP area.     SKIN: no suspicious lesions or rashes    Diagnostic Test Results:  Prelim  No fx noted         Assessment & Plan     1. Foot injury, left, initial encounter    - XR Toe Left G/E 2 Views; Future     BMI:   Estimated body  "mass index is 33.36 kg/m  as calculated from the following:    Height as of 6/12/19: 1.702 m (5' 7\").    Weight as of this encounter: 96.6 kg (213 lb).   Weight management plan: Patient was referred to their PCP to discuss a diet and exercise plan.        Patient Instructions   Icing.     Ibuprofen 600 mg up to three times a day with food/ snack     Good shoe to support the foot.           Return in about 2 weeks (around 1/22/2020) for recheck if not improving, regular primary provider.    Gissel Holloway PA-C  [facility]      "

## 2020-05-04 ENCOUNTER — DOCUMENTATION ONLY (OUTPATIENT)
Dept: LAB | Facility: CLINIC | Age: 75
End: 2020-05-04

## 2020-05-04 NOTE — LETTER
May 4, 2020    Marilia Palma  [address]  [facility] [address]      Dear Marilia,    Due to need for social distancing.  I recommend postponement of your upcoming lab and clinic visits.  These appointments will be cancelled.  Please contact the office to make an appointment for after July 6th.  You can also contact us to request the need for upcoming refills.    Thank you very much for choosing [facility] Please call my office if you have any questions or concerns.      Sincerely,        Dr. Chris Bhatia/isa

## 2020-05-04 NOTE — PROGRESS NOTES
Patient has lab only appointment scheduled for 05/15/2020.   Due to current coronavirus pandemic, please consider whether these lab tests can be deferred.     Please open ORDER REVIEW, review orders, and then confirm or defer orders ASAP.  Enter <dot>keep or <dot>defer smart phrase into NOTES, complete documentation, and route encounter.  [facility]: /team  [facility]: individual provider pool  [facility] primary care:   [facility] specialty: scheduling pool

## 2020-05-04 NOTE — PROGRESS NOTES
OK to postpone routine follow up visits and labs for 1-3 months due to need for social distancing (possibly longer depending on the recommendations at that time)  Future orders adjusted.     Contact us sooner for any more acute issues.   We can send additional refills to cover this time period, let us know if any refills are needed.     (virtual visits are going to be very difficult for her since she is deaf)

## 2020-05-04 NOTE — PROGRESS NOTES
Mailed letter informing pt of postponement of upcoming appts.  Advised pt to call back to reschedule after 07/06/2020, and with any refill needs

## 2020-05-18 ENCOUNTER — OFFICE VISIT (OUTPATIENT)
Dept: URGENT CARE | Facility: URGENT CARE | Age: 75
End: 2020-05-18
Payer: COMMERCIAL

## 2020-05-18 VITALS
DIASTOLIC BLOOD PRESSURE: 81 MMHG | HEART RATE: 120 BPM | BODY MASS INDEX: 34.23 KG/M2 | WEIGHT: 213 LBS | OXYGEN SATURATION: 97 % | TEMPERATURE: 98.5 F | HEIGHT: 66 IN | SYSTOLIC BLOOD PRESSURE: 156 MMHG

## 2020-05-18 DIAGNOSIS — L02.92 BOIL: ICD-10-CM

## 2020-05-18 DIAGNOSIS — L03.317 CELLULITIS OF BUTTOCK: ICD-10-CM

## 2020-05-18 DIAGNOSIS — R73.01 ELEVATED FASTING GLUCOSE: Primary | ICD-10-CM

## 2020-05-18 PROCEDURE — 99214 OFFICE O/P EST MOD 30 MIN: CPT | Performed by: PHYSICIAN ASSISTANT

## 2020-05-18 RX ORDER — MUPIROCIN 20 MG/G
OINTMENT TOPICAL 3 TIMES DAILY
Qty: 30 G | Refills: 1 | Status: SHIPPED | OUTPATIENT
Start: 2020-05-18 | End: 2020-07-10

## 2020-05-18 RX ORDER — CLINDAMYCIN HCL 300 MG
300 CAPSULE ORAL 3 TIMES DAILY
Qty: 30 CAPSULE | Refills: 0 | Status: SHIPPED | OUTPATIENT
Start: 2020-05-18 | End: 2020-07-10

## 2020-05-18 ASSESSMENT — PAIN SCALES - GENERAL: PAINLEVEL: NO PAIN (0)

## 2020-05-18 ASSESSMENT — MIFFLIN-ST. JEOR: SCORE: 1469.97

## 2020-05-18 NOTE — PATIENT INSTRUCTIONS
Patient Education     Understanding Carbuncles    A carbuncle is a painful boil under the skin. It happens when a group of hair follicles become infected. Follicles are the tiny holes from which hair grows out of your skin.  How to say it  Cedirc   What causes carbuncles?  A carbuncle is caused by an infection with the bacteria Staphylococcus aureus. They are common on areas of the body where friction and sweat occur. They usually appear on the back of the neck, back, and thighs. This type of infection can also happen when the skin is injured, such as by a cut or bug bite.  The bacteria that causes carbuncles can spread easily from person to person. People at higher risk for boils are those with diabetes or a weak immune system. Drug users who use needles are also more likely to get them.  Symptoms of carbuncles  A carbuncle starts as a small painful bump. But it can grow quickly. It may become:    Red    Swollen    Tender  Carbuncles may ooze pus. They may also cause fever and a general feeling of illness.  Treatment for carbuncles  A carbuncle may heal on its own in a few weeks. But the pus within it needs to come out first. Treatment options include:    Warm compress. Putting a warm, wet washcloth on the boil will help the pus drain out. You should never try to pop a boil. That can cause the infection to spread.    Surgical drainage. If the boil doesn t drain on its own, your healthcare provider may need to cut into it.    Antibiotics. In some cases, oral antibiotics may be prescribed to fight the infection, especially if the carbuncle returns. You will likely have to take the medicine for 5 to 7 days. You may need to take it longer for a severe case.    Good hygiene. Proper handwashing can prevent boils from spreading and coming back. Also wash things that have been in contact with the carbuncle in hot water. This includes items such as clothing and towels.  Complications of carbuncles  The main  complication of a carbuncle is the spreading of the infection. The bacteria can infect the heart and bone. It can also lead to septic shock, an infection of the entire body.  When to call your healthcare provider  Call your healthcare provider right away if you have any of these:    Fever of 100.4 F (38 C) or higher, or as directed    Redness, swelling, or fluid leaking from a carbuncle that gets worse    Pain that gets worse    Symptoms that don t get better, or get worse    New symptoms   Date Last Reviewed: 5/1/2016 2000-2019 [employer]. [address] [place] [address]. All rights reserved. This information is not intended as a substitute for professional medical care. Always follow your healthcare professional's instructions.

## 2020-05-26 NOTE — PROGRESS NOTES
"SUBJECTIVE:  Marilia Palma is a 74 year old female who presents to the clinic today for a rash.  Onset of rash was 4 day(s) ago.   Rash is still present.  Location of the rash: gluteal region.  Quality/symptoms of rash: erythema, warmth and tenderness   Symptoms are mild and moderate and rash seems to be stable.  Previous history of a similar rash? Yes  Recent exposure history: none  Recent new medications: none  Associated symptoms include: open wound with honey crusted lesions.    Past Medical History:   Diagnosis Date     1945     H. pylori infection 2018    H. Pylori identified on EGD biopsy.  completed 10 day triple therapy.       HTN (hypertension)      Hyperlipidemia LDL goal < 130 12     pre-treatment     Obesity (BMI 30-39.9) 12/3/12    BMI 37     ALLERGIES   No Known Allergies     Family History   Problem Relation Age of Onset     Family History Negative Mother          natural causes age 90     Cerebrovascular Disease Father 65         after atroke     Hypertension Father        Social History     Tobacco Use     Smoking status: Never Smoker     Smokeless tobacco: Never Used   Substance Use Topics     Alcohol use: No       ROS:  CONSTITUTIONAL:NEGATIVE for fever, chills, change in weight  INTEGUMENTARY/SKIN: POSITIVE for skin lesion, infection, open wound on leg  ENT/MOUTH: NEGATIVE for ear, mouth and throat problems  RESP:NEGATIVE for significant cough or SOB  CV: NEGATIVE for chest pain, palpitations or peripheral edema  GI: NEGATIVE for nausea, abdominal pain, heartburn, or change in bowel habits  : negative for and dysuria  MUSCULOSKELETAL: POSITIVE  for sore on leg  NEURO: NEGATIVE for weakness, dizziness or paresthesias    EXAM:   BP (!) 156/81 (BP Location: Right arm, Patient Position: Sitting, Cuff Size: Adult Regular)   Pulse 120   Temp 98.5  F (36.9  C)   Ht 1.664 m (5' 5.5\")   Wt 96.6 kg (213 lb)   SpO2 97%   BMI 34.91 kg/m    GENERAL: alert, no " acute distress.  SKIN: Rash description:    Distribution: localized  Location: gluteal area    Color: honey crust and red,  Lesion type: macular, isolated with warmth, tenderness, swelling and inflammation  GENERAL APPEARANCE: healthy, alert and no distress  EYES: EOMI,  PERRL, conjunctiva clear  NECK: supple, non-tender to palpation, no adenopathy noted  RESP: lungs clear to auscultation - no rales, rhonchi or wheezes  CV: regular rates and rhythm, normal S1 S2, no murmur noted  ABDOMEN:  soft, nontender, no HSM or masses and bowel sounds normal  NEURO: Normal strength and tone, sensory exam grossly normal,  normal speech and mentation    ASSESSMENT/PLAN      ICD-10-CM    1. Elevated fasting glucose  R73.01    2. Boil  L02.92 clindamycin (CLEOCIN) 300 MG capsule     mupirocin (BACTROBAN) 2 % external ointment   3. Cellulitis of buttock  L03.317 clindamycin (CLEOCIN) 300 MG capsule     mupirocin (BACTROBAN) 2 % external ointment       Orders Placed This Encounter     clindamycin (CLEOCIN) 300 MG capsule     mupirocin (BACTROBAN) 2 % external ointment     Warm moist compresses  motrin  1) monitor for any worsening symptoms  2) Follow-up with primary clinic if not improving

## 2020-06-16 DIAGNOSIS — E78.5 HYPERLIPIDEMIA WITH TARGET LDL LESS THAN 130: ICD-10-CM

## 2020-06-16 DIAGNOSIS — I10 ESSENTIAL HYPERTENSION, BENIGN: ICD-10-CM

## 2020-06-16 RX ORDER — ATORVASTATIN CALCIUM 20 MG/1
TABLET, FILM COATED ORAL
Qty: 90 TABLET | Refills: 0 | Status: SHIPPED | OUTPATIENT
Start: 2020-06-16 | End: 2020-07-10

## 2020-06-17 RX ORDER — LISINOPRIL AND HYDROCHLOROTHIAZIDE 12.5; 2 MG/1; MG/1
TABLET ORAL
Qty: 180 TABLET | Refills: 0 | Status: SHIPPED | OUTPATIENT
Start: 2020-06-17 | End: 2020-07-10

## 2020-07-07 DIAGNOSIS — I10 ESSENTIAL HYPERTENSION, BENIGN: ICD-10-CM

## 2020-07-07 DIAGNOSIS — E78.5 HYPERLIPIDEMIA WITH TARGET LDL LESS THAN 130: ICD-10-CM

## 2020-07-07 DIAGNOSIS — R73.01 ELEVATED FASTING GLUCOSE: Primary | ICD-10-CM

## 2020-07-07 LAB
ALBUMIN SERPL-MCNC: 4 G/DL (ref 3.4–5)
ALP SERPL-CCNC: 81 U/L (ref 40–150)
ALT SERPL W P-5'-P-CCNC: 24 U/L (ref 0–50)
ANION GAP SERPL CALCULATED.3IONS-SCNC: 7 MMOL/L (ref 3–14)
AST SERPL W P-5'-P-CCNC: 19 U/L (ref 0–45)
BILIRUB SERPL-MCNC: 0.5 MG/DL (ref 0.2–1.3)
BUN SERPL-MCNC: 26 MG/DL (ref 7–30)
CALCIUM SERPL-MCNC: 9.4 MG/DL (ref 8.5–10.1)
CHLORIDE SERPL-SCNC: 101 MMOL/L (ref 94–109)
CHOLEST SERPL-MCNC: 177 MG/DL
CO2 SERPL-SCNC: 27 MMOL/L (ref 20–32)
CREAT SERPL-MCNC: 0.76 MG/DL (ref 0.52–1.04)
ERYTHROCYTE [DISTWIDTH] IN BLOOD BY AUTOMATED COUNT: 13.3 % (ref 10–15)
GFR SERPL CREATININE-BSD FRML MDRD: 76 ML/MIN/{1.73_M2}
GLUCOSE SERPL-MCNC: 128 MG/DL (ref 70–99)
HCT VFR BLD AUTO: 38.8 % (ref 35–47)
HDLC SERPL-MCNC: 50 MG/DL
HGB BLD-MCNC: 12.4 G/DL (ref 11.7–15.7)
LDLC SERPL CALC-MCNC: 91 MG/DL
MCH RBC QN AUTO: 29.7 PG (ref 26.5–33)
MCHC RBC AUTO-ENTMCNC: 32 G/DL (ref 31.5–36.5)
MCV RBC AUTO: 93 FL (ref 78–100)
NONHDLC SERPL-MCNC: 127 MG/DL
PLATELET # BLD AUTO: 206 10E9/L (ref 150–450)
POTASSIUM SERPL-SCNC: 3.9 MMOL/L (ref 3.4–5.3)
PROT SERPL-MCNC: 8.3 G/DL (ref 6.8–8.8)
RBC # BLD AUTO: 4.17 10E12/L (ref 3.8–5.2)
SODIUM SERPL-SCNC: 135 MMOL/L (ref 133–144)
TRIGL SERPL-MCNC: 178 MG/DL
WBC # BLD AUTO: 7.6 10E9/L (ref 4–11)

## 2020-07-07 PROCEDURE — 85027 COMPLETE CBC AUTOMATED: CPT | Performed by: INTERNAL MEDICINE

## 2020-07-07 PROCEDURE — 83036 HEMOGLOBIN GLYCOSYLATED A1C: CPT | Performed by: INTERNAL MEDICINE

## 2020-07-07 PROCEDURE — 80061 LIPID PANEL: CPT | Performed by: INTERNAL MEDICINE

## 2020-07-07 PROCEDURE — 36415 COLL VENOUS BLD VENIPUNCTURE: CPT | Performed by: INTERNAL MEDICINE

## 2020-07-07 PROCEDURE — 80053 COMPREHEN METABOLIC PANEL: CPT | Performed by: INTERNAL MEDICINE

## 2020-07-08 LAB — HBA1C MFR BLD: 5.9 % (ref 0–5.6)

## 2020-07-10 ENCOUNTER — OFFICE VISIT (OUTPATIENT)
Dept: INTERNAL MEDICINE | Facility: CLINIC | Age: 75
End: 2020-07-10
Payer: COMMERCIAL

## 2020-07-10 VITALS
DIASTOLIC BLOOD PRESSURE: 76 MMHG | BODY MASS INDEX: 40.63 KG/M2 | OXYGEN SATURATION: 96 % | HEART RATE: 100 BPM | HEIGHT: 62 IN | SYSTOLIC BLOOD PRESSURE: 136 MMHG | TEMPERATURE: 97.6 F | WEIGHT: 220.8 LBS

## 2020-07-10 DIAGNOSIS — J45.30 MILD PERSISTENT ASTHMA WITHOUT COMPLICATION: ICD-10-CM

## 2020-07-10 DIAGNOSIS — Z00.00 MEDICARE ANNUAL WELLNESS VISIT, SUBSEQUENT: Primary | ICD-10-CM

## 2020-07-10 DIAGNOSIS — E78.5 HYPERLIPIDEMIA WITH TARGET LDL LESS THAN 130: ICD-10-CM

## 2020-07-10 DIAGNOSIS — R05.9 COUGH: ICD-10-CM

## 2020-07-10 DIAGNOSIS — I10 ESSENTIAL HYPERTENSION, BENIGN: ICD-10-CM

## 2020-07-10 DIAGNOSIS — R73.03 PREDIABETES: ICD-10-CM

## 2020-07-10 DIAGNOSIS — E66.01 MORBID OBESITY WITH BMI OF 40.0-44.9, ADULT (H): ICD-10-CM

## 2020-07-10 PROCEDURE — G0438 PPPS, INITIAL VISIT: HCPCS | Performed by: INTERNAL MEDICINE

## 2020-07-10 PROCEDURE — 99214 OFFICE O/P EST MOD 30 MIN: CPT | Mod: 25 | Performed by: INTERNAL MEDICINE

## 2020-07-10 RX ORDER — LISINOPRIL AND HYDROCHLOROTHIAZIDE 12.5; 2 MG/1; MG/1
2 TABLET ORAL EVERY MORNING
Qty: 180 TABLET | Refills: 3 | Status: SHIPPED | OUTPATIENT
Start: 2020-07-10 | End: 2021-10-06

## 2020-07-10 RX ORDER — ALBUTEROL SULFATE 90 UG/1
2 AEROSOL, METERED RESPIRATORY (INHALATION) EVERY 4 HOURS PRN
Qty: 18 G | Refills: 5 | Status: SHIPPED | OUTPATIENT
Start: 2020-07-10 | End: 2022-03-02

## 2020-07-10 RX ORDER — MONTELUKAST SODIUM 10 MG/1
1 TABLET ORAL DAILY
Qty: 90 TABLET | Refills: 3 | Status: SHIPPED | OUTPATIENT
Start: 2020-07-10 | End: 2022-03-02

## 2020-07-10 RX ORDER — ATORVASTATIN CALCIUM 20 MG/1
20 TABLET, FILM COATED ORAL DAILY
Qty: 90 TABLET | Refills: 3 | Status: SHIPPED | OUTPATIENT
Start: 2020-07-10 | End: 2021-10-06

## 2020-07-10 ASSESSMENT — ACTIVITIES OF DAILY LIVING (ADL): CURRENT_FUNCTION: NO ASSISTANCE NEEDED

## 2020-07-10 ASSESSMENT — ASTHMA QUESTIONNAIRES
QUESTION_3 LAST FOUR WEEKS HOW OFTEN DID YOUR ASTHMA SYMPTOMS (WHEEZING, COUGHING, SHORTNESS OF BREATH, CHEST TIGHTNESS OR PAIN) WAKE YOU UP AT NIGHT OR EARLIER THAN USUAL IN THE MORNING: NOT AT ALL
ACT_TOTALSCORE: 21
QUESTION_1 LAST FOUR WEEKS HOW MUCH OF THE TIME DID YOUR ASTHMA KEEP YOU FROM GETTING AS MUCH DONE AT WORK, SCHOOL OR AT HOME: NONE OF THE TIME
QUESTION_2 LAST FOUR WEEKS HOW OFTEN HAVE YOU HAD SHORTNESS OF BREATH: ONCE OR TWICE A WEEK
QUESTION_4 LAST FOUR WEEKS HOW OFTEN HAVE YOU USED YOUR RESCUE INHALER OR NEBULIZER MEDICATION (SUCH AS ALBUTEROL): TWO OR THREE TIMES PER WEEK
QUESTION_5 LAST FOUR WEEKS HOW WOULD YOU RATE YOUR ASTHMA CONTROL: WELL CONTROLLED

## 2020-07-10 ASSESSMENT — MIFFLIN-ST. JEOR: SCORE: 1436.85

## 2020-07-10 NOTE — PATIENT INSTRUCTIONS
"*  Continue all medications at the same doses.  Contact your usual pharmacy if you need refills.     *  Blood pressure and cholesterol are well controlled on the current medications    *  Return to see me in 1 year, sooner if needed.  Please get fasting labs done at the [facility] or any other [facility] lab 1-2 days before this appointment (schedule a \"lab appointment\" for this).  If you get the labs done at another [facility], make arrangements with them directly.  The orders will be in place.  Eat nothing for at least 8 hours prior to having these labs drawn.  Use [employer] or Call [phone] to schedule the appointment with me and lab appointment.     *  SHINGLES VACCINE:        I would recommend that you consider getting a \"shingles vaccine\".  The shingles vaccine does not stop you from getting shingles, but it decreases the intensity of the event, the duration of the event, and decreases the painful nerve condition that results     There are two options available for shingles vaccines:     --Shingrix: 2 shots, give 2-6 months apart  **recommended**   OR   --Zostavax, one shot       Based on the available data, the Shingrix vaccine has superior benefit and should be considered even if you have had the Zostavax vaccine before.         Contact your insurance provider and ask them if either shingles vaccine is covered and is so, how much it will cost you.  Usually this will be cheaper to get in a pharmacy given by the pharmacist.       Regardless of the coverage, I would recommend that you consider the vaccine since shingles is very painful, (just ask anyone who has ever had it).          5 GOALS TO PREVENT VASCULAR DISEASE:     1.  Aggressive blood pressure control, under 130/80 ideally.  Using medications if needed.    Your blood pressure is under good control    BP Readings from Last 4 Encounters:   07/10/20 (!) 152/80   05/18/20 (!) 156/81   01/08/20 112/72   09/19/19 126/86       2.  " "Aggressive LDL cholesterol (\"bad cholesterol\") lowering as indicated.    Your goal is an LDL under 130 for sure, preferably under 100.  (If you have diabetes or previous vascular disease, the the LDL goals would be under 100 for sure, preferably under 70.)    New guidelines identify four high-risk groups who could benefit from statins:   *people with pre-existing heart disease, such as those who have had a heart attack;   *people ages 40 to 75 who have diabetes of any type  *patients ages 40 to 75 with at least a 7.5% risk of developing cardiovascular disease over the next decade, according to a formula described in the guidelines  *patients with the sort of super-high cholesterol that sometimes runs in families, as evidenced by an LDL of 190 milligrams per deciliter or higher    Your cholesterol levels are well controlled.    Recent Labs   Lab Test 07/07/20  0913 09/10/19  0847  10/02/15  0758 07/07/15  0803   CHOL 177 152   < > 273* 215*   HDL 50 48*   < > 50* 48*   LDL 91 73   < > 183* 131*   TRIG 178* 155*   < > 200* 178*   CHOLHDLRATIO  --   --   --  5.5* 4.5    < > = values in this interval not displayed.       3.  Aggressive diabetic prevention, screening and/or management.      You do not have diabetes as of the most recent blood tests.     4.  No smoking    5.  Consider Daily aspirin: Have a discussion about the relative benefits and risks of taking daily low dose aspirin (81 mg) tablet once per day over the age of 50, unless there is a specific reason that you cannot take aspirin (such as side effect, allergy, or you are on another \"blood thinner\").        --Based on your current cardiac risk factors, you should take regular daily Aspirin 81 mg once per day, unless you have any reasons (side effects, intolerance, etc.) that you cannot take aspirin.       Preventive Health Recommendations  Female Ages 75+    Yearly exam: See your health care provider every year in order to  o Review health changes. " "  o Discuss preventive care.    o Review your medicines if your doctor has prescribed any.      Get a Pap test every three years (unless you have an abnormal result and your provider advises testing more often).    No more PAP smear needed.      You do not need a Pap test if your uterus was removed (hysterectomy) and you have not had cancer.    You should be tested each year for STDs (sexually transmitted diseases) if you're at risk.     Routine screening mammogram no longer indicated.    No routine screening colonoscopy indiacted     Have a cholesterol test every 5 years, or more often if advised.    Have a diabetes test (fasting glucose) every three years. If you are at risk for diabetes, you should have this test more often.     If you are at risk for osteoporosis (brittle bone disease), think about having a bone density scan (DEXA).    Shots:   *  Get a flu shot each year.   *  Get a tetanus shot every 10 years.    *  Pneumonia vaccine up to date.      Nutrition:     Eat at least 5 servings of fruits and vegetables each day.    Eat whole-grain bread, whole-wheat pasta and brown rice instead of white grains and rice.    Talk to your provider about Calcium and Vitamin D.    --Calcium: aim for 1200 mg per day (any brand is fine)   --Vitamin D3 at least 1000 units per day (any brand is fine)       --Good Grains:  Oats, brown rice, Quinoa (these do not raise the blood sugar as much)     --Bad grains:  Anything made from wheat or white rice     (because these raise the blood sugars significantly, and the possible gluten issue from wheat for some people).      --Proteins:  Aim for \"lean proteins\" including chicken, fish, seafood, pork, turkey, and eggs (in moderation); Eat red meat only occasionally        Lifestyle    Exercise at least 150 minutes a week (30 minutes a day, 5 days a week). This will help you control your weight and prevent disease.    Limit alcohol to one drink per day.    No smoking.     Wear " sunscreen to prevent skin cancer.     See your dentist every six months for an exam and cleaning.    See your eye doctor every 1 to 2 years.          Patient Education   Personalized Prevention Plan  You are due for the preventive services outlined below.  Your care team is available to assist you in scheduling these services.  If you have already completed any of these items, please share that information with your care team to update in your medical record.  Health Maintenance Due   Topic Date Due     Osteoporosis Screening  1945     Asthma Action Plan - yearly  1945     Asthma Control Test  1945     Discuss Advance Care Planning  12/03/2017     PHQ-2  01/01/2020     Annual Wellness Visit  05/07/2020     FALL RISK ASSESSMENT  05/07/2020       Exercise for a Healthier Heart     Exercise with a friend. When activity is fun, you're more likely to stick with it.   You may wonder how you can improve the health of your heart. If you re thinking about exercise, you re on the right track. You don t need to become an athlete, but you do need a certain amount of brisk exercise to help strengthen your heart. If you have been diagnosed with a heart condition, your doctor may recommend exercise to help stabilize your condition. To help make exercise a habit, choose safe, fun activities.  Be sure to check with your healthcare provider before starting an exercise program.  Why exercise?  Exercising regularly offers many healthy rewards. It can help you do all of the following:    Improve your blood cholesterol level to help prevent further heart trouble    Lower your blood pressure to help prevent a stroke or heart attack    Control diabetes, or reduce your risk of getting this disease    Improve your heart and lung function    Reach and maintain a healthy weight    Make your muscles stronger and more limber so you can stay active    Prevent falls and fractures by slowing the loss of bone mass  "(osteoporosis)    Manage stress better    Reduce your blood pressure    Improve your sense of self and your body image  Exercise tips  Ease into your routine. Set small goals. Then build on them.  Exercise on most days. Aim for a total of 150 or more minutes of moderate to  vigorous intensity activity each week. Consider 40 minutes, 3 to 4 times a week. For best results, activity should last for 40 minutes on average. It is OK to work up to the 40 minute period over time. Examples of moderate-intensity activity is walking 1 mile in 15 minutes or 30 to 45 minutes of yard work.  Step up your daily activity level. Along with your exercise program, try being more active throughout the day. Walk instead of drive. Do more household tasks or yard work.  Choose one or more activities you enjoy. Walking is one of the easiest things you can do. You can also try swimming, riding a bike, dancing, or taking an exercise class.  Stop exercising and call your doctor if you:    Have chest pain or feel dizzy or lightheaded    Feel burning, tightness, pressure, or heaviness in your chest, neck, shoulders, back, or arms    Have unusual shortness of breath    Have increased joint or muscle pain    Have palpitations or an irregular heartbeat  Date Last Reviewed: 5/1/2016 2000-2019 [employer]. [address] [place] [address]. All rights reserved. This information is not intended as a substitute for professional medical care. Always follow your healthcare professional's instructions.        OBESITY/WEIGHT LOSS:     *  Obesity is a major problem in this country.  Over 2/3 of adult Americans are overweight (BMI Over 25), and 1/3 are obese (BMI over 30)    *  Obesity is the leading cause of diabetes type II, which currently affects 1 out of 10 adult Americans and is projected to potentially affect 1 out of 3 adult Americans by 2040    *  Chronic obesity leads to \"insulin resistance\" which affects the carbohydrate " "(sugar) metabolism causing \"diabetisy\" which leads to type II Diabetes, increased visceral fat, a chronic low grade inflammatory state that leads to higher rates of vascular disease among other things.     *  Obesity is defined as BMI (body mass index) greater than 30.    *  Morbid obesity is defined as BMI over 40    Your most recent Body mass index is:  Body mass index is 41.04 kg/m .    The main principles in weight loss are diet and exercise. You need to have both.      + Be physically active. Do activities that you enjoy, give you energy and are safe for you to do.Gradually build up the intensity (how hard your body is working) of activity. Long-term, aim for 10,000 steps OR 30 minutes or more of activity most days of the week.     +  It is very hard to lose weight with diet changes alone.  You need to have regular exercise.  You should aim for either 3 hours total per week total of cumulative physical aerobic activity or approximately 10,000 steps per day of activity.  Buy a pedometer or other fitness tracker and keep track of your activity.  If your typical daily activity does not add up to 10,000 total steps, then make up the difference with walking or some sort of aerobic activity.        + Eat \"real food\" (not processed), I.e. Never eat a single food item with more than 6 ingredients listed on the label.    +  Eat mostly vegetables, fruit, whole grains and lean proteins. That way, you--not food manufacturers--control the ingredients that go into your meals.    +  Keep your food shopping to the outside of the grocery store, do not eat foods that come from a bag or box, do not eat foods with bar codes.    + Aim for 5 servings of fruits and vegetables a day. Choose a variety of vegetables with different colors. Have fresh fruit for dessert. Limit  deep-fried vegetables, such as french fries.    + Choose lean protein, such as chicken or fish. Try non-meat sources of protein such as beans, soy and other " "legumes.    + Choose whole grains. Whole grain foods, such as whole-wheat bread, brown rice, barley, quinoa and oatmeal, contain the entire grain kernel and are better for your health. Limit refined grains, such as white bread and rice.    + Satisfy hunger with unsaturated fat. Fat helps you feel satisfied. Choose unsaturated fats, such as canola or olive oils, nuts and seeds, oil-based dressings and avocados. Limit saturated fats, which are found in animal products and some plant oils, such as coconut and palm oils.    +  Figure out what kind of calories you are taking in now at this time.  It is hard to change behaviors that you do not understand.      +  Keep your calorie intake at least less than 1400 per day to start (under 1200 total if you want to be more aggressive), divided between 3 smaller meals (try to have no meal more than 500 calories) and 2 snacks.      +  \"Learn what 500 calories looks like\" and try to keep all meals under 500 calories.      + Pay attention to portion sizes. Use smaller plates, bowls and glasses. Portion out foods before you eat.    +  Use the \"fork rule\" for all eating. [If you can't pick food up with a fork, then the food will not turn off hunger very well. Using this rule helps patients avoid choosing the wrong types of food, especially if you have had a bariatric surgery operation.   The \"wrong foods\" include liquid calories such as soup, juice, soda, slimfast, ice cream, and beer, crumbly foods such as chips, crackers, and cookies, and starch based foods such as pasta, too much rice, and too much bread.]     + Drink water or unsweetened beverages. Avoid soda, sweetened coffees and teas, energy drinks and sports drinks, which are full of added sugar that your body does not need. Water is always the best option.    +  Drink one large glass of water BEFORE each meal.  This not only helps guard against dehydration, but it also helps provide additional \"fullness\" that will help " "reduce the amount of food that you will consume in a given meal by helping you fill up earlier.      + Eat mindfully. Take time to fully enjoy your food and pay attention to what you are eating. Make meals last 15 to 30 minutes. This gives your body a chance to become satisfied and tell your brain to stop eating. Pay attention to what you are eating, rather than doing other activities such as watching TV or driving. This helps you pay attention to your body s signals of hunger and fullness.    + Share meals when eating at restaurants, or put half of the entrée in a to-go container before you start eating.    +  Try to eat breakfast every day.  Skipping meals has been shown to be one of the factors that correlates the highest with obesity, (even more than fast food).  Try to avoid the typical carbohydrates and sugars that dominate the typical American breakfast.  Try to get more protein in earlier in the day, this will make you less hungry later.       +  Try High Protein Ensure or Boost nutritional supplements (or similar versions) for breakfast if nothing else.      +  I NEVER recommend over the counter diet aids such as Metabolife or Dexatrim since they have a high risk of side effects mainly fast heart rate, insomnia, and nervousness.      Good resources for nutrition are:         --Good Grains:  Oats, brown rice, Quinoa (these do not raise the blood sugar as much)     --Bad grains:  Anything made from wheat or white rice     (because these raise the blood sugars significantly, and the possible gluten issue from wheat for some people).      --Proteins:  Aim for \"lean proteins\" including chicken, fish, seafood, pork, turkey, and eggs (in moderation); Eat red meat only occasionally      ---> \"Wheat Belly\" by Behzad Huynh  (a book about the many bad things processed wheat products (i.e. Bread) have caused in our country...which I believe has serious merit)       --->  \"The Paleo Diet\"  By Sylvia Slaughter.       " "      --->\"In Defense of Food\"  Of \"Food Rules\" (Julio Cesar Monroy) a book that goes into the causes obesity epidemic in our country    Jayy Eliana:                    ---> \"Picture Perfect Weight Loss\" by Jon Altamirano (another good book about choices)        ---> \"Eat This, Not That\" Series,  by Octavio Albarado  (a book about food choices in the modern world)              ---> \"The Blood Sugar Solution\" by Mundo Mckeon ( a book about obesity and insulin resistance leading to \"diabesity\")           Calorie Naun ( a guidebook for counting calories, and knowing the components of the food that you eat)       \"The Best Life Diet\"  (a complete diet program)             Aim for a Healthy Weight Web Page at www.nhlbi.nih.gov       [place] Diet       Mikey Uribe:  \"Eat More, Weigh Less\" or \"The Program for the Reversal of Heart Disease\"       [facility] web site  the food and nutrition link.       American Heart Association       American Diabetes Association (www.diabetes.org)              Eat Better ? Move More ? Live Well     Eat 3 nutrient-rich meals each day     Don t skip meals--it will cause you to overeat later in the day!     Eating fiber (vegetables/fruits/whole grains) and protein with meals helps you stay full longer     Choose foods with less than 10 grams of sugar and 5 grams of fat per serving to prevent excess calories and weight re-gain   Eat around the same times each day to develop a routine eating schedule    Avoid snacking unless physically hungry.   Planned snacks: 1-2 times per day and no more than 150 calories    Eat protein first    Protein helps with healing, maintaining adequate muscle mass, reducing hunger and optimizing nutritional status    Aim for 60-80 grams of protein per day   Fill up on Fiber    Fiber comes from plants--fruits, veggies, whole grains, nuts/seeds and beans    Fiber is low in calories, high in phytonutrients and helps you stay full longer    Aim for 25-35 grams per day " by eating fiber with meals and snacks  Eat [id number]    Take 20-30 minutes to eat each meal by taking small bites, chewing foods to applesauce consistency or 20-30 times before you swallow    Eating foods too fast can delay satiety/fullness signals and increase overeating     Slow down your eating by using toddler utensils, putting your fork/spoon down between bites and not watching TV or emailing during meals!   Keep a Journal          Writing down what you eat, how you feel and when you are active helps you identify new changes to work on from week to week          Look for ways to cut 100 calories from your current diet 2-3 times per day  Drink 64 ounces of 0-Calorie drinks between meals    Water    Zero calorie Propel  or Vitamin Water      [employer]  Zero Calories    Crystal Light , Sugar-Free Stanley-Aid , and other sugar-free lemonade or flavored weiss    Keep Caffeine to less than 300mg per day ie: 3-6oz cups coffee      Work up to 45-60 minutes of physical activity most days of the week    Helps with losing weight and prevent regaining those extra pounds!     Do a combo of cardio (walking/water exercises) and strength training (lifting weights/Vinyasa yoga)     Avoid Mindless Eating    Be present when you eat--take note of the smell, taste and quality of your food    Make a list of alternative activities you could do to prevent eating out of boredom/stress    Go for a walk, call a friend, chew gum, paint your nails, re-organize the garage, etc

## 2020-07-10 NOTE — NURSING NOTE
"Chief Complaint   Patient presents with     Medicare Visit     BP (!) 152/80   Pulse 100   Temp 97.6  F (36.4  C) (Temporal)   Ht 1.562 m (5' 1.5\")   Wt 100.2 kg (220 lb 12.8 oz)   SpO2 96%   BMI 41.04 kg/m   Estimated body mass index is 41.04 kg/m  as calculated from the following:    Height as of this encounter: 1.562 m (5' 1.5\").    Weight as of this encounter: 100.2 kg (220 lb 12.8 oz).        Health Maintenance due pending provider review:  ACT    completed    Marely Koch CMA  "

## 2020-07-10 NOTE — PROGRESS NOTES
"SUBJECTIVE:   Marilia Palma is a 75 year old female who presents for Preventive Visit.    **Patient is deaf, the visit was completed in the presence of a .  Are you in the first 12 months of your Medicare coverage?  No    Healthy Habits:    In general, how would you rate your overall health?  Good    Frequency of exercise:  None    Duration of exercise:  Less than 15 minutes    Do you usually eat at least 4 servings of fruit and vegetables a day, include whole grains    & fiber and avoid regularly eating high fat or \"junk\" foods?  Yes    Taking medications regularly:  Yes    Barriers to taking medications:  None    Medication side effects:  None    Ability to successfully perform activities of daily living:  No assistance needed    Home Safety:  Lack of handrails on stairs    In the past 6 months, have you been bothered by leaking of urine?  No    In general, how would you rate your overall mental or emotional health?  Good      PHQ-2 Total Score:    Additional concerns today:  No    Do you feel safe in your environment? YES    Have you ever done Advance Care Planning? (For example, a Health Directive, POLST, or a discussion with a medical provider or your loved ones about your wishes): No, advance care planning information given to patient to review.  Patient plans to discuss their wishes with loved ones or provider.        Fall risk  Fallen 2 or more times in the past year?: No  Any fall with injury in the past year?: No    Cognitive Screening   1) Repeat 3 items (Leader, Season, Table)    2) Clock draw: ABNORMAL hands slgithly croooked, otherwise numbes and face normal.   3) 3 item recall: Recalls 3 objects  Results: 3 items recalled: COGNITIVE IMPAIRMENT LESS LIKELY    Mini-CogTM Copyright TABITHA Singh. Licensed by the author for use in [facility]; reprinted with permission ([email or web address]@.[email or web address]). All rights reserved.      Do you have sleep apnea, excessive snoring or daytime " drowsiness?: no    Reviewed and updated as needed this visit by clinical staff  Tobacco  Allergies  Meds  Problems  Med Hx  Surg Hx  Fam Hx  Soc Hx          Reviewed and updated as needed this visit by Provider  Tobacco  Allergies  Meds  Problems  Med Hx  Surg Hx  Fam Hx        Social History     Tobacco Use     Smoking status: Never Smoker     Smokeless tobacco: Never Used   Substance Use Topics     Alcohol use: No     If you drink alcohol do you typically have >3 drinks per day or >7 drinks per week? No    No flowsheet data found.          1.    Hypertension:  Blood presure remains well controlled at home  Readings outside clinic are within normal limits.  Reviewed last 6 BP readings in chart:  BP Readings from Last 6 Encounters:   07/10/20 136/76   05/18/20 (!) 156/81   01/08/20 112/72   09/19/19 126/86   06/19/19 120/90   06/12/19 110/78     He has not experienced any significant side effects from medicaiotns for hypertension.    NO active cardiac complaints or symptoms with exercise.    2.  Hyperlipidemia:  Has history of hyperlipidemia.    The patient is taking a medication for this.  Denies any significant side effects from his medication.      Latest labs reviewed:    Recent Labs   Lab Test 07/07/20  0913 09/10/19  0847  10/02/15  0758 07/07/15  0803   CHOL 177 152   < > 273* 215*   HDL 50 48*   < > 50* 48*   LDL 91 73   < > 183* 131*   TRIG 178* 155*   < > 200* 178*   CHOLHDLRATIO  --   --   --  5.5* 4.5    < > = values in this interval not displayed.        Lab Results   Component Value Date    AST 19 07/07/2020         3.    Asthma stable.  Has not had any recent breathing troubles beyond usual baseline.  Has not any acute respiratory events.  Remains with intermittent cough, mild shortness of breath with overexertion as per usual.  Using medication as directed with reported side effects    ACT Total Scores 7/10/2020   ACT TOTAL SCORE (Goal Greater than or Equal to 20) 21   In the past 12  months, how many times did you visit the emergency room for your asthma without being admitted to the hospital? 0   In the past 12 months, how many times were you hospitalized overnight because of your asthma? 0        4.    The patient has had a history of ongoing obesity.  Reviewed the weigth curves.   Their current BMI is:  Body mass index is 41.04 kg/m .  Reviewed previous attempts at weight loss which have not been successful in producing prolonged weigth loss.   Discussed current eating and exercise habits.     Reviewed weights in chart:  Wt Readings from Last 10 Encounters:   07/10/20 100.2 kg (220 lb 12.8 oz)   05/18/20 96.6 kg (213 lb)   01/08/20 96.6 kg (213 lb)   09/19/19 97.8 kg (215 lb 8 oz)   06/19/19 96.6 kg (213 lb)   06/12/19 99.8 kg (220 lb)   06/03/19 97.1 kg (214 lb)   05/07/19 97.1 kg (214 lb)   03/27/18 90.3 kg (199 lb)   02/20/18 91.5 kg (201 lb 12.8 oz)        Current providers sharing in care for this patient include: \  Patient Care Team:  Chris Bhatia MD as PCP - General (Internal Medicine)  Chris Bhatia MD as Assigned PCP    The following health maintenance items are reviewed in Epic and correct as of today:  Health Maintenance   Topic Date Due     DEXA  1945     ASTHMA ACTION PLAN  1945     ASTHMA CONTROL TEST  1945     ADVANCE CARE PLANNING  12/03/2017     PHQ-2  01/01/2020     MEDICARE ANNUAL WELLNESS VISIT  05/07/2020     FALL RISK ASSESSMENT  05/07/2020     INFLUENZA VACCINE (1) 09/01/2020     DTAP/TDAP/TD IMMUNIZATION (2 - Td) 12/03/2022     LIPID  07/07/2025     COLORECTAL CANCER SCREENING  03/27/2028     PNEUMOCOCCAL IMMUNIZATION 65+ LOW/MEDIUM RISK  Completed     ZOSTER IMMUNIZATION  Completed     IPV IMMUNIZATION  Aged Out     MENINGITIS IMMUNIZATION  Aged Out       Past Medical History:  ---------------------------  Past Medical History:   Diagnosis Date     Deaf 1945     H. pylori infection 03/27/2018    H. Pylori identified on EGD  biopsy.  completed 10 day triple therapy.       HTN (hypertension) 2012     Hyperlipidemia LDL goal < 130 11/28/12     pre-treatment     Obesity (BMI 30-39.9) 12/3/12    BMI 37     Prediabetes 07/07/2020    A1C 5.9       Past Surgical History:  ---------------------------  Past Surgical History:   Procedure Laterality Date     ESOPHAGOSCOPY, GASTROSCOPY, DUODENOSCOPY (EGD), COMBINED N/A 3/27/2018    Procedure: COMBINED ESOPHAGOSCOPY, GASTROSCOPY, DUODENOSCOPY (EGD), BIOPSY SINGLE OR MULTIPLE;;  Surgeon: George Miles MD;  Location: [facility]       Current Medications:  ---------------------------  Current Outpatient Medications   Medication Sig Dispense Refill     albuterol (PROAIR HFA/PROVENTIL HFA/VENTOLIN HFA) 108 (90 Base) MCG/ACT inhaler Inhale 2 puffs into the lungs every 4 hours as needed for shortness of breath / dyspnea or wheezing 18 g 5     albuterol (PROVENTIL) (2.5 MG/3ML) 0.083% neb solution Take 2 vials (5 mg) by nebulization every 4 hours as needed for shortness of breath / dyspnea or wheezing 75 mL 0     ASPIRIN NOT PRESCRIBED (INTENTIONAL) Please choose reason not prescribed, below       atorvastatin (LIPITOR) 20 MG tablet Take 1 tablet (20 mg) by mouth daily 90 tablet 3     fluticasone (FLOVENT HFA) 110 MCG/ACT inhaler Inhale 1 puff into the lungs 2 times daily 3 Inhaler 3     lisinopril-hydrochlorothiazide (ZESTORETIC) 20-12.5 MG tablet Take 2 tablets by mouth every morning 180 tablet 3     montelukast (SINGULAIR) 10 MG tablet Take 1 tablet (10 mg) by mouth daily 90 tablet 3     VITAMIN D3 1000 units tablet Take 1,000 tablets by mouth daily  99       Allergies:  -------------  No Known Allergies    Social History:  -------------------  Social History     Socioeconomic History     Marital status:      Spouse name: Not on file     Number of children: Not on file     Years of education: Not on file     Highest education level: Not on file   Occupational History     Not on file   Social  "Needs     Financial resource strain: Not on file     Food insecurity     Worry: Not on file     Inability: Not on file     Transportation needs     Medical: Not on file     Non-medical: Not on file   Tobacco Use     Smoking status: Never Smoker     Smokeless tobacco: Never Used   Substance and Sexual Activity     Alcohol use: No     Drug use: No     Sexual activity: Never   Lifestyle     Physical activity     Days per week: Not on file     Minutes per session: Not on file     Stress: Not on file   Relationships     Social connections     Talks on phone: Not on file     Gets together: Not on file     Attends Mosque service: Not on file     Active member of club or organization: Not on file     Attends meetings of clubs or organizations: Not on file     Relationship status: Not on file     Intimate partner violence     Fear of current or ex partner: Not on file     Emotionally abused: Not on file     Physically abused: Not on file     Forced sexual activity: Not on file   Other Topics Concern     Parent/sibling w/ CABG, MI or angioplasty before 65F 55M? Not Asked   Social History Narrative     Not on file       Family Medical History:  ------------------------------  Family History   Problem Relation Age of Onset     Family History Negative Mother          natural causes age 90     Cerebrovascular Disease Father 65         after atroke     Hypertension Father         Review of Systems  Constitutional, HEENT, cardiovascular, pulmonary, gi and gu systems are negative, except as otherwise noted.    OBJECTIVE:   BP (!) 152/80   Pulse 100   Temp 97.6  F (36.4  C) (Temporal)   Ht 1.562 m (5' 1.5\")   Wt 100.2 kg (220 lb 12.8 oz)   SpO2 96%   BMI 41.04 kg/m   Estimated body mass index is 41.04 kg/m  as calculated from the following:    Height as of this encounter: 1.562 m (5' 1.5\").    Weight as of this encounter: 100.2 kg (220 lb 12.8 oz).  Physical Exam  GENERAL alert and no distress  EYES:  Normal " sclera,conjunctiva, EOMI  HENT: oral and posterior pharynx without lesions or erythema, facies symmetric  NECK: Neck supple. No LAD, without thyroidmegaly.  RESP: Clear to ausculation bilaterally without wheezes or crackles. Normal BS in all fields.  CV: RRR normal S1S2 without murmurs, rubs or gallops.  LYMPH: no cervical lymph adenopathy appreciated  MS: extremities- no gross deformities of the visible extremities noted,   EXT:  no lower extremity edema  PSYCH: Alert and oriented times 3; speech- coherent  SKIN:  No obvious significant skin lesions on visible portions of face     Diagnostic Test Results:  Labs reviewed in Epic    ASSESSMENT / PLAN:     (Z00.00) Medicare annual wellness visit, subsequent  (primary encounter diagnosis)  Comment: Discussed cardiac disease risk factors and cardiac disease risk factor modification, including diabetes screening, blood pressure screening (and management if indicated), and cholesterol screening.   Reviewed immunzation guidelines, including pneumococcal vaccines, annual influenza, and shingles vaccines.   Discussed routine cancer screenings, including skin cancer, colon cancer screening for everyone until age 80, prostate cancer screening in men until age 75, mammogram and PAP/pelvic for women until age 75.   Recommended regular dentist visits to care for remaining teeth.   Recommended regular screening for vision and glaucoma.   Recommended safe driving and accident avoidance.   Plan:     (E66.01,  Z68.41) Morbid obesity with BMI of 40.0-44.9, adult (H)  Comment: The patient's current BMI is: Body mass index is 41.04 kg/m ..  Reviewed their weight patterns.   Discussed obesity as a biological preventable and treatable disease, which is associated with significantly increased risk of many acute and chronic health conditions. Obesity has now been recognized as a chronic disease by the American Medical Association.  Discussed serious co-morbidities associated with obesity  including increased risk for hypertension, stroke, coronary artery disease, dyslipidemia, Type II diabetes, depression, sleep apnea, cancers of the colon, breast and endometrium, obstructive sleep apnea, osteoarthritis and female infertility.  Recommended regular aerobic exercise, recommended improved diet aiming at lowering amount of processed foods, lower sugars, and lower wheat products, and moderation carbs and fat in the diet, establishing more regular meal times, always eating breakfast, front loading some of the calories and adding more protein to the diet.     Briefly discussed bariatric surgery as a consideration, especially in patients with BMI greater than or equal to 35 kg/m2 with multiple complications.     Plan: Lipid panel reflex to direct LDL Fasting, CBC         with platelets, Comprehensive metabolic panel,         Hemoglobin A1c            (I10) Essential hypertension, benign  Comment: This condition is currently controlled on the current medical regimen.  Continue current therapy.   Discussed current hypertension treatment guidelines, including indications for treatment and treatment options.  Discussed the importance for aggressive management of HTN to prevent vascular complications later.  Recommended lower fat, lower carbohydrate, and lower sodium (<2000 mg)diet.  Discussed required intervals for follow up on HTN, lab studies.  Recommened pt. follow their blood pressures outside the clinic to ensure that BPs are remaining within guidelines, and to contact me if the readings are not within guidelines on a regular basis so we can adjust treatment as needed.   Plan: lisinopril-hydrochlorothiazide (ZESTORETIC)         20-12.5 MG tablet, CBC with platelets,         Comprehensive metabolic panel            (E78.5) Hyperlipidemia with target LDL less than 130  Comment: This condition is currently controlled on the current medical regimen.  Continue current therapy.   Discussed current lipid results,  "previous results (if available) current guidelines (NCEP) for treatment and goals for lipids.  Discussed lifestyle modification, dietary changes (low fat, low simple carb) and regular aerobic exercise.  Discussed the link between dysmetabolic syndrome and impaired glucose tolerance seen in certain patterns of lipids.  Briefly discussed medication used for lipid lowering, including the statins are their possible side effects of myalgias, rhabdomyolysis, and liver toxicity.   Plan: atorvastatin (LIPITOR) 20 MG tablet            (R05) Cough  Comment: From his mild intermittent asthma  This condition is currently controlled on the current medical regimen.  Continue current therapy.   Plan: montelukast (SINGULAIR) 10 MG tablet, albuterol        (PROAIR HFA/PROVENTIL HFA/VENTOLIN HFA) 108 (90        Base) MCG/ACT inhaler            (J45.30) Mild persistent asthma without complication  Comment: This condition is currently controlled on the current medical regimen.  Continue current therapy.   Plan:     (R73.03) Prediabetes  Comment: Reviewed the labs showing elevated glucose levels.    Discussed \"pre-diabetes\", impaired glucose tolerance, and its part in the dysmetabolic syndrome.    Discussed the inevitable progression of impaired glucose tolerance toward worsening diabetes mellitus and the need for agressive interventions now to delay and prevent this inevitable progression.  Discussed the overall risks that dysmetabolic syndromes/impaired glucose tolerance/syndrome X pose toward increased risks of vascular disease as the main reason for agressive intervention now.  Will add medications for glucose control (i.e. metformin, glitazones, etc), lipid (e.g. statins), and for blood pressure (preferably ARBs and ACE) as indicated.  Will start these as early as needed based other proven ability to delay and modify these risk factors.   Discussed the need for aggressive diet control as the cornerstone of pre-diabetes and diabetes " "management, emphasizing the reduction of \"simple carbohydrates\" (e.g. Any kind of wheat products (e.g. any bread, any pasta), white rice, noodles, potatoes, snack foods, regular soda, juices (except fresh squeezed), cakes, cookies, candy, etc.) along with regular exercise.       Plan: Lipid panel reflex to direct LDL Fasting,         Comprehensive metabolic panel, Hemoglobin A1c               COUNSELING:  Reviewed preventive health counseling, as reflected in patient instructions       Regular exercise       Healthy diet/nutrition       Vision screening       Hearing screening       Dental care       Bladder control       Fall risk prevention    Estimated body mass index is 41.04 kg/m  as calculated from the following:    Height as of this encounter: 1.562 m (5' 1.5\").    Weight as of this encounter: 100.2 kg (220 lb 12.8 oz).         reports that she has never smoked. She has never used smokeless tobacco.      Appropriate preventive services were discussed with this patient, including applicable screening as appropriate for cardiovascular disease, diabetes, osteopenia/osteoporosis, and glaucoma.  As appropriate for age/gender, discussed screening for colorectal cancer, prostate cancer, breast cancer, and cervical cancer. Checklist reviewing preventive services available has been given to the patient.    Reviewed patients plan of care and provided an AVS. The  valente Capellan meets the Care Plan requirement. This Care Plan has been established and reviewed with the .    Counseling Resources:  ATP IV Guidelines  Pooled Cohorts Equation Calculator  Breast Cancer Risk Calculator  FRAX Risk Assessment  ICSI Preventive Guidelines  Dietary Guidelines for Americans, 2010  USDA's MyPlate  ASA Prophylaxis  Lung CA Screening    Chris Bhatia MD  [facility]    Identified Health Risks:  "

## 2020-07-11 ASSESSMENT — ASTHMA QUESTIONNAIRES: ACT_TOTALSCORE: 21

## 2020-07-29 ENCOUNTER — ANCILLARY PROCEDURE (OUTPATIENT)
Dept: GENERAL RADIOLOGY | Facility: CLINIC | Age: 75
End: 2020-07-29
Attending: PHYSICIAN ASSISTANT
Payer: COMMERCIAL

## 2020-07-29 ENCOUNTER — OFFICE VISIT (OUTPATIENT)
Dept: URGENT CARE | Facility: URGENT CARE | Age: 75
End: 2020-07-29
Payer: COMMERCIAL

## 2020-07-29 VITALS
WEIGHT: 220 LBS | DIASTOLIC BLOOD PRESSURE: 60 MMHG | BODY MASS INDEX: 40.9 KG/M2 | TEMPERATURE: 97 F | OXYGEN SATURATION: 97 % | SYSTOLIC BLOOD PRESSURE: 115 MMHG | RESPIRATION RATE: 16 BRPM | HEART RATE: 97 BPM

## 2020-07-29 DIAGNOSIS — S89.92XA KNEE INJURY, LEFT, INITIAL ENCOUNTER: Primary | ICD-10-CM

## 2020-07-29 DIAGNOSIS — S89.92XA KNEE INJURY, LEFT, INITIAL ENCOUNTER: ICD-10-CM

## 2020-07-29 DIAGNOSIS — M25.562 ACUTE PAIN OF LEFT KNEE: ICD-10-CM

## 2020-07-29 DIAGNOSIS — M23.92 INTERNAL DERANGEMENT OF LEFT KNEE: ICD-10-CM

## 2020-07-29 PROCEDURE — 99214 OFFICE O/P EST MOD 30 MIN: CPT | Performed by: PHYSICIAN ASSISTANT

## 2020-07-29 PROCEDURE — 73562 X-RAY EXAM OF KNEE 3: CPT | Mod: LT

## 2020-07-29 RX ORDER — HYDROCODONE BITARTRATE AND ACETAMINOPHEN 5; 325 MG/1; MG/1
1 TABLET ORAL EVERY 6 HOURS PRN
Qty: 18 TABLET | Refills: 0 | Status: SHIPPED | OUTPATIENT
Start: 2020-07-29 | End: 2020-08-01

## 2020-08-04 NOTE — PROGRESS NOTES
SUBJECTIVE:  Chief Complaint   Patient presents with     Musculoskeletal Problem     left knee x3 -4 weeks, no injuries, was in a hurry to put on shoes and must have twisted it, felt a click intense pain, little limping, touch it it tingled, pain level right now is a 5/10     Verlaine M Louie is a 75 year old female presents with a chief complaint of left knee pain, swelling and tenderness.  The injury occurred 3 week(s) ago.   The injury happened while at home. How: twistedimmediate pain.  The patient complained of moderate pain  and has had decreased ROM.  Pain exacerbated by walking and weight-bearing.  Relieved by rest.  She treated it initially with ice. This is not the first time this type of injury has occurred to this patient.     Past Medical History:   Diagnosis Date     1945     H. pylori infection 2018    H. Pylori identified on EGD biopsy.  completed 10 day triple therapy.       HTN (hypertension)      Hyperlipidemia LDL goal < 130 12     pre-treatment     Obesity (BMI 30-39.9) 12/3/12    BMI 37     Prediabetes 2020    A1C 5.9     ALLERGIES  No Known Allergies     Family History   Problem Relation Age of Onset     Family History Negative Mother          natural causes age 90     Cerebrovascular Disease Father 65         after atroke     Hypertension Father        Social History     Tobacco Use     Smoking status: Never Smoker     Smokeless tobacco: Never Used   Substance Use Topics     Alcohol use: No       ROS:  CONSTITUTIONAL:NEGATIVE for fever, chills, change in weight  INTEGUMENTARY/SKIN: NEGATIVE for worrisome rashes, moles or lesions  ENT/MOUTH: NEGATIVE for ear, mouth and throat problems  RESP:NEGATIVE for significant cough or SOB  CV: NEGATIVE for chest pain, palpitations or peripheral edema  GI: NEGATIVE for nausea, abdominal pain, heartburn, or change in bowel habits  MUSCULOSKELETAL: POSITIVE  for left knee pain  VASC: NEGATIVE for cool  extremity  NEURO: NEGATIVE for weakness, dizziness or paresthesias    EXAM:   /60 (BP Location: Left arm, Patient Position: Chair, Cuff Size: Adult Large)   Pulse 97   Temp 97  F (36.1  C) (Oral)   Resp 16   Wt 99.8 kg (220 lb)   SpO2 97%   BMI 40.90 kg/m    Gen: healthy,alert,no distress  Extremity: knee has point tenderness  and decreased ROM .   There is not compromise to the distal circulation.  Pulses are +2 and CRT is brisk  EXTREMITIES: peripheral pulses normal  MS:  decreased range of motion  and tender to palpation   SKIN: no suspicious lesions or rashes  NEURO: Normal strength and tone, sensory exam grossly normal, mentation intact and speech normal    X-RAY was done and negative for acute changes including fracture Xray read by Kd Cardoso PA-C at time of visit    ASSESSMENT/PLAN      ICD-10-CM    1. Knee injury, left, initial encounter  S89.92XA XR Knee Left 3 Views     HYDROcodone-acetaminophen (NORCO) 5-325 MG tablet     order for DME   2. Acute pain of left knee  M25.562 XR Knee Left 3 Views     HYDROcodone-acetaminophen (NORCO) 5-325 MG tablet     order for DME   3. Internal derangement of left knee  M23.92 HYDROcodone-acetaminophen (NORCO) 5-325 MG tablet     order for DME     ORTHOPEDICS PEDS REFERRAL       Patient has internal derangement, may need MRI  Referral to ortho  vicodin for pain  Knee brace

## 2020-08-28 ENCOUNTER — OFFICE VISIT (OUTPATIENT)
Dept: URGENT CARE | Facility: URGENT CARE | Age: 75
End: 2020-08-28
Payer: COMMERCIAL

## 2020-08-28 ENCOUNTER — ANCILLARY PROCEDURE (OUTPATIENT)
Dept: GENERAL RADIOLOGY | Facility: CLINIC | Age: 75
End: 2020-08-28
Attending: FAMILY MEDICINE
Payer: COMMERCIAL

## 2020-08-28 VITALS
SYSTOLIC BLOOD PRESSURE: 122 MMHG | RESPIRATION RATE: 18 BRPM | DIASTOLIC BLOOD PRESSURE: 66 MMHG | BODY MASS INDEX: 40.6 KG/M2 | HEART RATE: 103 BPM | WEIGHT: 218.4 LBS | OXYGEN SATURATION: 97 % | TEMPERATURE: 99 F

## 2020-08-28 DIAGNOSIS — S99.921A FOOT INJURY, RIGHT, INITIAL ENCOUNTER: ICD-10-CM

## 2020-08-28 DIAGNOSIS — M79.671 RIGHT FOOT PAIN: Primary | ICD-10-CM

## 2020-08-28 PROCEDURE — 99214 OFFICE O/P EST MOD 30 MIN: CPT | Performed by: FAMILY MEDICINE

## 2020-08-28 PROCEDURE — 73630 X-RAY EXAM OF FOOT: CPT | Mod: RT

## 2020-08-28 NOTE — PROGRESS NOTES
Chief Complaint   Patient presents with     Leg Swelling     Swollen and hard right foot and it feels warm to the touch x 1.5 weeks     SUBJECTIVE:  Chief Complaint   Patient presents with     Leg Swelling     Swollen and hard right foot and it feels warm to the touch x 1.5 weeks     Marilia Palma is a 75 year old pleasant female presents with a chief complaint of right foot pain, swelling and tenderness.  The injury occurred 7 day(s) ago.   The injury happened while at home. How: trauma: which happened when she was walking out of the bathroom and hit her foot delayed pain.  The patient complained of moderate pain  and has had decreased ROM.  Pain exacerbated by walking, weight-bearing and repetitive motion.  Relieved by rest.  She treated it initially with Ibuprofen. This is the first time this type of injury has occurred to this patient.   The entire visit was done with the help of an      Past Medical History:   Diagnosis Date     Deaf 1945     H. pylori infection 03/27/2018    H. Pylori identified on EGD biopsy.  completed 10 day triple therapy.       HTN (hypertension) 2012     Hyperlipidemia LDL goal < 130 11/28/12     pre-treatment     Obesity (BMI 30-39.9) 12/3/12    BMI 37     Prediabetes 07/07/2020    A1C 5.9     Current Outpatient Medications   Medication Sig Dispense Refill     albuterol (PROAIR HFA/PROVENTIL HFA/VENTOLIN HFA) 108 (90 Base) MCG/ACT inhaler Inhale 2 puffs into the lungs every 4 hours as needed for shortness of breath / dyspnea or wheezing 18 g 5     albuterol (PROVENTIL) (2.5 MG/3ML) 0.083% neb solution Take 2 vials (5 mg) by nebulization every 4 hours as needed for shortness of breath / dyspnea or wheezing 75 mL 0     ASPIRIN NOT PRESCRIBED (INTENTIONAL) Please choose reason not prescribed, below       atorvastatin (LIPITOR) 20 MG tablet Take 1 tablet (20 mg) by mouth daily 90 tablet 3     fluticasone (FLOVENT HFA) 110 MCG/ACT inhaler Inhale 1 puff into the lungs 2  times daily 3 Inhaler 3     lisinopril-hydrochlorothiazide (ZESTORETIC) 20-12.5 MG tablet Take 2 tablets by mouth every morning 180 tablet 3     montelukast (SINGULAIR) 10 MG tablet Take 1 tablet (10 mg) by mouth daily 90 tablet 3     order for DME Left knee brace 1 Device 0     VITAMIN D3 1000 units tablet Take 1,000 tablets by mouth daily  99     Social History     Tobacco Use     Smoking status: Never Smoker     Smokeless tobacco: Never Used   Substance Use Topics     Alcohol use: No       ROS:  10 point ROS of systems including Constitutional, Eyes, Respiratory, Cardiovascular, Gastroenterology, Genitourinary, Integumentary, Psychiatric were all negative except for pertinent positives noted in my HPI           EXAM:   /66   Pulse 103   Temp 99  F (37.2  C) (Tympanic)   Resp 18   Wt 99.1 kg (218 lb 6.4 oz)   SpO2 97%   BMI 40.60 kg/m    Gen: healthy,alert,no distress  Extremity: rt foot has swelling and point tenderness over the 2nd and 3rd toe and metatarsal bones   There is not compromise to the distal circulation.  Pulses are +2 and CRT is brisk  GENERAL APPEARANCE: healthy, alert and no distress  EXTREMITIES: peripheral pulses normal  SKIN: no suspicious lesions or rashes  NEURO: Normal strength and tone, sensory exam grossly normal, mentation intact and speech normal    X-RAY was done.  My interpretation did not show any fracture  Differential diagnosis foot contusion/foot fracture/tendon injury/  ASSESSMENT:   Marilia was seen today for leg swelling.    Diagnoses and all orders for this visit:    Right foot pain  -     XR Foot Right G/E 3 Views    Foot injury, right, initial encounter  -     Ankle/Foot Bracing Supplies Order for DME - ONLY FOR DME        PLAN:  1) Rest, Ice, Compress, Elevate, Ibuprofen q 6 hrs for 3-5 days and Ortho referral  Discussed with patient in details of the x-ray finding  Advised patient to wear the postop shoe to help with the pain advised to continue wearing the  postop shoe for next 3 weeks can do Tylenol for the pain alternated with ibuprofen but should not be doing ibuprofen more than 3 days.  Patient understood and agreed with plan advised to apply ice to area and also elevate the foot to help with the pain and the swelling.  Patient understood and agreed with plan  Follow up if  symptoms fail to improve or worsens   Pt understood and agreed with plan       Serina Juarez MD

## 2020-08-28 NOTE — PATIENT INSTRUCTIONS
Patient Education     Foot Contusion  You have a contusion. This is also called a bruise. There is swelling and some bleeding under the skin, but no broken bones. This injury generally takes a few days to a few weeks to heal.  During that time, the bruise will typically change in color from reddish, to purple-blue, to greenish-yellow, then to yellow-brown.  Home care    Elevate the foot to reduce pain and swelling. As much as possible, sit or lie down with the foot raised about the level of your heart. This is especially important during the first 48 hours.    Ice the foot to help reduce pain and swelling. Wrap a cold source (ice pack or ice cubes in a plastic bag) in a thin towel. Apply to the bruised area for 20 minutes every 1 to 2 hours the first day. Continue this 3 to 4 times a day until the pain and swelling goes away.    Unless another medicine was prescribed, you can take acetaminophen, ibuprofen, or naproxen to control pain. (If you have chronic liver or kidney disease or ever had a stomach ulcer or gastrointestinal bleeding, talk with your healthcare provider before using these medicines.)  Follow up  Follow up with your healthcare provider or our staff as advised. Call if you are not improving within 1 to 2 weeks.  When to seek medical advice   Call your healthcare provider right away if you have any of the following:    Increased pain or swelling    Foot or leg becomes cold, blue, numb or tingly    Signs of infection: Warmth, drainage, or increased redness or pain around the bruise    Inability to move the injured foot     Frequent bruising for unknown reasons  Date Last Reviewed: 2/1/2017 2000-2019 [employer]. [address] [place] [address], [place] [address]. All rights reserved. This information is not intended as a substitute for professional medical care. Always follow your healthcare professional's instructions.

## 2020-10-09 ENCOUNTER — OFFICE VISIT (OUTPATIENT)
Dept: URGENT CARE | Facility: URGENT CARE | Age: 75
End: 2020-10-09
Payer: COMMERCIAL

## 2020-10-09 VITALS
OXYGEN SATURATION: 96 % | DIASTOLIC BLOOD PRESSURE: 70 MMHG | WEIGHT: 218 LBS | HEART RATE: 126 BPM | RESPIRATION RATE: 16 BRPM | BODY MASS INDEX: 40.52 KG/M2 | SYSTOLIC BLOOD PRESSURE: 132 MMHG | TEMPERATURE: 98.2 F

## 2020-10-09 DIAGNOSIS — R30.0 DYSURIA: Primary | ICD-10-CM

## 2020-10-09 DIAGNOSIS — L02.92 BOIL: ICD-10-CM

## 2020-10-09 LAB
ALBUMIN UR-MCNC: NEGATIVE MG/DL
APPEARANCE UR: CLEAR
BACTERIA #/AREA URNS HPF: ABNORMAL /HPF
BILIRUB UR QL STRIP: NEGATIVE
COLOR UR AUTO: YELLOW
GLUCOSE UR STRIP-MCNC: NEGATIVE MG/DL
HGB UR QL STRIP: ABNORMAL
KETONES UR STRIP-MCNC: NEGATIVE MG/DL
LEUKOCYTE ESTERASE UR QL STRIP: NEGATIVE
NITRATE UR QL: NEGATIVE
PH UR STRIP: 5.5 PH (ref 5–7)
RBC #/AREA URNS AUTO: ABNORMAL /HPF
SOURCE: ABNORMAL
SP GR UR STRIP: 1.02 (ref 1–1.03)
UROBILINOGEN UR STRIP-ACNC: 0.2 EU/DL (ref 0.2–1)
WBC #/AREA URNS AUTO: ABNORMAL /HPF

## 2020-10-09 PROCEDURE — 99214 OFFICE O/P EST MOD 30 MIN: CPT | Performed by: FAMILY MEDICINE

## 2020-10-09 PROCEDURE — 81001 URINALYSIS AUTO W/SCOPE: CPT | Performed by: FAMILY MEDICINE

## 2020-10-09 RX ORDER — CLINDAMYCIN HCL 300 MG
CAPSULE ORAL
COMMUNITY
Start: 2020-05-18 | End: 2021-11-10

## 2020-10-09 RX ORDER — MUPIROCIN 20 MG/G
OINTMENT TOPICAL 3 TIMES DAILY
Qty: 30 G | Refills: 1 | Status: SHIPPED | OUTPATIENT
Start: 2020-10-09 | End: 2022-03-02

## 2020-10-09 RX ORDER — CLINDAMYCIN HCL 300 MG
300 CAPSULE ORAL 3 TIMES DAILY
Qty: 30 CAPSULE | Refills: 0 | Status: SHIPPED | OUTPATIENT
Start: 2020-10-09 | End: 2021-11-10

## 2020-10-09 RX ORDER — MUPIROCIN 20 MG/G
OINTMENT TOPICAL
COMMUNITY
Start: 2020-05-18 | End: 2022-03-02

## 2020-10-09 NOTE — PROGRESS NOTES
SUBJECTIVE:   Marilia Palma is a 75 year old female presenting with a chief complaint of   Chief Complaint   Patient presents with     Mass     Pt has boil on buttock that she was seen for. Pt states it hurts when she sits down. Pt states she would like clindamycin refilled     UTI     Pt states she is urinating slowly    She comes in for recheck of the boil that she has.  She says it is improving and is wondering if she can have another refill of medication.  She had no side effects of the medication.  She has no fever no chills.  She does have some pain when she sits.  Subjective is limited because she is deaf does not use sign language and we only write down our conversation.    She is not been seen or by her primary care.    Noticed also some blood when she urinated this morning.  This on the tissue paper.  Not having any signs or symptoms of UTI other than the blood she noticed.    No flank pain no abdominal pain nausea vomiting    She is an established patient of [facility].        Review of Systems   Skin:        Boil on her buttocks same as previously when she was seen by Dr. Cardoso   All other systems reviewed and are negative.      Past Medical History:   Diagnosis Date     Deaf      H. pylori infection 2018    H. Pylori identified on EGD biopsy.  completed 10 day triple therapy.       HTN (hypertension)      Hyperlipidemia LDL goal < 130 12     pre-treatment     Obesity (BMI 30-39.9) 12/3/12    BMI 37     Prediabetes 2020    A1C 5.9     Family History   Problem Relation Age of Onset     Family History Negative Mother          natural causes age 90     Cerebrovascular Disease Father 65         after atroke     Hypertension Father      Current Outpatient Medications   Medication Sig Dispense Refill     albuterol (PROAIR HFA/PROVENTIL HFA/VENTOLIN HFA) 108 (90 Base) MCG/ACT inhaler Inhale 2 puffs into the lungs every 4 hours as needed for shortness of breath /  dyspnea or wheezing 18 g 5     albuterol (PROVENTIL) (2.5 MG/3ML) 0.083% neb solution Take 2 vials (5 mg) by nebulization every 4 hours as needed for shortness of breath / dyspnea or wheezing 75 mL 0     ASPIRIN NOT PRESCRIBED (INTENTIONAL) Please choose reason not prescribed, below       atorvastatin (LIPITOR) 20 MG tablet Take 1 tablet (20 mg) by mouth daily 90 tablet 3     clindamycin (CLEOCIN) 300 MG capsule Take 1 capsule (300 mg) by mouth 3 times daily 30 capsule 0     fluticasone (FLOVENT HFA) 110 MCG/ACT inhaler Inhale 1 puff into the lungs 2 times daily 3 Inhaler 3     lisinopril-hydrochlorothiazide (ZESTORETIC) 20-12.5 MG tablet Take 2 tablets by mouth every morning 180 tablet 3     montelukast (SINGULAIR) 10 MG tablet Take 1 tablet (10 mg) by mouth daily 90 tablet 3     mupirocin (BACTROBAN) 2 % external ointment Apply topically 3 times daily 30 g 1     order for DME Left knee brace 1 Device 0     VITAMIN D3 1000 units tablet Take 1,000 tablets by mouth daily  99     clindamycin (CLEOCIN) 300 MG capsule TAKE 1 CAPSULE BY MOUTH THREE TIMES DAILY FOR 10 DAYS       mupirocin (BACTROBAN) 2 % external ointment APPLY OINTMENT TOPICALLY THREE TIMES DAILY       Social History     Tobacco Use     Smoking status: Never Smoker     Smokeless tobacco: Never Used   Substance Use Topics     Alcohol use: No       OBJECTIVE  /70   Pulse 126   Temp 98.2  F (36.8  C) (Temporal)   Resp 16   Wt 98.9 kg (218 lb)   SpO2 96%   BMI 40.52 kg/m      Physical Exam  Vitals signs and nursing note reviewed.   HENT:      Head: Normocephalic.      Nose: Nose normal.   Eyes:      Pupils: Pupils are equal, round, and reactive to light.   Neck:      Musculoskeletal: Normal range of motion.   Cardiovascular:      Rate and Rhythm: Normal rate.      Pulses: Normal pulses.   Abdominal:      General: Abdomen is flat.   Musculoskeletal: Normal range of motion.   Skin:     General: Skin is warm.   Neurological:      General: No focal  deficit present.   Psychiatric:         Mood and Affect: Mood normal.         Labs:  Results for orders placed or performed in visit on 10/09/20 (from the past 24 hour(s))   UA with Microscopic reflex to Culture    Specimen: Midstream Urine   Result Value Ref Range    Color Urine Yellow     Appearance Urine Clear     Glucose Urine Negative NEG^Negative mg/dL    Bilirubin Urine Negative NEG^Negative    Ketones Urine Negative NEG^Negative mg/dL    Specific Gravity Urine 1.025 1.003 - 1.035    pH Urine 5.5 5.0 - 7.0 pH    Protein Albumin Urine Negative NEG^Negative mg/dL    Urobilinogen Urine 0.2 0.2 - 1.0 EU/dL    Nitrite Urine Negative NEG^Negative    Blood Urine Small (A) NEG^Negative    Leukocyte Esterase Urine Negative NEG^Negative    Source Midstream Urine     WBC Urine 0 - 5 OTO5^0 - 5 /HPF    RBC Urine O - 2 OTO2^O - 2 /HPF    Bacteria Urine Few (A) NEG^Negative /HPF       X-Ray was not done.    ASSESSMENT:      ICD-10-CM    1. Dysuria  R30.0 UA with Microscopic reflex to Culture   2. Boil  L02.92 clindamycin (CLEOCIN) 300 MG capsule     mupirocin (BACTROBAN) 2 % external ointment        Medical Decision Making:    Differential Diagnosis:  Cellulitis, boil, abscess, UTI    Serious Comorbid Conditions:  Adult:  None    PLAN:    1.  Cleocin  2.  Bactroban    Followup:    I did discuss with her if this is not improving with this medication refill she needs to be seen and we may have to open up the area to drain.    She was not letting us look at the lesion on her buttocks today.    There are no Patient Instructions on file for this visit.

## 2021-03-05 ENCOUNTER — OFFICE VISIT (OUTPATIENT)
Dept: INTERNAL MEDICINE | Facility: CLINIC | Age: 76
End: 2021-03-05
Payer: COMMERCIAL

## 2021-03-05 VITALS
SYSTOLIC BLOOD PRESSURE: 118 MMHG | HEART RATE: 104 BPM | OXYGEN SATURATION: 98 % | DIASTOLIC BLOOD PRESSURE: 66 MMHG | BODY MASS INDEX: 40.34 KG/M2 | WEIGHT: 217 LBS

## 2021-03-05 DIAGNOSIS — R25.2 MUSCLE CRAMP: ICD-10-CM

## 2021-03-05 DIAGNOSIS — R35.0 URINARY FREQUENCY: Primary | ICD-10-CM

## 2021-03-05 LAB

## 2021-03-05 PROCEDURE — 99213 OFFICE O/P EST LOW 20 MIN: CPT | Performed by: PHYSICIAN ASSISTANT

## 2021-03-05 PROCEDURE — 81001 URINALYSIS AUTO W/SCOPE: CPT | Performed by: PHYSICIAN ASSISTANT

## 2021-03-05 NOTE — PROGRESS NOTES
"    Assessment & Plan     Urinary frequency  No sign of infection   - UA with Microscopic reflex to Culture    Muscle cramp  One time this am.  Stay hydrated  Stretching  OTC if needed   Exam benign reassurance given                BMI:   Estimated body mass index is 40.34 kg/m  as calculated from the following:    Height as of 7/10/20: 1.562 m (5' 1.5\").    Weight as of this encounter: 98.4 kg (217 lb).   Weight management plan: Patient was referred to their PCP to discuss a diet and exercise plan.        Return in about 6 months (around 9/5/2021) for Routine Visit, regular primary provider.    SABRA Diaz [facility] is a 75 year old who presents for the following health issues  accompanied by her :    HPI       Musculoskeletal problem/pain  Onset/Duration: this morning  Description  Location: calf - left  Joint Swelling: YES  Redness: YES  Pain: YES- cramping  Warmth: no  Intensity:  moderate  Progression of Symptoms:  same  Accompanying signs and symptoms:   Fevers: no  Numbness/tingling/weakness: no  History  Trauma to the area: no  Recent illness:  no  Previous similar problem: YES- similar pains before but not this bad  Previous evaluation:  no  Precipitating or alleviating factors:  Aggravating factors include: none  Therapies tried and outcome: nothing    Genitourinary - Female  Onset/Duration: last 2 months  Description:   Painful urination (Dysuria): no           Frequency: YES  Blood in urine (Hematuria): no  Delay in urine (Hesitency): no  Intensity: moderate  Progression of Symptoms:  same  Accompanying Signs & Symptoms:  Fever/chills: no  Flank pain: no  Nausea and vomiting: no  Vaginal symptoms: none  Abdominal/Pelvic Pain: no  History:   History of frequent UTI s: no  History of kidney stones: no  Sexually Active: no  Possibility of pregnancy: No  Precipitating or alleviating factors: None  Therapies tried and " outcome: OTC advil or tylenol         Review of Systems   Constitutional, HEENT, cardiovascular, pulmonary, gi and gu systems are negative, except as otherwise noted.      Objective    /66   Pulse 104   Wt 98.4 kg (217 lb)   SpO2 98%   BMI 40.34 kg/m    Body mass index is 40.34 kg/m .  Physical Exam   GENERAL: healthy, alert and no distress  NECK: no adenopathy, no asymmetry, masses, or scars and thyroid normal to palpation  RESP: lungs clear to auscultation - no rales, rhonchi or wheezes  CV: regular rates and rhythm and normal S1 S2, no S3 or S4  MS: no gross musculoskeletal defects noted, no edema  SKIN: no suspicious lesions or rashes    Results for orders placed or performed in visit on 03/05/21 (from the past 24 hour(s))   UA with Microscopic reflex to Culture    Specimen: Midstream Urine   Result Value Ref Range    Color Urine Yellow     Appearance Urine Clear     Glucose Urine Negative NEG^Negative mg/dL    Bilirubin Urine Negative NEG^Negative    Ketones Urine Negative NEG^Negative mg/dL    Specific Gravity Urine 1.015 1.003 - 1.035    pH Urine 6.0 5.0 - 7.0 pH    Protein Albumin Urine Negative NEG^Negative mg/dL    Urobilinogen Urine 0.2 0.2 - 1.0 EU/dL    Nitrite Urine Negative NEG^Negative    Blood Urine Small (A) NEG^Negative    Leukocyte Esterase Urine Negative NEG^Negative    Source Midstream Urine     WBC Urine 0 - 5 OTO5^0 - 5 /HPF    RBC Urine O - 2 OTO2^O - 2 /HPF    Squamous Epithelial /LPF Urine Few FEW^Few /LPF    Bacteria Urine Few (A) NEG^Negative /HPF

## 2021-03-30 PROCEDURE — T1013 SIGN LANG/ORAL INTERPRETER: HCPCS | Mod: U3

## 2021-03-31 ENCOUNTER — IMMUNIZATION (OUTPATIENT)
Dept: NURSING | Facility: CLINIC | Age: 76
End: 2021-03-31
Payer: COMMERCIAL

## 2021-03-31 PROCEDURE — 0001A PR COVID VAC PFIZER DIL RECON 30 MCG/0.3 ML IM: CPT

## 2021-03-31 PROCEDURE — 91300 PR COVID VAC PFIZER DIL RECON 30 MCG/0.3 ML IM: CPT

## 2021-04-21 ENCOUNTER — IMMUNIZATION (OUTPATIENT)
Dept: NURSING | Facility: CLINIC | Age: 76
End: 2021-04-21
Attending: INTERNAL MEDICINE
Payer: COMMERCIAL

## 2021-04-21 PROCEDURE — 91300 PR COVID VAC PFIZER DIL RECON 30 MCG/0.3 ML IM: CPT

## 2021-04-21 PROCEDURE — 0002A PR COVID VAC PFIZER DIL RECON 30 MCG/0.3 ML IM: CPT

## 2021-06-02 ENCOUNTER — OFFICE VISIT (OUTPATIENT)
Dept: INTERNAL MEDICINE | Facility: CLINIC | Age: 76
End: 2021-06-02
Payer: COMMERCIAL

## 2021-06-02 VITALS
HEART RATE: 120 BPM | SYSTOLIC BLOOD PRESSURE: 120 MMHG | RESPIRATION RATE: 16 BRPM | BODY MASS INDEX: 40.34 KG/M2 | TEMPERATURE: 96.2 F | WEIGHT: 217 LBS | OXYGEN SATURATION: 97 % | DIASTOLIC BLOOD PRESSURE: 70 MMHG

## 2021-06-02 DIAGNOSIS — L08.9 LOCAL INFECTION OF SKIN AND SUBCUTANEOUS TISSUE: Primary | ICD-10-CM

## 2021-06-02 PROCEDURE — 99213 OFFICE O/P EST LOW 20 MIN: CPT | Performed by: PHYSICIAN ASSISTANT

## 2021-06-02 RX ORDER — SULFAMETHOXAZOLE/TRIMETHOPRIM 800-160 MG
1 TABLET ORAL 2 TIMES DAILY
Qty: 14 TABLET | Refills: 0 | Status: SHIPPED | OUTPATIENT
Start: 2021-06-02 | End: 2021-11-10

## 2021-06-02 NOTE — PROGRESS NOTES
Assessment & Plan     Local infection of skin and subcutaneous tissue    - sulfamethoxazole-trimethoprim (BACTRIM DS) 800-160 MG tablet; Take 1 tablet by mouth 2 times daily             Patient Instructions   Warm compresses and warm water over the area in the shower.  Do not pick or squeeze at area.     Antibiotic for skin infection twice a day for one week.         Return in about 2 weeks (around 6/16/2021) for recheck if not improving, regular primary provider.    SABRA Diaz [facility] is a 76 year old who presents for the following health issues  accompanied by her :    HPI     Concern - Boil on bottom  Onset: 2-3 days  Description: lump  Intensity: moderate  Progression of Symptoms:  worsening  Accompanying Signs & Symptoms: pressure, pain, increased in size  Previous history of similar problem: yes  Precipitating factors:        Worsened by: sitting on the spot  Alleviating factors:        Improved by: none  Therapies tried and outcome: myprocin cream        Review of Systems   Constitutional, HEENT, cardiovascular, pulmonary, gi and gu systems are negative, except as otherwise noted.      Objective    /70 (BP Location: Left arm, Patient Position: Chair, Cuff Size: Adult Large)   Pulse 120   Temp 96.2  F (35.7  C) (Temporal)   Resp 16   Wt 98.4 kg (217 lb)   SpO2 97%   BMI 40.34 kg/m    Body mass index is 40.34 kg/m .  Physical Exam   GENERAL: healthy, alert and no distress  RESP: lungs clear to auscultation - no rales, rhonchi or wheezes  CV: regular rates and rhythm and normal S1 S2, no S3 or S4  MS: no gross musculoskeletal defects noted, no edema  SKIN: left lower buttock  Redness and induration noted about 3 cm - 4 cm in diameter. NO fluctuance no drainage.

## 2021-06-02 NOTE — PATIENT INSTRUCTIONS
Warm compresses and warm water over the area in the shower.  Do not pick or squeeze at area.     Antibiotic for skin infection twice a day for one week.

## 2021-09-13 ENCOUNTER — IMMUNIZATION (OUTPATIENT)
Dept: INTERNAL MEDICINE | Facility: CLINIC | Age: 76
End: 2021-09-13
Payer: COMMERCIAL

## 2021-09-13 PROCEDURE — G0008 ADMIN INFLUENZA VIRUS VAC: HCPCS

## 2021-09-13 PROCEDURE — 90662 IIV NO PRSV INCREASED AG IM: CPT

## 2021-10-04 DIAGNOSIS — E78.5 HYPERLIPIDEMIA WITH TARGET LDL LESS THAN 130: ICD-10-CM

## 2021-10-04 DIAGNOSIS — I10 ESSENTIAL HYPERTENSION, BENIGN: ICD-10-CM

## 2021-10-05 NOTE — TELEPHONE ENCOUNTER
Routing refill request to provider for review/approval because:  Diuretics (Including Combos) Protocol [facility]77/04/2021 09:17 AM   Normal serum creatinine on file in past 12 months Protocol Details    Normal serum potassium on file in past 12 months     Normal serum sodium on file in past 12 months       LDL on file in past 12 months

## 2021-10-06 RX ORDER — ATORVASTATIN CALCIUM 20 MG/1
TABLET, FILM COATED ORAL
Qty: 90 TABLET | Refills: 0 | Status: SHIPPED | OUTPATIENT
Start: 2021-10-06 | End: 2022-01-26

## 2021-10-06 RX ORDER — LISINOPRIL AND HYDROCHLOROTHIAZIDE 12.5; 2 MG/1; MG/1
TABLET ORAL
Qty: 180 TABLET | Refills: 0 | Status: SHIPPED | OUTPATIENT
Start: 2021-10-06 | End: 2022-01-26

## 2021-11-10 ENCOUNTER — OFFICE VISIT (OUTPATIENT)
Dept: URGENT CARE | Facility: URGENT CARE | Age: 76
End: 2021-11-10
Payer: COMMERCIAL

## 2021-11-10 VITALS
TEMPERATURE: 98.7 F | SYSTOLIC BLOOD PRESSURE: 138 MMHG | OXYGEN SATURATION: 94 % | DIASTOLIC BLOOD PRESSURE: 74 MMHG | HEART RATE: 116 BPM | RESPIRATION RATE: 22 BRPM

## 2021-11-10 DIAGNOSIS — J06.9 UPPER RESPIRATORY TRACT INFECTION, UNSPECIFIED TYPE: ICD-10-CM

## 2021-11-10 DIAGNOSIS — L02.92 BOIL: Primary | ICD-10-CM

## 2021-11-10 LAB — SARS-COV-2 RNA RESP QL NAA+PROBE: NEGATIVE

## 2021-11-10 PROCEDURE — U0003 INFECTIOUS AGENT DETECTION BY NUCLEIC ACID (DNA OR RNA); SEVERE ACUTE RESPIRATORY SYNDROME CORONAVIRUS 2 (SARS-COV-2) (CORONAVIRUS DISEASE [COVID-19]), AMPLIFIED PROBE TECHNIQUE, MAKING USE OF HIGH THROUGHPUT TECHNOLOGIES AS DESCRIBED BY CMS-2020-01-R: HCPCS | Performed by: FAMILY MEDICINE

## 2021-11-10 PROCEDURE — 99214 OFFICE O/P EST MOD 30 MIN: CPT | Performed by: FAMILY MEDICINE

## 2021-11-10 PROCEDURE — U0005 INFEC AGEN DETEC AMPLI PROBE: HCPCS | Performed by: FAMILY MEDICINE

## 2021-11-10 NOTE — PROGRESS NOTES
Assessment & Plan     Boil    - amoxicillin-clavulanate (AUGMENTIN) 875-125 MG tablet; Take 1 tablet by mouth 2 times daily for 10 days    Recommend continued heat to area and close Follow-up if no change or worsening sx.    Upper respiratory tract infection, unspecified type    - amoxicillin-clavulanate (AUGMENTIN) 875-125 MG tablet; Take 1 tablet by mouth 2 times daily for 10 days  - Symptomatic COVID-19 Virus (Coronavirus) by PCR    Will treat URI with Augmentin as well.  COVID test to doublecheck not a breakthrough case with cold sx.    Alyssa Ac MD  [facility] ESPERANZA Capellan is a 76 year old who presents for the following health issues     HPI     L buttock recurrent boil- nickel-sized.  URI sx x 1-2 weeks    Video  used for entirety of visit.    Hx of recurrent boils- has new lesion on upper outer LEFT thigh/buttock area.  Draining slightly serosanguinous fluid.  Hot packing.    Also with increased cough and cold sx.  Is COVID vaccinated.  No fever or chills.  More chest congestion.  Productive cough x 2 weeks.    Review of Systems   Constitutional, HEENT, cardiovascular, pulmonary, GI, , musculoskeletal, neuro, skin, endocrine and psych systems are negative, except as otherwise noted.      Objective    /74   Pulse 116   Temp 98.7  F (37.1  C) (Tympanic)   Resp 22   SpO2 94%   There is no height or weight on file to calculate BMI.  Physical Exam   GENERAL: healthy, alert and no distress  EYES: Eyes grossly normal to inspection, PERRL and conjunctivae and sclerae normal  HENT: ear canals and TM's normal, nose and mouth without ulcers or lesions  NECK: no adenopathy, no asymmetry, masses, or scars and thyroid normal to palpation  RESP: lungs clear to auscultation - no rales, rhonchi or wheezes, harsh congested cough  CV: regular rate and rhythm, normal S1 S2, no S3 or S4, no murmur, click or rub, no peripheral edema and peripheral  pulses strong  ABDOMEN: soft, nontender, no hepatosplenomegaly, no masses and bowel sounds normal  MS: no gross musculoskeletal defects noted, no edema  SKIN: nickel-sized boil draining on LEFT lower buttock/upper thigh  PSYCH: mentation appears normal, affect normal/bright

## 2022-03-01 ENCOUNTER — LAB (OUTPATIENT)
Dept: LAB | Facility: CLINIC | Age: 77
End: 2022-03-01
Payer: COMMERCIAL

## 2022-03-01 DIAGNOSIS — Z11.59 NEED FOR HEPATITIS C SCREENING TEST: ICD-10-CM

## 2022-03-01 DIAGNOSIS — R73.01 ELEVATED FASTING GLUCOSE: ICD-10-CM

## 2022-03-01 DIAGNOSIS — Z13.220 SCREENING FOR HYPERLIPIDEMIA: ICD-10-CM

## 2022-03-01 DIAGNOSIS — E78.5 HYPERLIPIDEMIA WITH TARGET LDL LESS THAN 130: ICD-10-CM

## 2022-03-01 DIAGNOSIS — I10 ESSENTIAL HYPERTENSION, BENIGN: ICD-10-CM

## 2022-03-01 LAB
ALT SERPL W P-5'-P-CCNC: 24 U/L (ref 0–50)
ANION GAP SERPL CALCULATED.3IONS-SCNC: 7 MMOL/L (ref 3–14)
BUN SERPL-MCNC: 17 MG/DL (ref 7–30)
CALCIUM SERPL-MCNC: 9.9 MG/DL (ref 8.5–10.1)
CHLORIDE BLD-SCNC: 98 MMOL/L (ref 94–109)
CHOLEST SERPL-MCNC: 160 MG/DL
CO2 SERPL-SCNC: 26 MMOL/L (ref 20–32)
CREAT SERPL-MCNC: 0.71 MG/DL (ref 0.52–1.04)
FASTING STATUS PATIENT QL REPORTED: YES
GFR SERPL CREATININE-BSD FRML MDRD: 87 ML/MIN/1.73M2
GLUCOSE BLD-MCNC: 118 MG/DL (ref 70–99)
HBA1C MFR BLD: 5.3 % (ref 0–5.6)
HCV AB SERPL QL IA: NONREACTIVE
HDLC SERPL-MCNC: 60 MG/DL
LDLC SERPL CALC-MCNC: 74 MG/DL
NONHDLC SERPL-MCNC: 100 MG/DL
POTASSIUM BLD-SCNC: 3.7 MMOL/L (ref 3.4–5.3)
SODIUM SERPL-SCNC: 131 MMOL/L (ref 133–144)
TRIGL SERPL-MCNC: 128 MG/DL

## 2022-03-01 PROCEDURE — 86803 HEPATITIS C AB TEST: CPT

## 2022-03-01 PROCEDURE — 80061 LIPID PANEL: CPT

## 2022-03-01 PROCEDURE — 80048 BASIC METABOLIC PNL TOTAL CA: CPT

## 2022-03-01 PROCEDURE — 36415 COLL VENOUS BLD VENIPUNCTURE: CPT

## 2022-03-01 PROCEDURE — 83036 HEMOGLOBIN GLYCOSYLATED A1C: CPT

## 2022-03-01 PROCEDURE — 84460 ALANINE AMINO (ALT) (SGPT): CPT

## 2022-03-02 ENCOUNTER — OFFICE VISIT (OUTPATIENT)
Dept: INTERNAL MEDICINE | Facility: CLINIC | Age: 77
End: 2022-03-02
Payer: COMMERCIAL

## 2022-03-02 VITALS
HEART RATE: 95 BPM | BODY MASS INDEX: 35.64 KG/M2 | SYSTOLIC BLOOD PRESSURE: 124 MMHG | TEMPERATURE: 97.4 F | OXYGEN SATURATION: 96 % | DIASTOLIC BLOOD PRESSURE: 80 MMHG | RESPIRATION RATE: 16 BRPM | WEIGHT: 193.7 LBS | HEIGHT: 62 IN

## 2022-03-02 DIAGNOSIS — R05.9 COUGH: ICD-10-CM

## 2022-03-02 DIAGNOSIS — I10 ESSENTIAL HYPERTENSION, BENIGN: ICD-10-CM

## 2022-03-02 DIAGNOSIS — R73.03 PREDIABETES: ICD-10-CM

## 2022-03-02 DIAGNOSIS — D12.6 SERRATED ADENOMA OF COLON: ICD-10-CM

## 2022-03-02 DIAGNOSIS — E66.01 SEVERE OBESITY (BMI 35.0-35.9 WITH COMORBIDITY) (H): ICD-10-CM

## 2022-03-02 DIAGNOSIS — E78.5 HYPERLIPIDEMIA WITH TARGET LDL LESS THAN 130: ICD-10-CM

## 2022-03-02 DIAGNOSIS — Z12.11 SCREEN FOR COLON CANCER: ICD-10-CM

## 2022-03-02 DIAGNOSIS — J45.30 MILD PERSISTENT ASTHMA WITHOUT COMPLICATION: ICD-10-CM

## 2022-03-02 DIAGNOSIS — Z00.00 MEDICARE ANNUAL WELLNESS VISIT, SUBSEQUENT: Primary | ICD-10-CM

## 2022-03-02 PROCEDURE — T1013 SIGN LANG/ORAL INTERPRETER: HCPCS | Mod: U3

## 2022-03-02 PROCEDURE — 99214 OFFICE O/P EST MOD 30 MIN: CPT | Mod: 25 | Performed by: INTERNAL MEDICINE

## 2022-03-02 PROCEDURE — 99397 PER PM REEVAL EST PAT 65+ YR: CPT | Performed by: INTERNAL MEDICINE

## 2022-03-02 RX ORDER — ALBUTEROL SULFATE 90 UG/1
2 AEROSOL, METERED RESPIRATORY (INHALATION) EVERY 4 HOURS PRN
Qty: 18 G | Refills: 11 | Status: SHIPPED | OUTPATIENT
Start: 2022-03-02 | End: 2023-08-03

## 2022-03-02 RX ORDER — ATORVASTATIN CALCIUM 20 MG/1
TABLET, FILM COATED ORAL
Qty: 30 TABLET | Refills: 0 | OUTPATIENT
Start: 2022-03-02

## 2022-03-02 RX ORDER — ATORVASTATIN CALCIUM 20 MG/1
20 TABLET, FILM COATED ORAL EVERY EVENING
Qty: 90 TABLET | Refills: 3 | Status: SHIPPED | OUTPATIENT
Start: 2022-03-02 | End: 2023-05-22

## 2022-03-02 RX ORDER — LISINOPRIL AND HYDROCHLOROTHIAZIDE 12.5; 2 MG/1; MG/1
2 TABLET ORAL EVERY MORNING
Qty: 180 TABLET | Refills: 3 | Status: SHIPPED | OUTPATIENT
Start: 2022-03-02 | End: 2023-05-22

## 2022-03-02 RX ORDER — LISINOPRIL AND HYDROCHLOROTHIAZIDE 12.5; 2 MG/1; MG/1
2 TABLET ORAL EVERY MORNING
Qty: 60 TABLET | Refills: 0 | OUTPATIENT
Start: 2022-03-02

## 2022-03-02 ASSESSMENT — ASTHMA QUESTIONNAIRES: ACT_TOTALSCORE: 25

## 2022-03-02 NOTE — PROGRESS NOTES
Assessment & Plan     (Z00.00) Medicare annual wellness visit, subsequent  (primary encounter diagnosis)  Comment: Discussed cardiac disease risk factors and cardiac disease risk factor modification, including diabetes screening, blood pressure screening (and management if indicated), and cholesterol screening.   Reviewed immunzation guidelines, including pneumococcal vaccines, annual influenza, and shingles vaccines.   Discussed routine cancer screenings, including skin cancer, colon cancer screening for everyone until age 80, prostate cancer screening in men until age 75, mammogram and PAP/pelvic for women until age 75.   Recommended regular dentist visits to care for remaining teeth.   Recommended regular screening for vision and glaucoma.   Recommended safe driving and accident avoidance.   Plan: REVIEW OF HEALTH MAINTENANCE PROTOCOL ORDERS            (E66.01,  Z68.35) Severe obesity (BMI 35.0-35.9 with comorbidity) (H)  Comment: Patient with weight loss through dietary and lifestyle changes.  Prediabetes and hypertension radicular related to the diabetes and the diabetes specifically showed significant provement with the weight loss and food changes.  Plan: REVIEW OF HEALTH MAINTENANCE PROTOCOL ORDERS            (I10) Essential hypertension, benign  Comment: This condition is currently controlled on the current medical regimen.  Continue current therapy.   Plan: lisinopril-hydrochlorothiazide (ZESTORETIC)         20-12.5 MG tablet, REVIEW OF HEALTH MAINTENANCE        PROTOCOL ORDERS            (E78.5) Hyperlipidemia with target LDL less than 130  Comment: Discussed guidelines recommending a statin cholesterol lowering medication for any patient with either diabetes and/or vascular disease, aiming for a LDL goal under 100 for sure, ideally under 70, using whatever dose of statin tolerated.    Plan: atorvastatin (LIPITOR) 20 MG tablet, REVIEW OF         HEALTH MAINTENANCE PROTOCOL ORDERS            (R05.9)  "Cough  Comment: Occasional use of albuterol.  Plan: albuterol (PROAIR HFA/PROVENTIL HFA/VENTOLIN         HFA) 108 (90 Base) MCG/ACT inhaler, REVIEW OF         HEALTH MAINTENANCE PROTOCOL ORDERS            (D12.6) Serrated adenoma of colon  Comment: Last colonoscopy had a large serrated adenoma removed, was recommended for repeat colonoscopy 2021 but she did not go.  Repeat colonoscopy now, if exam satisfactory to be likely the last colonoscopy for her based on age.  Plan: REVIEW OF HEALTH MAINTENANCE PROTOCOL ORDERS            (R73.03) Prediabetes  Comment: Significant improvement in her diabetic labs through lifestyle changes in diet.  No longer prediabetic, is in the normal range.  Plan: REVIEW OF HEALTH MAINTENANCE PROTOCOL ORDERS            (J45.30) Mild persistent asthma without complication  Comment: This condition is currently controlled on the current medical regimen.  Continue current therapy.   Plan: REVIEW OF HEALTH MAINTENANCE PROTOCOL ORDERS            (Z12.11) Screen for colon cancer  Comment: Discussed current colon cancer screening recommendations.    Colonoscopy as the gold standard for colon cancer screening as this gives opportunity to identify and remove precancerous polyps.  We can send a referral for this procedure as needed.     If colonoscopy not covered or the patient does not want to do this study and the patient is average risk for colon cancer, then I recommended either the ColoGuard stool test every 3 years or the FIT test annually.  I explained that a positive test for either of these tests does not necessarily mean colon cancer, it just means that they will need a more advanced test like colonoscopy.      Plan: Adult Gastro Ref - Procedure Only, REVIEW OF         HEALTH MAINTENANCE PROTOCOL ORDERS                        BMI:   Estimated body mass index is 36.01 kg/m  as calculated from the following:    Height as of this encounter: 1.562 m (5' 1.5\").    Weight as of this encounter: " 87.9 kg (193 lb 11.2 oz).           Return in about 1 year (around 3/2/2023) for Medicare Annual Wellness Exam, Blood pressure, with pre-visit fasting labs.    Chris Bhatia MD  [facility] Center[facility]RISTY Capellan is a 76 year old who presents for the following health issues  accompanied by her  .    HPI     Hyperlipidemia Follow-Up      Are you regularly taking any medication or supplement to lower your cholesterol?   Yes- atorvastatin    Are you having muscle aches or other side effects that you think could be caused by your cholesterol lowering medication?  No    Hypertension Follow-up      Do you check your blood pressure regularly outside of the clinic? Yes     Are you following a low salt diet? Yes    Are your blood pressures ever more than 140 on the top number (systolic) OR more   than 90 on the bottom number (diastolic), for example 140/90? No     Asthma Follow-Up    Was ACT completed today?    Yes    ACT Total Scores 3/2/2022   ACT TOTAL SCORE (Goal Greater than or Equal to 20) 25   In the past 12 months, how many times did you visit the emergency room for your asthma without being admitted to the hospital? 0   In the past 12 months, how many times were you hospitalized overnight because of your asthma? 0          How many days per week do you miss taking your asthma controller medication?  7- patient not using control inhaler     Please describe any recent triggers for your asthma: None    Have you had any Emergency Room Visits, Urgent Care Visits, or Hospital Admissions since your last office visit?  No     4.  The patient has a history of impaired glucose tolerance with regularly elevated blood sugars.    They have not been diagnosed with type II DM or placed on medications for diabetes before.   They deny polyuria, polydipsia.     The patient is obese with a BMI of Body mass index is 36.01 kg/m ..    Review of current labs show:    Lab Results  "  Component Value Date    A1C 5.3 03/01/2022    A1C 5.9 07/07/2020    A1C 5.8 01/30/2018    A1C 5.3 02/15/2017    A1C 5.8 07/07/2015    A1C 5.4 12/18/2012     @bgl@     5.    The patient has had a history of ongoing obesity.  Reviewed the weigth curves.   Their current BMI is:  Body mass index is 36.01 kg/m .  Reviewed previous attempts at weight loss which have not been successful in producing prolonged weigth loss.   Discussed current eating and exercise habits.     Child was in weight this past year with improved dietary changes including low carbohydrate and low-fat foods.    Reviewed weights in chart:  Wt Readings from Last 10 Encounters:   03/02/22 87.9 kg (193 lb 11.2 oz)   06/02/21 98.4 kg (217 lb)   03/05/21 98.4 kg (217 lb)   10/09/20 98.9 kg (218 lb)   08/28/20 99.1 kg (218 lb 6.4 oz)   07/29/20 99.8 kg (220 lb)   07/10/20 100.2 kg (220 lb 12.8 oz)   05/18/20 96.6 kg (213 lb)   01/08/20 96.6 kg (213 lb)   09/19/19 97.8 kg (215 lb 8 oz)            Annual Wellness Visit    Patient has been advised of split billing requirements and indicates understanding: Yes     Are you in the first 12 months of your Medicare Part B coverage?  No    Physical Health:    In general, how would you rate your overall physical health? good    Outside of work, how many days during the week do you exercise?2-3 days/week    Outside of work, approximately how many minutes a day do you exercise?    If you drink alcohol do you typically have >3 drinks per day or >7 drinks per week? No    Do you usually eat at least 4 servings of fruit and vegetables a day, include whole grains & fiber and avoid regularly eating high fat or \"junk\" foods? Yes    Do you have any problems taking medications regularly? No    Do you have any side effects from medications? none    Needs assistance for the following daily activities: telephone use and Due to deafness    Which of the following safety concerns are present in your home?  none identified " "    Hearing impairment: Yes, chronic deafness    In the past 6 months, have you been bothered by leaking of urine? no    Mental Health:    In general, how would you rate your overall mental or emotional health? good  PHQ-2 Score: 0    Do you feel safe in your environment? Yes    Have you ever done Advance Care Planning? (For example, a Health Directive, POLST, or a discussion with a medical provider or your loved ones about your wishes)?     Fall risk:  Fallen 2 or more times in the past year?: No  Any fall with injury in the past year?: No    Cognitive Screening:   Clock draw: NORMAL  3) 3 item recall:   Results: NORMAL clock,  COGNITIVE IMPAIRMENT LESS LIKELY    Mini-CogTM Copyright S Samantha. Licensed by the author for use in [facility]; reprinted with permission ([email or web address]). All rights reserved.      Do you have sleep apnea, excessive snoring or daytime drowsiness?: no    Current providers sharing in care for this patient include:   Patient Care Team:  Chris Bhatia MD as PCP - General (Internal Medicine)  Chris Bhatia MD as Assigned PCP    Patient has been advised of split billing requirements and indicates understanding: Yes     **I reviewed the information recorded in the patient's EPIC chart (including but not limited to medical history, surgical history, family history, problem list, medication list, and allergy list) and updated the information as indicated based on the patients reported information.       Review of Systems   Constitutional, HEENT, cardiovascular, pulmonary, gi and gu systems are negative, except as otherwise noted.      Objective    /80   Pulse 95   Temp 97.4  F (36.3  C) (Temporal)   Resp 16   Ht 1.562 m (5' 1.5\")   Wt 87.9 kg (193 lb 11.2 oz)   SpO2 96%   BMI 36.01 kg/m    Body mass index is 36.01 kg/m .  Physical Exam   GENERAL alert and no distress  EYES:  Normal sclera,conjunctiva, EOMI  HENT: oral and posterior pharynx without " lesions or erythema, facies symmetric  NECK: Neck supple. No LAD, without thyroidmegaly.  RESP: Clear to ausculation bilaterally without wheezes or crackles. Normal BS in all fields.  CV: RRR normal S1S2 without murmurs, rubs or gallops.  LYMPH: no cervical lymph adenopathy appreciated  MS: extremities- no gross deformities of the visible extremities noted,   EXT:  no lower extremity edema  PSYCH: Alert and oriented times 3; speech- coherent  SKIN:  No obvious significant skin lesions on visible portions of face

## 2022-03-02 NOTE — PATIENT INSTRUCTIONS
"*  Continue all medications at the same doses.  Contact your usual pharmacy if you need refills.     *  The \"pre-diabetes\" has resolved with the better food choices.     *  Repeat Colonoscopy this spring due to the large pre-cancerous polyp removed in 2018.      --Karin Lopezgraciela Pineda [phone] or [phone] will contact you to arrange this procedure.     *  Get in ot see the Ophthomology clinic ( eye specialists) for repeat eye exam.         5 GOALS TO PREVENT VASCULAR DISEASE:     1.  Aggressive blood pressure control, under 130/80 ideally.  Using medications if needed.    Your blood pressure is under good control    BP Readings from Last 4 Encounters:   03/02/22 124/80   11/10/21 138/74   06/02/21 120/70   03/05/21 118/66       2.  Aggressive LDL cholesterol (\"bad cholesterol\") lowering as indicated.    Your goal is an LDL under 130 for sure, preferably under 100.  (If you have diabetes or previous vascular disease, the the LDL goals would be under 100 for sure, preferably under 70.)    New guidelines identify four high-risk groups who could benefit from statins:   *people with pre-existing heart disease, such as those who have had a heart attack;   *people ages 40 to 75 who have diabetes of any type  *patients ages 40 to 75 with at least a 7.5% risk of developing cardiovascular disease over the next decade, according to a formula described in the guidelines  *patients with the sort of super-high cholesterol that sometimes runs in families, as evidenced by an LDL of 190 milligrams per deciliter or higher    Your cholesterol levels are well controlled.    Recent Labs   Lab Test 03/01/22  0831 07/07/20  0913 02/15/17  0806 10/02/15  0758 07/07/15  0803   CHOL 160 177   < > 273* 215*   HDL 60 50   < > 50* 48*   LDL 74 91   < > 183* 131*   TRIG 128 178*   < > 200* 178*   CHOLHDLRATIO  --   --   --  5.5* 4.5    < > = values in this interval not displayed.       3.  Aggressive diabetic " prevention, screening and/or management.      You do not have diabetes as of the most recent blood tests.     4.  No smoking    5.  Consider daily preventative aspirin over age 50 if you have enough cardiac risk factors to place you at higher risk for the presence of vascular disease.    If you have any reason not to take aspirin such easy bruising or bleeding, stomach problems, other anticoagulant medications, or any other side effects, then you should not take Aspirin.     --Based on your current cardiac disease risk profile and/or age over 75, you do NOT need to take daily preventative aspirin.       Preventive Health Recommendations  Female Ages 75+    Yearly exam: See your health care provider every year in order to  o Review health changes.   o Discuss preventive care.    o Review your medicines if your doctor has prescribed any.      Get a Pap test every three years (unless you have an abnormal result and your provider advises testing more often).    No more PAP smear needed.      You do not need a Pap test if your uterus was removed (hysterectomy) and you have not had cancer.    You should be tested each year for STDs (sexually transmitted diseases) if you're at risk.     Routine screening mammogram no longer indicated.    No routine screening colonoscopy indiacted     Have a cholesterol test every 5 years, or more often if advised.    Have a diabetes test (fasting glucose) every three years. If you are at risk for diabetes, you should have this test more often.     If you are at risk for osteoporosis (brittle bone disease), think about having a bone density scan (DEXA).    Shots:   *  Get a flu shot each year.   *  Get a tetanus shot every 10 years.    *  Pneumonia vaccine up to date.      Nutrition:     Eat at least 5 servings of fruits and vegetables each day.    Eat whole-grain bread, whole-wheat pasta and brown rice instead of white grains and rice.    Talk to your provider about Calcium and Vitamin D.  "   --Calcium: aim for 1200 mg per day (any brand is fine)   --Vitamin D3 at least 1000 units per day (any brand is fine)       --Good Grains:  Oats, brown rice, Quinoa (these do not raise the blood sugar as much)     --Bad grains:  Anything made from wheat or white rice     (because these raise the blood sugars significantly, and the possible gluten issue from wheat for some people).      --Proteins:  Aim for \"lean proteins\" including chicken, fish, seafood, pork, turkey, and eggs (in moderation); Eat red meat only occasionally        Lifestyle    Exercise at least 150 minutes a week (30 minutes a day, 5 days a week). This will help you control your weight and prevent disease.    Limit alcohol to one drink per day.    No smoking.     Wear sunscreen to prevent skin cancer.     See your dentist every six months for an exam and cleaning.    See your eye doctor every 1 to 2 years.            "

## 2022-03-02 NOTE — TELEPHONE ENCOUNTER
This refill is a duplicate of something already sent to the pharmacy or another refill already in process.   Nancy Henao RN  [facility]

## 2022-08-03 ENCOUNTER — TRANSFERRED RECORDS (OUTPATIENT)
Dept: INTERNAL MEDICINE | Facility: CLINIC | Age: 77
End: 2022-08-03

## 2022-09-21 DIAGNOSIS — Z23 NEED FOR PROPHYLACTIC VACCINATION AND INOCULATION AGAINST INFLUENZA: Primary | ICD-10-CM

## 2022-09-21 PROCEDURE — 99207 PR NO CHARGE NURSE ONLY: CPT | Performed by: INTERNAL MEDICINE

## 2022-09-21 PROCEDURE — 90662 IIV NO PRSV INCREASED AG IM: CPT | Performed by: INTERNAL MEDICINE

## 2022-09-21 PROCEDURE — 90471 IMMUNIZATION ADMIN: CPT | Performed by: INTERNAL MEDICINE

## 2022-09-21 NOTE — PROGRESS NOTES
Patient was with her  at his appointment today and requested a flu shot.  I gave her the injection and documented

## 2023-07-29 ENCOUNTER — ANCILLARY PROCEDURE (OUTPATIENT)
Dept: GENERAL RADIOLOGY | Facility: CLINIC | Age: 78
End: 2023-07-29
Attending: PHYSICIAN ASSISTANT
Payer: COMMERCIAL

## 2023-07-29 ENCOUNTER — OFFICE VISIT (OUTPATIENT)
Dept: URGENT CARE | Facility: URGENT CARE | Age: 78
End: 2023-07-29
Payer: COMMERCIAL

## 2023-07-29 VITALS
OXYGEN SATURATION: 97 % | DIASTOLIC BLOOD PRESSURE: 61 MMHG | TEMPERATURE: 98 F | BODY MASS INDEX: 34.2 KG/M2 | WEIGHT: 184 LBS | SYSTOLIC BLOOD PRESSURE: 123 MMHG

## 2023-07-29 DIAGNOSIS — R11.0 NAUSEA: ICD-10-CM

## 2023-07-29 DIAGNOSIS — J45.901 MODERATE ASTHMA WITH EXACERBATION, UNSPECIFIED WHETHER PERSISTENT: ICD-10-CM

## 2023-07-29 DIAGNOSIS — R05.1 ACUTE COUGH: Primary | ICD-10-CM

## 2023-07-29 DIAGNOSIS — J20.8 ACUTE BACTERIAL BRONCHITIS: ICD-10-CM

## 2023-07-29 DIAGNOSIS — B96.89 ACUTE BACTERIAL BRONCHITIS: ICD-10-CM

## 2023-07-29 PROCEDURE — 99214 OFFICE O/P EST MOD 30 MIN: CPT | Performed by: PHYSICIAN ASSISTANT

## 2023-07-29 PROCEDURE — 71046 X-RAY EXAM CHEST 2 VIEWS: CPT | Mod: TC | Performed by: RADIOLOGY

## 2023-07-29 PROCEDURE — 87635 SARS-COV-2 COVID-19 AMP PRB: CPT | Performed by: PHYSICIAN ASSISTANT

## 2023-07-29 RX ORDER — PREDNISONE 20 MG/1
20 TABLET ORAL 2 TIMES DAILY
Qty: 10 TABLET | Refills: 0 | Status: SHIPPED | OUTPATIENT
Start: 2023-07-29 | End: 2023-08-16

## 2023-07-29 RX ORDER — ONDANSETRON 4 MG/1
4 TABLET, ORALLY DISINTEGRATING ORAL EVERY 8 HOURS PRN
Qty: 12 TABLET | Refills: 0 | Status: SHIPPED | OUTPATIENT
Start: 2023-07-29 | End: 2024-10-03

## 2023-07-29 RX ORDER — BENZONATATE 200 MG/1
200 CAPSULE ORAL 3 TIMES DAILY PRN
Qty: 21 CAPSULE | Refills: 0 | Status: SHIPPED | OUTPATIENT
Start: 2023-07-29 | End: 2023-08-05

## 2023-07-29 NOTE — PROGRESS NOTES
Assessment & Plan   Problem List Items Addressed This Visit    None  Visit Diagnoses       Acute cough    -  Primary    Relevant Medications    benzonatate (TESSALON) 200 MG capsule    Other Relevant Orders    Symptomatic COVID-19 Virus (Coronavirus) by PCR Nose    Moderate asthma with exacerbation, unspecified whether persistent        Relevant Medications    predniSONE (DELTASONE) 20 MG tablet    Other Relevant Orders    XR Chest 2 Views (Completed)    Acute bacterial bronchitis        Relevant Medications    amoxicillin-clavulanate (AUGMENTIN) 875-125 MG tablet    Nausea        Relevant Medications    ondansetron (ZOFRAN ODT) 4 MG ODT tab           Chest xray Negative for acute findings, read by Kd PULIDO at time of visit.    Review of external notes as documented elsewhere in note    At today's visit with Marilia Palma , we discussed results, diagnosis, medications and formulated a plan.  We also discussed red flags for immediate return to clinic/ER, as well as indications for follow up with PCP if not improved in 3 days. Patient understood and agreed to plan. Marilia Palma was discharged with stable vitals and has no further questions.     No follow-ups on file.    Kd Cardoso, MMS, PALIBAN  [facility] CorporationCHRISTY Capellan is a 78 year old, presenting for the following health issues:  Cough (Chest cough and congestion ) and Nasal Congestion (Using otc inhaler )      HPI   Review of Systems   Constitutional, HEENT, cardiovascular, pulmonary, GI, , musculoskeletal, neuro, skin, endocrine and psych systems are negative, except as otherwise noted.      Objective    /61 (BP Location: Right arm, Patient Position: Chair, Cuff Size: Adult Regular)   Temp 98  F (36.7  C)   Wt 83.5 kg (184 lb)   SpO2 97%   BMI 34.20 kg/m    Body mass index is 34.2 kg/m .  Physical Exam   GENERAL: healthy, alert and no distress  EYES: Eyes grossly normal to inspection, PERRL  and conjunctivae and sclerae normal  HENT: ear canals and TM's normal, nose and mouth without ulcers or lesions  NECK: no adenopathy, no asymmetry, masses, or scars and thyroid normal to palpation  RESP: lungs clear to auscultation - no rales, rhonchi or wheezes and expiratory wheezes throughout  CV: regular rate and rhythm, normal S1 S2, no S3 or S4, no murmur, click or rub, no peripheral edema and peripheral pulses strong  ABDOMEN: soft, nontender, no hepatosplenomegaly, no masses and bowel sounds normal  MS: no gross musculoskeletal defects noted, no edema  SKIN: no suspicious lesions or rashes  NEURO: Normal strength and tone, mentation intact and speech normal  PSYCH: mentation appears normal, affect normal/bright    Chest xray Negative for acute findings, read by Kd PULIDO at time of visit.

## 2023-07-30 LAB — SARS-COV-2 RNA RESP QL NAA+PROBE: NEGATIVE

## 2023-07-31 ENCOUNTER — LAB (OUTPATIENT)
Dept: LAB | Facility: CLINIC | Age: 78
End: 2023-07-31
Payer: COMMERCIAL

## 2023-07-31 DIAGNOSIS — I10 ESSENTIAL HYPERTENSION, BENIGN: ICD-10-CM

## 2023-07-31 DIAGNOSIS — R73.01 ELEVATED FASTING GLUCOSE: Primary | ICD-10-CM

## 2023-07-31 DIAGNOSIS — E78.5 HYPERLIPIDEMIA WITH TARGET LDL LESS THAN 130: ICD-10-CM

## 2023-07-31 LAB
ALT SERPL W P-5'-P-CCNC: 23 U/L (ref 0–50)
ANION GAP SERPL CALCULATED.3IONS-SCNC: 10 MMOL/L (ref 7–15)
BUN SERPL-MCNC: 20.9 MG/DL (ref 8–23)
CALCIUM SERPL-MCNC: 10.2 MG/DL (ref 8.8–10.2)
CHLORIDE SERPL-SCNC: 93 MMOL/L (ref 98–107)
CHOLEST SERPL-MCNC: 156 MG/DL
CREAT SERPL-MCNC: 0.78 MG/DL (ref 0.51–0.95)
DEPRECATED HCO3 PLAS-SCNC: 28 MMOL/L (ref 22–29)
GFR SERPL CREATININE-BSD FRML MDRD: 77 ML/MIN/1.73M2
GLUCOSE SERPL-MCNC: 100 MG/DL (ref 70–99)
HBA1C MFR BLD: 5.6 % (ref 0–5.6)
HDLC SERPL-MCNC: 78 MG/DL
LDLC SERPL CALC-MCNC: 66 MG/DL
NONHDLC SERPL-MCNC: 78 MG/DL
POTASSIUM SERPL-SCNC: 3.9 MMOL/L (ref 3.4–5.3)
SODIUM SERPL-SCNC: 131 MMOL/L (ref 136–145)
TRIGL SERPL-MCNC: 61 MG/DL

## 2023-07-31 PROCEDURE — 80048 BASIC METABOLIC PNL TOTAL CA: CPT

## 2023-07-31 PROCEDURE — 83036 HEMOGLOBIN GLYCOSYLATED A1C: CPT

## 2023-07-31 PROCEDURE — 36415 COLL VENOUS BLD VENIPUNCTURE: CPT

## 2023-07-31 PROCEDURE — 80061 LIPID PANEL: CPT

## 2023-07-31 PROCEDURE — 84460 ALANINE AMINO (ALT) (SGPT): CPT

## 2023-08-02 ENCOUNTER — PATIENT OUTREACH (OUTPATIENT)
Dept: GASTROENTEROLOGY | Facility: CLINIC | Age: 78
End: 2023-08-02
Payer: COMMERCIAL

## 2023-08-02 DIAGNOSIS — Z12.11 SPECIAL SCREENING FOR MALIGNANT NEOPLASMS, COLON: Primary | ICD-10-CM

## 2023-08-02 NOTE — PROGRESS NOTES
"Ordering/Referring Provider: Chris Bhatia    BMI: Estimated body mass index is 34.2 kg/m  as calculated from the following:    Height as of 3/2/22: 1.562 m (5' 1.5\").    Weight as of 7/29/23: 83.5 kg (184 lb).     Sedation:  Based on patient's medical history patient is appropriate for   moderate sedation. If patient's BMI > 50 do not schedule in ASC.    Location:  Does patient have an LVAD?  No    Does patient have a history of moderate to severe sleep apnea?  No    Does patient have a history of asthma, COPD or any other lung disease?  Yes     Patient has a documented history of Asthma.     Review of chart, indicate respiratory disease  mild, persistent, without complications .    Does patient use home oxygen?  No    Does patient have a history of cardiac disease?  No    Is patient awaiting a heart or lung transplant?   No    Has patient had a stroke or transient ischemic attack in the last 6 months?   No    Is the patient currently on dialysis?   No    Prep:  Previous prep (last colonoscopy):   Could not locate    Quality of previous prep:   Good    Is patient currently on dialysis, is ESRD, or CKD stage 4/5?   No (standard prep)    Does patient have a diagnosis of diabetes?  No    Does patient have a diagnosis of cystic fibrosis (CF)?  No    BMI > 40?  No    Final Referral Status:  meets the criteria for placement of colonoscopy screening  referral order.      Referral order placed with the following recommendations:  Sedation: Moderate Sedation  Location Type: ASC  Prep: MiraLAX (No Mag Citrate)  "

## 2023-08-03 ENCOUNTER — OFFICE VISIT (OUTPATIENT)
Dept: INTERNAL MEDICINE | Facility: CLINIC | Age: 78
End: 2023-08-03
Payer: COMMERCIAL

## 2023-08-03 VITALS
DIASTOLIC BLOOD PRESSURE: 64 MMHG | SYSTOLIC BLOOD PRESSURE: 128 MMHG | BODY MASS INDEX: 34.6 KG/M2 | OXYGEN SATURATION: 94 % | HEIGHT: 62 IN | HEART RATE: 92 BPM | WEIGHT: 188 LBS | TEMPERATURE: 98.1 F

## 2023-08-03 DIAGNOSIS — J45.30 MILD PERSISTENT ASTHMA WITHOUT COMPLICATION: ICD-10-CM

## 2023-08-03 DIAGNOSIS — Z13.6 CARDIOVASCULAR SCREENING; LDL GOAL LESS THAN 130: ICD-10-CM

## 2023-08-03 DIAGNOSIS — E66.01 SEVERE OBESITY (BMI 35.0-35.9 WITH COMORBIDITY) (H): ICD-10-CM

## 2023-08-03 DIAGNOSIS — E78.5 HYPERLIPIDEMIA WITH TARGET LDL LESS THAN 130: ICD-10-CM

## 2023-08-03 DIAGNOSIS — J98.01 POST-INFECTION BRONCHOSPASM: ICD-10-CM

## 2023-08-03 DIAGNOSIS — R73.03 PREDIABETES: ICD-10-CM

## 2023-08-03 DIAGNOSIS — Z00.00 ENCOUNTER FOR MEDICARE ANNUAL WELLNESS EXAM: Primary | ICD-10-CM

## 2023-08-03 DIAGNOSIS — I10 ESSENTIAL HYPERTENSION, BENIGN: ICD-10-CM

## 2023-08-03 PROCEDURE — G0439 PPPS, SUBSEQ VISIT: HCPCS | Performed by: INTERNAL MEDICINE

## 2023-08-03 PROCEDURE — 99214 OFFICE O/P EST MOD 30 MIN: CPT | Mod: 25 | Performed by: INTERNAL MEDICINE

## 2023-08-03 PROCEDURE — T1013 SIGN LANG/ORAL INTERPRETER: HCPCS | Mod: U3

## 2023-08-03 RX ORDER — ALBUTEROL SULFATE 90 UG/1
2 AEROSOL, METERED RESPIRATORY (INHALATION) EVERY 4 HOURS PRN
Qty: 18 G | Refills: 11 | Status: SHIPPED | OUTPATIENT
Start: 2023-08-03 | End: 2024-10-03

## 2023-08-03 RX ORDER — ATORVASTATIN CALCIUM 20 MG/1
20 TABLET, FILM COATED ORAL EVERY EVENING
Qty: 90 TABLET | Refills: 3 | Status: SHIPPED | OUTPATIENT
Start: 2023-08-03 | End: 2024-09-16

## 2023-08-03 RX ORDER — LISINOPRIL AND HYDROCHLOROTHIAZIDE 12.5; 2 MG/1; MG/1
2 TABLET ORAL EVERY MORNING
Qty: 180 TABLET | Refills: 0 | Status: SHIPPED | OUTPATIENT
Start: 2023-08-03 | End: 2023-08-16 | Stop reason: SINTOL

## 2023-08-03 ASSESSMENT — PAIN SCALES - GENERAL: PAINLEVEL: NO PAIN (0)

## 2023-08-03 ASSESSMENT — ACTIVITIES OF DAILY LIVING (ADL): CURRENT_FUNCTION: NO ASSISTANCE NEEDED

## 2023-08-03 NOTE — PATIENT INSTRUCTIONS
" Continue all medications at the same doses.  Contact your usual pharmacy if you need refills.      Covid vaccines are now recommended annually.  Get the most updated Covid vaccine when it becomes available, consider getting this at the same time as the annual influenza vaccine.         5 GOALS TO PREVENT VASCULAR DISEASE:     1.  Aggressive blood pressure control, under 130/80 ideally.  Using medications if needed.    Your blood pressure is under good control    BP Readings from Last 4 Encounters:   08/03/23 128/64   07/29/23 123/61   03/02/22 124/80   11/10/21 138/74       2.  Aggressive LDL cholesterol (\"bad cholesterol\") lowering as indicated.    Your goal is an LDL under 130 for sure, preferably under 100.  (If you have diabetes or previous vascular disease, the the LDL goals would be under 100 for sure, preferably under 70.)    New guidelines identify four high-risk groups who could benefit from statins:   *people with pre-existing heart disease, such as those who have had a heart attack;   *people ages 40 to 75 who have diabetes of any type  *patients ages 40 to 75 with at least a 7.5% risk of developing cardiovascular disease over the next decade, according to a formula described in the guidelines  *patients with the sort of super-high cholesterol that sometimes runs in families, as evidenced by an LDL of 190 milligrams per deciliter or higher    Your cholesterol levels are well controlled.    Recent Labs   Lab Test 07/31/23  0804 03/01/22  0831 02/15/17  0806 10/02/15  0758 07/07/15  0803   CHOL 156 160   < > 273* 215*   HDL 78 60   < > 50* 48*   LDL 66 74   < > 183* 131*   TRIG 61 128   < > 200* 178*   CHOLHDLRATIO  --   --   --  5.5* 4.5    < > = values in this interval not displayed.       3.  Aggressive diabetic prevention, screening and/or management.      You do not have diabetes as of the most recent blood tests.     4.  No smoking    5.  Consider daily preventative aspirin over age 50 if you have " enough cardiac risk factors to place you at higher risk for the presence of vascular disease.    If you have any reason not to take aspirin such easy bruising or bleeding, stomach problems, other anticoagulant medications, or any other side effects, then you should not take Aspirin.     --Based on your current cardiac disease risk profile and/or age over 75, you do NOT need to take daily preventative aspirin.      Preventive Health Recommendations  Female Ages 75+    Yearly exam: See your health care provider every year in order to  Review health changes.   Discuss preventive care.    Review your medicines if your doctor has prescribed any.    Get a Pap test every three years (unless you have an abnormal result and your provider advises testing more often).  No more PAP smear needed.    You do not need a Pap test if your uterus was removed (hysterectomy) and you have not had cancer.  You should be tested each year for STDs (sexually transmitted diseases) if you're at risk.   Routine screening mammogram no longer indicated.  No routine screening colonoscopy indiacted   Have a cholesterol test every 5 years, or more often if advised.  Have a diabetes test (fasting glucose) every three years. If you are at risk for diabetes, you should have this test more often.   If you are at risk for osteoporosis (brittle bone disease), think about having a bone density scan (DEXA).    Shots:   *  Get a flu shot each year.   *  Get a tetanus shot every 10 years.    *  Pneumonia vaccine up to date.      Nutrition:   Eat at least 5 servings of fruits and vegetables each day.  Eat whole-grain bread, whole-wheat pasta and brown rice instead of white grains and rice.  Talk to your provider about Calcium and Vitamin D.    --Calcium: aim for 1200 mg per day (any brand is fine)   --Vitamin D3 at least 1000 units per day (any brand is fine)       --Good Grains:  Oats, brown rice, Quinoa (these do not raise the blood sugar as much)     --Bad  "grains:  Anything made from wheat or white rice     (because these raise the blood sugars significantly, and the possible gluten issue from wheat for some people).      --Proteins:  Aim for \"lean proteins\" including chicken, fish, seafood, pork, turkey, and eggs (in moderation); Eat red meat only occasionally        Lifestyle  Exercise at least 150 minutes a week (30 minutes a day, 5 days a week). This will help you control your weight and prevent disease.  Limit alcohol to one drink per day.  No smoking.   Wear sunscreen to prevent skin cancer.   See your dentist every six months for an exam and cleaning.  See your eye doctor every 1 to 2 years.            Patient Education   Personalized Prevention Plan  You are due for the preventive services outlined below.  Your care team is available to assist you in scheduling these services.  If you have already completed any of these items, please share that information with your care team to update in your medical record.  Health Maintenance Due   Topic Date Due    Osteoporosis Screening  Never done    Asthma Action Plan - yearly  Never done    Colorectal Cancer Screening  03/27/2021    Asthma Control Test  09/02/2022    Annual Wellness Visit  03/02/2023     Bladder Training: Care Instructions  Your Care Instructions     Bladder training is used to treat urge incontinence and stress incontinence. Urge incontinence means that the need to urinate comes on so fast that you can't get to a toilet in time. Stress incontinence means that you leak urine because of pressure on your bladder. For example, it may happen when you laugh, cough, or lift something heavy.  Bladder training can increase how long you can wait before you have to urinate. It can also help your bladder hold more urine. And it can give you better control over the urge to urinate.  It is important to remember that bladder training takes a few weeks to a few months to make a difference. You may not see results " right away, but don't give up.  Follow-up care is a key part of your treatment and safety. Be sure to make and go to all appointments, and call your doctor if you are having problems. It's also a good idea to know your test results and keep a list of the medicines you take.  How can you care for yourself at home?  Work with your doctor to come up with a bladder training program that is right for you. You may use one or more of the following methods.  Delayed urination  In the beginning, try to keep from urinating for 5 minutes after you first feel the need to go.  While you wait, take deep, slow breaths to relax. Kegel exercises can also help you delay the need to go to the bathroom.  After some practice, when you can easily wait 5 minutes to urinate, try to wait 10 minutes before you urinate.  Slowly increase the waiting period until you are able to control when you have to urinate.  Scheduled urination  Empty your bladder when you first wake up in the morning.  Schedule times throughout the day when you will urinate.  Start by going to the bathroom every hour, even if you don't need to go.  Slowly increase the time between trips to the bathroom.  When you have found a schedule that works well for you, keep doing it.  If you wake up during the night and have to urinate, do it. Apply your schedule to waking hours only.  Kegel exercises  These tighten and strengthen pelvic muscles, which can help you control the flow of urine. (If doing these exercises causes pain, stop doing them and talk with your doctor.) To do Kegel exercises:  Squeeze your muscles as if you were trying not to pass gas. Or squeeze your muscles as if you were stopping the flow of urine. Your belly, legs, and buttocks shouldn't move.  Hold the squeeze for 3 seconds, then relax for 5 to 10 seconds.  Start with 3 seconds, then add 1 second each week until you are able to squeeze for 10 seconds.  Repeat the exercise 10 times a session. Do 3 to 8  "sessions a day.  When should you call for help?  Watch closely for changes in your health, and be sure to contact your doctor if:    Your incontinence is getting worse.     You do not get better as expected.   Where can you learn more?  Go to https://www."[employer].net/patiented  Enter V684 in the search box to learn more about \"Bladder Training: Care Instructions.\"  Current as of: March 1, 2023               Content Version: 13.7    4002-4699 [email or web address].   Care instructions adapted under license by your healthcare professional. If you have questions about a medical condition or this instruction, always ask your healthcare professional. [email or web address] disclaims any warranty or liability for your use of this information.         "

## 2023-08-03 NOTE — PROGRESS NOTES
SUBJECTIVE:   Marilia is a 78 year old who presents for Preventive Visit.      8/3/2023     8:41 AM   Additional Questions   Roomed by Kerrie HUNG CMA   Accompanied by  Ten and Octavio PEARSON      Deaf, here with sign language interpretor.     Are you in the first 12 months of your Medicare coverage?  No    Healthy Habits:     In general, how would you rate your overall health?  Very good    Frequency of exercise:  2-3 days/week    Duration of exercise:  Other (unsure of actual time spent but does do regularly)    Barriers to taking medications:  None    Medication side effects:  None    Ability to successfully perform activities of daily living:  No assistance needed    Home Safety:  Lack of grab bars in the bathroom    Hearing impairment symptoms: patient is deaf- uses ASL.    In the past 6 months, have you been bothered by leaking of urine? Yes    In general, how would you rate your overall mental or emotional health?  Very good    Additional concerns today:  Yes (brought list of questions)    Have you ever done Advance Care Planning? (For example, a Health Directive, POLST, or a discussion with a medical provider or your loved ones about your wishes): No, advance care planning information given to patient to review.  Patient plans to discuss their wishes with loved ones or provider.      Fall risk  Fallen 2 or more times in the past year?: No  Any fall with injury in the past year?: No    Cognitive Screening- declined  1) Repeat 3 items (Leader, Season, Table)    2) Clock draw: NORMAL  3) 3 item recall: Recalls 3 objects  Results: 3 items recalled: COGNITIVE IMPAIRMENT LESS LIKELY    Mini-CogTM Copyright TABITHA Singh. Licensed by the author for use in [facility]; reprinted with permission (kamryn@.[facility]). All rights reserved.      Do you have sleep apnea, excessive snoring or daytime drowsiness? : no    Reviewed and updated as needed this visit by clinical staff   Tobacco  Allergies   Meds  Problems             Reviewed and updated as needed this visit by Provider                 Social History     Tobacco Use     Smoking status: Never     Smokeless tobacco: Never   Substance Use Topics     Alcohol use: No     Comment: Rare           No data to display              Do you have a current opioid prescription? No  Do you use any other controlled substances or medications that are not prescribed by a provider? None      Hypertension:  History of hypertension, on medication.  No reported side effects from medications.    Reviewed last 6 BP readings in chart:  BP Readings from Last 6 Encounters:   08/16/23 134/84   08/11/23 101/47   08/03/23 128/64   07/29/23 123/61   03/02/22 124/80   11/10/21 138/74     No active cardiac complaints or symptoms with exercise.       The patient has a history of impaired glucose tolerance with regularly elevated blood sugars.    They have not been diagnosed with type II DM or placed on medications for diabetes before.   They deny polyuria, polydipsia.     The patient is obese with a BMI of Body mass index is 34.39 kg/m ..    Review of current labs show:    Lab Results   Component Value Date    A1C 5.6 07/31/2023    A1C 5.3 03/01/2022    A1C 5.9 07/07/2020    A1C 5.8 01/30/2018    A1C 5.3 02/15/2017    A1C 5.8 07/07/2015    A1C 5.4 12/18/2012     @bgl@       The patient has had a history of ongoing obesity.  Reviewed the weigth curves.   Their current BMI is:  Body mass index is 34.39 kg/m .  Reviewed previous attempts at weight loss which have not been successful in producing prolonged weigth loss.   Discussed current eating and exercise habits.     Reviewed weights in chart:  Wt Readings from Last 10 Encounters:   08/16/23 83.6 kg (184 lb 4.8 oz)   08/03/23 85.3 kg (188 lb)   07/29/23 83.5 kg (184 lb)   03/02/22 87.9 kg (193 lb 11.2 oz)   06/02/21 98.4 kg (217 lb)   03/05/21 98.4 kg (217 lb)   10/09/20 98.9 kg (218 lb)   08/28/20 99.1 kg (218 lb 6.4 oz)   07/29/20  99.8 kg (220 lb)   07/10/20 100.2 kg (220 lb 12.8 oz)        Asthma stable.  Has not had any recent breathing troubles beyond usual baseline.  Has not any recent acute respiratory events.  Using medication as directed with reported side effects        7/10/2020    10:09 AM 3/2/2022     8:37 AM 8/3/2023     9:17 AM   ACT Total Scores   ACT TOTAL SCORE (Goal Greater than or Equal to 20) 21 25 25   In the past 12 months, how many times did you visit the emergency room for your asthma without being admitted to the hospital? 0 0 0   In the past 12 months, how many times were you hospitalized overnight because of your asthma? 0 0 0          Current providers sharing in care for this patient include:   Patient Care Team:  Chris Bhatia MD as PCP - General (Internal Medicine)  Chris Bhatia MD as Assigned PCP    The following health maintenance items are reviewed in Epic and correct as of today:  Health Maintenance   Topic Date Due     DEXA  Never done     ASTHMA ACTION PLAN  Never done     COLORECTAL CANCER SCREENING  03/27/2021     INFLUENZA VACCINE (1) 09/01/2023     COVID-19 Vaccine (6 - Pfizer series) 10/10/2023     ASTHMA CONTROL TEST  02/03/2024     A1C  07/31/2024     ALT  07/31/2024     BMP  07/31/2024     LIPID  07/31/2024     MEDICARE ANNUAL WELLNESS VISIT  08/03/2024     ANNUAL REVIEW OF HM ORDERS  08/03/2024     FALL RISK ASSESSMENT  08/03/2024     ADVANCE CARE PLANNING  08/03/2028     DTAP/TDAP/TD IMMUNIZATION (3 - Td or Tdap) 09/19/2029     HEPATITIS C SCREENING  Completed     PHQ-2 (once per calendar year)  Completed     HEMOGLOBIN  Completed     Pneumococcal Vaccine: 65+ Years  Completed     ZOSTER IMMUNIZATION  Completed     IPV IMMUNIZATION  Aged Out     MENINGITIS IMMUNIZATION  Aged Out     MAMMO SCREENING  Discontinued       **I reviewed the information recorded in the patient's EPIC chart (including but not limited to medical history, surgical history, family history, problem  "list, medication list, and allergy list) and updated the information as indicated based on the patients reported information.         Pertinent mammograms are reviewed under the imaging tab.    Review of Systems  Constitutional, HEENT, cardiovascular, pulmonary, gi and gu systems are negative, except as otherwise noted.    OBJECTIVE:   /64   Pulse 92   Temp 98.1  F (36.7  C) (Oral)   Ht 1.575 m (5' 2\")   Wt 85.3 kg (188 lb)   LMP  (LMP Unknown)   SpO2 94%   Breastfeeding No   BMI 34.39 kg/m   Estimated body mass index is 34.39 kg/m  as calculated from the following:    Height as of this encounter: 1.575 m (5' 2\").    Weight as of this encounter: 85.3 kg (188 lb).  Physical Exam  GENERAL alert and no distress  EYES:  Normal sclera,conjunctiva, EOMI  HENT: oral and posterior pharynx without lesions or erythema, facies symmetric  NECK: Neck supple. No LAD, without thyroidmegaly.  RESP: Clear to ausculation bilaterally without wheezes or crackles. Normal BS in all fields.  CV: RRR normal S1S2 without murmurs, rubs or gallops.  LYMPH: no cervical lymph adenopathy appreciated  MS: extremities- no gross deformities of the visible extremities noted,   EXT:  no lower extremity edema  PSYCH: Alert and oriented times 3; no real speech, does all communication through sign language intrepretor  SKIN:  No obvious significant skin lesions on visible portions of face     Diagnostic Test Results:  Labs reviewed in Epic    ASSESSMENT / PLAN:     (Z00.00) Encounter for Medicare annual wellness exam  (primary encounter diagnosis)  Comment: Discussed cardiac disease risk factors and cardiac disease risk factor modification, including diabetes screening, blood pressure screening (and management if indicated), and cholesterol screening.   Reviewed immunzation guidelines, including pneumococcal vaccines, annual influenza, and shingles vaccines.   Discussed routine cancer screenings, including skin cancer, colon cancer " screening for everyone until age 80, prostate cancer screening in men until age 75, mammogram and PAP/pelvic for women until age 75.   Recommended regular dentist visits to care for remaining teeth.   Recommended regular screening for vision and glaucoma.   Recommended safe driving and accident avoidance.   Plan: PRIMARY CARE FOLLOW-UP SCHEDULING, REVIEW OF         HEALTH MAINTENANCE PROTOCOL ORDERS            (E66.01,  Z68.35) Severe obesity (BMI 35.0-35.9 with comorbidity) (H)  Comment: obesity contributies to HTN and hyperlipidemia   Current BMI is: Body mass index is 34.39 kg/m ..  Reviewed weight patterns.   Obesity as a biological preventable and treatable disease, which is associated with significantly increased risk of many acute and chronic health conditions.   Obesity has now been recognized as a chronic disease by the American Medical Association.  Obesity has serious co-morbidities associated with obesity including increased risk for hypertension, stroke, coronary artery disease, dyslipidemia, Type II diabetes, depression, sleep apnea, cancers of the colon, breast and endometrium, obstructive sleep apnea, osteoarthritis and female infertility.  Recommended regular aerobic exercise, recommended improved diet aiming at lowering amount of processed foods, lower sugars, moderation/reduction of simple carbs and fat in the diet, establishing more regular meal times, always eating breakfast, front loading some of the calories and adding more protein to the diet.      Plan: PRIMARY CARE FOLLOW-UP SCHEDULING, REVIEW OF         HEALTH MAINTENANCE PROTOCOL ORDERS            (I10) Essential hypertension, benign  Comment: This condition is currently controlled on the current medical regimen.  Continue current therapy.   Plan: PRIMARY CARE FOLLOW-UP SCHEDULING, REVIEW OF         HEALTH MAINTENANCE PROTOCOL ORDERS,         DISCONTINUED: lisinopril-hydrochlorothiazide         (ZESTORETIC) 20-12.5 MG tablet         "    (J45.30) Mild persistent asthma without complication  Comment: This condition is currently controlled on the current medical regimen.  Continue current therapy.   Plan: albuterol (PROAIR HFA/PROVENTIL HFA/VENTOLIN         HFA) 108 (90 Base) MCG/ACT inhaler, PRIMARY         CARE FOLLOW-UP SCHEDULING, REVIEW OF HEALTH         MAINTENANCE PROTOCOL ORDERS            (E78.5) Hyperlipidemia with target LDL less than 130  Comment: This condition is currently controlled on the current medical regimen.  Continue current therapy.   Plan: PRIMARY CARE FOLLOW-UP SCHEDULING, atorvastatin        (LIPITOR) 20 MG tablet, REVIEW OF HEALTH         MAINTENANCE PROTOCOL ORDERS            (R73.03) Prediabetes  Comment: Reviewed the labs showing mildly elevated glucose levels consistent with prediabetes.     Discussed \"pre-diabetes\", impaired glucose tolerance, and its part in the dysmetabolic syndrome.   No medications needed at this time.     Discussed the inevitable progression of impaired glucose tolerance toward worsening diabetes mellitus if nothing is changed in the diet, and the need for agressive interventions now to delay and prevent this inevitable progression.    Recommended lower carb diet.  Recommended regular physical activity.   We will continue to monitor this and kerr additional recommendations and treatments as indicated based on the labs.     Plan: PRIMARY CARE FOLLOW-UP SCHEDULING, REVIEW OF         HEALTH MAINTENANCE PROTOCOL ORDERS            (Z13.6) CARDIOVASCULAR SCREENING; LDL GOAL LESS THAN 130  Comment: Discussed cardiac disease risk factors and cardiac disease risk factor modification.   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  Comment:   Plan: albuterol (PROAIR HFA/PROVENTIL HFA/VENTOLIN         HFA) 108 (90 Base) MCG/ACT inhaler, PRIMARY         CARE FOLLOW-UP SCHEDULING, REVIEW OF HEALTH         " MAINTENANCE PROTOCOL ORDERS               Patient has been advised of split billing requirements and indicates understanding: Yes      COUNSELING:  Reviewed preventive health counseling, as reflected in patient instructions       Regular exercise       Healthy diet/nutrition       Vision screening       Hearing screening       Dental care       Bladder control       Fall risk prevention       Immunizations  Covid vaccines are now recommended annually.  Get the most updated Covid vaccine when it becomes available, consider getting this at the same time as the annual influenza vaccine.            Colon cancer screening        She reports that she has never smoked. She has never used smokeless tobacco.      Appropriate preventive services were discussed with this patient, including applicable screening as appropriate for cardiovascular disease, diabetes, osteopenia/osteoporosis, and glaucoma.  As appropriate for age/gender, discussed screening for colorectal cancer, prostate cancer, breast cancer, and cervical cancer. Checklist reviewing preventive services available has been given to the patient.    Reviewed patients plan of care and provided an AVS. The  valente Belladesi meets the Care Plan requirement. This Care Plan has been established and reviewed with the .          Chris Bhatia MD  [facility]    Identified Health Risks:

## 2023-08-11 ENCOUNTER — HOSPITAL ENCOUNTER (EMERGENCY)
Facility: CLINIC | Age: 78
Discharge: HOME | End: 2023-08-11
Attending: EMERGENCY MEDICINE | Admitting: EMERGENCY MEDICINE
Payer: COMMERCIAL

## 2023-08-11 ENCOUNTER — TELEPHONE (OUTPATIENT)
Dept: INTERNAL MEDICINE | Facility: CLINIC | Age: 78
End: 2023-08-11
Payer: COMMERCIAL

## 2023-08-11 VITALS
RESPIRATION RATE: 19 BRPM | SYSTOLIC BLOOD PRESSURE: 101 MMHG | DIASTOLIC BLOOD PRESSURE: 47 MMHG | OXYGEN SATURATION: 96 % | TEMPERATURE: 98.3 F | HEART RATE: 111 BPM

## 2023-08-11 DIAGNOSIS — T78.40XA ALLERGIC REACTION, INITIAL ENCOUNTER: ICD-10-CM

## 2023-08-11 DIAGNOSIS — T78.3XXA ANGIOEDEMA, INITIAL ENCOUNTER: ICD-10-CM

## 2023-08-11 LAB
ATRIAL RATE - MUSE: 100 BPM
DIASTOLIC BLOOD PRESSURE - MUSE: NORMAL MMHG
INTERPRETATION ECG - MUSE: NORMAL
P AXIS - MUSE: 57 DEGREES
PR INTERVAL - MUSE: 160 MS
QRS DURATION - MUSE: 74 MS
QT - MUSE: 330 MS
QTC - MUSE: 425 MS
R AXIS - MUSE: 19 DEGREES
SYSTOLIC BLOOD PRESSURE - MUSE: NORMAL MMHG
T AXIS - MUSE: 59 DEGREES
VENTRICULAR RATE- MUSE: 100 BPM

## 2023-08-11 PROCEDURE — 93005 ELECTROCARDIOGRAM TRACING: CPT

## 2023-08-11 PROCEDURE — 250N000009 HC RX 250: Performed by: EMERGENCY MEDICINE

## 2023-08-11 PROCEDURE — 96375 TX/PRO/DX INJ NEW DRUG ADDON: CPT

## 2023-08-11 PROCEDURE — 99285 EMERGENCY DEPT VISIT HI MDM: CPT | Mod: 25

## 2023-08-11 PROCEDURE — 250N000011 HC RX IP 250 OP 636: Mod: JZ | Performed by: EMERGENCY MEDICINE

## 2023-08-11 PROCEDURE — 96374 THER/PROPH/DIAG INJ IV PUSH: CPT

## 2023-08-11 RX ORDER — DIPHENHYDRAMINE HYDROCHLORIDE 50 MG/ML
50 INJECTION INTRAMUSCULAR; INTRAVENOUS ONCE
Status: COMPLETED | OUTPATIENT
Start: 2023-08-11 | End: 2023-08-11

## 2023-08-11 RX ORDER — PREDNISONE 20 MG/1
TABLET ORAL
Qty: 10 TABLET | Refills: 0 | Status: SHIPPED | OUTPATIENT
Start: 2023-08-11 | End: 2023-08-16

## 2023-08-11 RX ORDER — HYDROCHLOROTHIAZIDE 25 MG/1
25 TABLET ORAL DAILY
Qty: 30 TABLET | Refills: 0 | Status: SHIPPED | OUTPATIENT
Start: 2023-08-11 | End: 2023-08-16 | Stop reason: ALTCHOICE

## 2023-08-11 RX ORDER — EPINEPHRINE 0.3 MG/.3ML
0.3 INJECTION SUBCUTANEOUS ONCE
Status: DISCONTINUED | OUTPATIENT
Start: 2023-08-11 | End: 2023-08-11

## 2023-08-11 RX ORDER — METHYLPREDNISOLONE SODIUM SUCCINATE 125 MG/2ML
125 INJECTION, POWDER, LYOPHILIZED, FOR SOLUTION INTRAMUSCULAR; INTRAVENOUS ONCE
Status: COMPLETED | OUTPATIENT
Start: 2023-08-11 | End: 2023-08-11

## 2023-08-11 RX ADMIN — METHYLPREDNISOLONE SODIUM SUCCINATE 125 MG: 125 INJECTION, POWDER, FOR SOLUTION INTRAMUSCULAR; INTRAVENOUS at 06:25

## 2023-08-11 RX ADMIN — EPINEPHRINE 0.3 MG: 1 INJECTION INTRAMUSCULAR; INTRAVENOUS; SUBCUTANEOUS at 06:28

## 2023-08-11 RX ADMIN — DIPHENHYDRAMINE HYDROCHLORIDE 50 MG: 50 INJECTION, SOLUTION INTRAMUSCULAR; INTRAVENOUS at 06:22

## 2023-08-11 ASSESSMENT — ACTIVITIES OF DAILY LIVING (ADL): ADLS_ACUITY_SCORE: 35

## 2023-08-11 NOTE — ED TRIAGE NOTES
Pt is deaf. Daughter with her helping with ASL. Woke up at 0430 with diarrhea but noticed her lips were swollen. Pt takes lisinopril every day. Also states that she got stung by wasps 2 days ago and has hives to the back of the thighs. Took claritin at 0500.    Denies difficulty breathing or swallowing.    Triage Assessment       Row Name 08/11/23 0553       Triage Assessment (Adult)    Airway WDL WDL       Respiratory WDL    Respiratory WDL WDL       Skin Circulation/Temperature WDL    Skin Circulation/Temperature WDL WDL       Cardiac WDL    Cardiac WDL WDL       Peripheral/Neurovascular WDL    Peripheral Neurovascular WDL WDL       Cognitive/Neuro/Behavioral WDL    Cognitive/Neuro/Behavioral WDL WDL

## 2023-08-11 NOTE — ED PROVIDER NOTES
History     Chief Complaint:  Facial Swelling       The history is provided by the patient and a relative.  used: ASL interpreted by daughter.      Marilia Palma is a 78 year old female on Lisinopril for hypertension who presents with facial swelling. Per the patient as relayed by her daughter, she began experiencing facial swelling at 2230 yesterday. She reports that she regularly takes Lisinopril for hypertension. She also reports a rash on the back of her legs that she also noticed this morning. She states that she was stung by a bee on the back of her left leg a couple days ago without any symptoms at the time. She denies sore throat or swelling, difficulty breathing or swallowing, chest pain, shortness of breath, nausea or vomiting. She also denies using any new clothing, sheets or detergents or history of allergic reactions to bee stings. She took Claritin at 0530 today.       Independent Historian:    Daughter - They report as noted above.    Review of External Notes:  None    Medications:    Albuterol  Atorvastatin  Lisinopril-Hydrochlorothiazide   Ondansetron  Prednisone    Past Medical History:    Deaf  H. Pylori Infection  Hypertension  Hyperlipidemia  Obesity  Prediabetes  Serrated Adenoma of Colon    Past Surgical History:    EGD      Physical Exam   Patient Vitals for the past 24 hrs:   BP Temp Temp src Pulse Resp SpO2   08/11/23 0720 98/58 -- -- 101 19 95 %   08/11/23 0700 120/67 -- -- 98 18 97 %   08/11/23 0552 (!) 170/61 98.3  F (36.8  C) Oral (!) 126 13 97 %        Physical Exam  Nursing note and vitals reviewed.  Constitutional:  Oriented to person, place, and time. Cooperative.   HENT:   Nose:    Nose normal.   Mouth/Throat:   Edema to the lips but no intraoral or tongue edema.   Eyes:    Conjunctivae normal and EOM are normal.      Pupils are equal, round, and reactive to light.   Neck:    Trachea normal.   Cardiovascular:  Tachycardic rate, regular rhythm, normal heart  sounds and normal pulses. No murmur heard.  Pulmonary/Chest:  Effort normal and breath sounds normal.   Abdominal:   Soft. Normal appearance and bowel sounds are normal.      There is no tenderness.      There is no rebound and no CVA tenderness.   Musculoskeletal:  Extremities atraumatic x 4.   Lymphadenopathy:  No cervical adenopathy.   Neurological:   Alert and oriented to person, place, and time. Normal strength.      No cranial nerve deficit or sensory deficit. GCS eye subscore is 4. GCS verbal subscore is 5. GCS motor subscore is 6.   Skin:    Urticarial lesion to the left upper arm and a few urticarial lesions to the posterior left upper leg.   Psychiatric:   Normal mood and affect.    Emergency Department Course   ECG  ECG taken at 0622, ECG read at 0629  Normal sinus rhythm.  Normal ECG.    When compared with prior ECG, dated 6/3/19, ECG abnormalities resolved.   Rate 100 bpm. TN interval 160 ms. QRS duration 74 ms. QT/QTc 330/425 ms. P-R-T axes 57 19 59.    Emergency Department Course & Assessments:    Interventions:  Medications   diphenhydrAMINE (BENADRYL) injection 50 mg (50 mg Intravenous $Given 8/11/23 0622)   methylPREDNISolone sodium succinate (solu-MEDROL) injection 125 mg (125 mg Intravenous $Given 8/11/23 0625)   EPINEPHrine (ADRENALIN) kit 0.3 mg (0.3 mg Intramuscular $Given 8/11/23 0628)        Assessments:  0607 I obtained history and examined the patient as noted above.  0728 I rechecked the patient.   0820 I rechecked the patient.     Independent Interpretation (X-rays, CTs, rhythm strip):  None    Consultations/Discussion of Management or Tests:  None    Social Determinants of Health affecting care:  None     Disposition:  The patient was discharged to home.     Impression & Plan    Medical Decision Making:  This is a 78-year-old female who came in for further evaluation of lip swelling as well as an urticarial rash.  Her lips appeared to be secondary to angioedema or least consistent with  angioedema, and therefore I was concerned that this could be from the lisinopril.  However the fact that she also had 2 areas of urticarial lesions made an allergic reaction also a possibility and likely for at least those areas.  She was stung by a bee or wasp a couple of days ago as well, and therefore it is certainly possible that all could be from that.  I felt it was reasonable to provide her an IM injection of epinephrine as well as IV Solu-Medrol and Benadryl.  She was watched here for about 2 and half hours as well, and she seemed to improve especially with regards to the urticarial lesions.  I feel that she is safe for discharge and outpatient management.  However I think the safest option for her is to discontinue the lisinopril regardless.  I will substitute a hydrochlorothiazide prescription for that, and I recommended very close outpatient follow-up with her primary physician to discuss alternative options as well.  I also am going to provide her a prescription for 5 more days of prednisone in case she has ongoing symptoms.  She should also take Benadryl as needed and certainly return here with any concerns or worsening symptoms.    Diagnosis:    ICD-10-CM    1. Angioedema, initial encounter  T78.3XXA       2. Allergic reaction, initial encounter  T78.40XA            Discharge Medications:  New Prescriptions    HYDROCHLOROTHIAZIDE (HYDRODIURIL) 25 MG TABLET    Take 1 tablet (25 mg) by mouth daily for 30 days    PREDNISONE (DELTASONE) 20 MG TABLET    Take two tablets (= 40mg) each day for 5 (five) days          Scribe Disclosure:  I, Ford Rodríguez, am serving as a scribe at 8:36 AM on 8/11/2023 to document services personally performed by [facility] Zacarias MD based on my observations and the provider's statements to me.    8/11/2023   Mt Zacarias MD Lashkowitz, Seth H, MD  08/11/23 2107

## 2023-08-11 NOTE — TELEPHONE ENCOUNTER
Patient was seen in ER for allergic reaction after a bee sting. Patient was stopped for a BP med as they were not sure if it was from that and just a delayed reaction or they thought it was from the lisinopril. They told patient to be seen as soon as possible. You have some spots for Tuesday or Wednesday but need your approval to steal one of those. Is this ok to schedule in one of your available slots.

## 2023-08-11 NOTE — DISCHARGE INSTRUCTIONS
I want you to stop your lisinopril until you speak with your physician.  I will replace that with hydrochlorothiazide, which you do normally take with your lisinopril, as it is part of the combination medication that you currently take for your blood pressure.    Discharge Instructions  Allergic Reaction    An allergic reaction can result in a rash, itching, swelling, watery eyes, or a runny nose. A serious reaction can cause swelling of your mouth or throat, or difficulty breathing (wheezing). The most serious allergy is called anaphylaxis, and can be life-threatening. Many allergies result in hives, also called urticaria.       An allergy happens when the body s natural defense system (immune system) overreacts to something. The thing that triggers your allergic reaction is called an allergen. The first time you are exposed to your allergen, you may not have any reaction, but the body makes a protein called an antibody. The antibody lets the body recognize and remember the allergen.  Every time you are exposed to your allergen you get more antibody and your reaction can be more severe.      Generally, every Emergency Department visit should have a follow-up clinic visit with either a primary or a specialty clinic/provider. Please follow-up as instructed by your emergency provider today.    Call 911 if you have:  Swelling of the lips, tongue or throat.  Hoarse voice, drooling or trouble breathing.  Chest pain or shortness of breath.  Fainting or unconsciousness.    What can I do to help myself?  If you know what caused your allergy, do not touch it, throw any of it away, and tell others not to have it around you. Wear a medical alert bracelet with a name of your allergen on it.  If you do not know what you are allergic to, keep a journal of everything that you are exposed to (foods, soaps, medicines, etc.). Take this with you when you follow up with your primary provider or specialist (Allergist). This may help  determine what is causing the allergic reaction.  Take any medicines that are prescribed.  Antihistamines can decrease rash or itching. You may use Benadryl  (diphenhydramine) for rash or itching according to package directions, or use a prescription antihistamine as recommended by your provider.  For significant allergic reactions, you may have been given a prescription for an epinephrine (adrenaline) auto injector. Carry this with you at all times! Use it if you are having any symptoms of anaphylaxis.  Do not be afraid to use it. Return to the Emergency Department if you use your auto injector, call 911 if it does not resolve the symptoms. It is only meant to buy time until you can get to the Emergency Department!  If you were given a prescription for medicine here today, be sure to read all of the information (including the package insert) that comes with your prescription.  This will include important information about the medicine, its side effects, and any warnings that you need to know about.  The pharmacist who fills the prescription can provide more information and answer questions you may have about the medicine.  If you have questions or concerns that the pharmacist cannot address, please call or return to the Emergency Department.   Remember that you can always come back to the Emergency Department if you are not able to see your regular provider in the amount of time listed above, if you get any new symptoms, or if there is anything that worries you.

## 2023-08-11 NOTE — TELEPHONE ENCOUNTER
I reviewed the emergency room documentation.      Discontinue the lisinopril until further notice.  We can discuss other options at her follow-up appointment.      Okay to schedule patient is to one of the available same day spots next week.    If there are no same-day spots available, okay to schedule into the [facility] spot at 0800    Close encounter when done.    37.1

## 2023-08-16 ENCOUNTER — OFFICE VISIT (OUTPATIENT)
Dept: INTERNAL MEDICINE | Facility: CLINIC | Age: 78
End: 2023-08-16
Payer: COMMERCIAL

## 2023-08-16 VITALS
SYSTOLIC BLOOD PRESSURE: 134 MMHG | OXYGEN SATURATION: 97 % | DIASTOLIC BLOOD PRESSURE: 84 MMHG | WEIGHT: 184.3 LBS | TEMPERATURE: 98.2 F | HEART RATE: 95 BPM | BODY MASS INDEX: 33.92 KG/M2 | HEIGHT: 62 IN

## 2023-08-16 DIAGNOSIS — T63.441A ALLERGIC REACTION TO BEE STING: ICD-10-CM

## 2023-08-16 DIAGNOSIS — E78.5 HYPERLIPIDEMIA WITH TARGET LDL LESS THAN 130: ICD-10-CM

## 2023-08-16 DIAGNOSIS — H91.93 BILATERAL DEAFNESS: ICD-10-CM

## 2023-08-16 DIAGNOSIS — I10 ESSENTIAL HYPERTENSION, BENIGN: ICD-10-CM

## 2023-08-16 DIAGNOSIS — T78.3XXS ANGIOEDEMA, SEQUELA: Primary | ICD-10-CM

## 2023-08-16 PROCEDURE — 99214 OFFICE O/P EST MOD 30 MIN: CPT | Performed by: INTERNAL MEDICINE

## 2023-08-16 RX ORDER — LOSARTAN POTASSIUM AND HYDROCHLOROTHIAZIDE 12.5; 5 MG/1; MG/1
1 TABLET ORAL DAILY
Qty: 90 TABLET | Refills: 1 | Status: SHIPPED | OUTPATIENT
Start: 2023-08-16 | End: 2024-02-20

## 2023-08-16 ASSESSMENT — PAIN SCALES - GENERAL: PAINLEVEL: NO PAIN (0)

## 2023-08-16 NOTE — PROGRESS NOTES
"  Assessment & Plan   Problem List Items Addressed This Visit       Deaf    Hyperlipidemia with target LDL less than 130    Essential hypertension, benign    Relevant Medications    losartan-hydrochlorothiazide (HYZAAR) 50-12.5 MG tablet     Other Visit Diagnoses       Angioedema, sequela    -  Primary    Allergic reaction to bee sting                    The episode of the swelling and rash was most likely due mainly to the bee sting.      Lisinopril (and all medications in that class) can potentially very rarely cause swelling of the lips, face, or tongue, but you had been on this medication for several years without any side effects.  I suspect that the bee sting iniated this allergic response more than just Lisinopril alone.      Nonetheless, stop the Lisinopril,  we will add it to your allergy list so you do not recevie ti again.      Change to a similar effective blood pressure medication:  Losartan HCTZ 50/12.5, 1 tablet daily in the morning.    This is a similar dose to your former Lisinopril and work just as well.      Blood pressure treatment goals are under 140/90 most of the time.       Any residual swelling in the lip should resolve with time.      Continue all other medications at the same doses.  Contact your usual pharmacy if you need refills.      Covid vaccines are now recommended annually.  Get the most updated Covid vaccine when it becomes available, consider getting this at the same time as the annual influenza vaccine.      Assuming things are going well, then follow up with me again in August 2024.            MED REC REQUIRED  Post Medication Reconciliation Status: discharge medications reconciled and changed, per note/orders  BMI:   Estimated body mass index is 33.71 kg/m  as calculated from the following:    Height as of this encounter: 1.575 m (5' 2\").    Weight as of this encounter: 83.6 kg (184 lb 4.8 oz).           Chris Bhatia MD  [facility] " "ESPERANZA Capellan is a 78 year old, presenting for the following health issues:    Here with sign language interpretor.     Hospital F/U      HPI     ED/UC Followup:    Facility:  [facility]  Date of visit: 8/11/23  Reason for visit: Facial Swelling   Current Status: facial swelling resolved-was stung by bee- was given Epi Pen at the hospital but was not given one to take home    Reviewed all available labs, documents, and imaging available from [facility].       Developed swelling in upper lip and lower face 1-2 days after bee sting.  Aslo devleoepd hives and itching in the buttocks and sides.     Has taken lisinopril without any side effectsi snce 2015.   No prior history of allergic reaction to bee venom.       **I reviewed the information recorded in the patient's EPIC chart (including but not limited to medical history, surgical history, family history, problem list, medication list, and allergy list) and updated the information as indicated based on the patients reported information.         Review of Systems   Constitutional, HEENT, cardiovascular, pulmonary, gi and gu systems are negative, except as otherwise noted.      Objective    /84   Pulse 95   Temp 98.2  F (36.8  C) (Oral)   Ht 1.575 m (5' 2\")   Wt 83.6 kg (184 lb 4.8 oz)   LMP  (LMP Unknown)   SpO2 97%   Breastfeeding No   BMI 33.71 kg/m    Body mass index is 33.71 kg/m .  Physical Exam   GENERAL alert and no distress  EYES:  Normal sclera,conjunctiva, EOMI  HENT: oral and posterior pharynx without lesions or erythema, facies symmetric  NECK: Neck supple. No LAD, without thyroidmegaly.  RESP: Clear to ausculation bilaterally without wheezes or crackles. Normal BS in all fields.  CV: RRR normal S1S2 without murmurs, rubs or gallops.  LYMPH: no cervical lymph adenopathy appreciated  MS: extremities- no gross deformities of the visible extremities noted,   EXT:  no lower extremity edema  PSYCH: Alert " and oriented times 3; speech- coherent  SKIN:  No obvious significant skin lesions on visible portions of face

## 2023-08-16 NOTE — PATIENT INSTRUCTIONS
The episode of the swelling and rash was most likely due mainly to the bee sting.      Lisinopril (and all medications in that class) can potentially very rarely cause swelling of the lips, face, or tongue, but you had been on this medication for several years without any side effects.  I suspect that the bee sting iniated this allergic response more than just Lisinopril alone.      Nonetheless, stop the Lisinopril,  we will add it to your allergy list so you do not recevie ti again.      Change to a similar effective blood pressure medication:  Losartan HCTZ 50/12.5, 1 tablet daily in the morning.    This is a similar dose to your former Lisinopril and work just as well.      Blood pressure treatment goals are under 140/90 most of the time.       Any residual swelling in the lip should resolve with time.      Continue all other medications at the same doses.  Contact your usual pharmacy if you need refills.      Covid vaccines are now recommended annually.  Get the most updated Covid vaccine when it becomes available, consider getting this at the same time as the annual influenza vaccine.      Assuming things are going well, then follow up with me again in August 2024.

## 2023-08-28 ASSESSMENT — ASTHMA QUESTIONNAIRES: ACT_TOTALSCORE: 25

## 2024-02-19 DIAGNOSIS — I10 ESSENTIAL HYPERTENSION, BENIGN: ICD-10-CM

## 2024-02-20 RX ORDER — LOSARTAN POTASSIUM AND HYDROCHLOROTHIAZIDE 12.5; 5 MG/1; MG/1
1 TABLET ORAL DAILY
Qty: 90 TABLET | Refills: 1 | Status: SHIPPED | OUTPATIENT
Start: 2024-02-20 | End: 2024-08-28

## 2024-08-28 DIAGNOSIS — I10 ESSENTIAL HYPERTENSION, BENIGN: ICD-10-CM

## 2024-08-28 RX ORDER — LOSARTAN POTASSIUM AND HYDROCHLOROTHIAZIDE 12.5; 5 MG/1; MG/1
1 TABLET ORAL DAILY
Qty: 90 TABLET | Refills: 1 | Status: SHIPPED | OUTPATIENT
Start: 2024-08-28 | End: 2024-10-03

## 2024-09-15 DIAGNOSIS — E78.5 HYPERLIPIDEMIA WITH TARGET LDL LESS THAN 130: ICD-10-CM

## 2024-09-15 DIAGNOSIS — Z13.6 CARDIOVASCULAR SCREENING; LDL GOAL LESS THAN 130: Primary | ICD-10-CM

## 2024-09-15 DIAGNOSIS — R73.01 ELEVATED FASTING GLUCOSE: ICD-10-CM

## 2024-09-15 DIAGNOSIS — I10 ESSENTIAL HYPERTENSION, BENIGN: ICD-10-CM

## 2024-09-15 DIAGNOSIS — D50.9 IRON DEFICIENCY ANEMIA, UNSPECIFIED IRON DEFICIENCY ANEMIA TYPE: ICD-10-CM

## 2024-09-15 DIAGNOSIS — R73.03 PREDIABETES: ICD-10-CM

## 2024-09-15 DIAGNOSIS — E66.01 SEVERE OBESITY (BMI 35.0-35.9 WITH COMORBIDITY) (H): ICD-10-CM

## 2024-09-16 RX ORDER — ATORVASTATIN CALCIUM 20 MG/1
20 TABLET, FILM COATED ORAL EVERY EVENING
Qty: 90 TABLET | Refills: 3 | Status: SHIPPED | OUTPATIENT
Start: 2024-09-16 | End: 2024-10-03

## 2024-10-03 ENCOUNTER — OFFICE VISIT (OUTPATIENT)
Dept: INTERNAL MEDICINE | Facility: CLINIC | Age: 79
End: 2024-10-03
Payer: COMMERCIAL

## 2024-10-03 DIAGNOSIS — D50.9 IRON DEFICIENCY ANEMIA, UNSPECIFIED IRON DEFICIENCY ANEMIA TYPE: ICD-10-CM

## 2024-10-03 DIAGNOSIS — J45.30 MILD PERSISTENT ASTHMA WITHOUT COMPLICATION: ICD-10-CM

## 2024-10-03 DIAGNOSIS — Z23 HIGH PRIORITY FOR COVID-19 VACCINATION: ICD-10-CM

## 2024-10-03 DIAGNOSIS — Z23 NEED FOR INFLUENZA VACCINATION: ICD-10-CM

## 2024-10-03 DIAGNOSIS — J98.01 POST-INFECTION BRONCHOSPASM: ICD-10-CM

## 2024-10-03 DIAGNOSIS — E78.5 HYPERLIPIDEMIA WITH TARGET LDL LESS THAN 130: ICD-10-CM

## 2024-10-03 DIAGNOSIS — I10 ESSENTIAL HYPERTENSION, BENIGN: ICD-10-CM

## 2024-10-03 DIAGNOSIS — Z00.00 MEDICARE ANNUAL WELLNESS VISIT, SUBSEQUENT: Primary | ICD-10-CM

## 2024-10-03 DIAGNOSIS — E66.01 SEVERE OBESITY (BMI 35.0-35.9 WITH COMORBIDITY) (H): ICD-10-CM

## 2024-10-03 DIAGNOSIS — R73.01 ELEVATED FASTING GLUCOSE: ICD-10-CM

## 2024-10-03 LAB
ALT SERPL W P-5'-P-CCNC: 22 U/L (ref 0–50)
ANION GAP SERPL CALCULATED.3IONS-SCNC: 10 MMOL/L (ref 7–15)
BUN SERPL-MCNC: 23.9 MG/DL (ref 8–23)
CALCIUM SERPL-MCNC: 10.1 MG/DL (ref 8.8–10.4)
CHLORIDE SERPL-SCNC: 101 MMOL/L (ref 98–107)
CHOLEST SERPL-MCNC: 177 MG/DL
CREAT SERPL-MCNC: 0.76 MG/DL (ref 0.51–0.95)
EGFRCR SERPLBLD CKD-EPI 2021: 79 ML/MIN/1.73M2
FASTING STATUS PATIENT QL REPORTED: YES
FASTING STATUS PATIENT QL REPORTED: YES
GLUCOSE SERPL-MCNC: 111 MG/DL (ref 70–99)
HCO3 SERPL-SCNC: 28 MMOL/L (ref 22–29)
HDLC SERPL-MCNC: 58 MG/DL
HGB BLD-MCNC: 12.5 G/DL (ref 11.7–15.7)
LDLC SERPL CALC-MCNC: 88 MG/DL
NONHDLC SERPL-MCNC: 119 MG/DL
POTASSIUM SERPL-SCNC: 3.5 MMOL/L (ref 3.4–5.3)
SODIUM SERPL-SCNC: 139 MMOL/L (ref 135–145)
TRIGL SERPL-MCNC: 156 MG/DL

## 2024-10-03 PROCEDURE — 36415 COLL VENOUS BLD VENIPUNCTURE: CPT | Performed by: INTERNAL MEDICINE

## 2024-10-03 PROCEDURE — G0439 PPPS, SUBSEQ VISIT: HCPCS | Performed by: INTERNAL MEDICINE

## 2024-10-03 PROCEDURE — G0008 ADMIN INFLUENZA VIRUS VAC: HCPCS | Performed by: INTERNAL MEDICINE

## 2024-10-03 PROCEDURE — 80048 BASIC METABOLIC PNL TOTAL CA: CPT | Performed by: INTERNAL MEDICINE

## 2024-10-03 PROCEDURE — T1013 SIGN LANG/ORAL INTERPRETER: HCPCS | Mod: U3

## 2024-10-03 PROCEDURE — 91320 SARSCV2 VAC 30MCG TRS-SUC IM: CPT | Performed by: INTERNAL MEDICINE

## 2024-10-03 PROCEDURE — 90662 IIV NO PRSV INCREASED AG IM: CPT | Performed by: INTERNAL MEDICINE

## 2024-10-03 PROCEDURE — 80061 LIPID PANEL: CPT | Performed by: INTERNAL MEDICINE

## 2024-10-03 PROCEDURE — 84460 ALANINE AMINO (ALT) (SGPT): CPT | Performed by: INTERNAL MEDICINE

## 2024-10-03 PROCEDURE — 99214 OFFICE O/P EST MOD 30 MIN: CPT | Mod: 25 | Performed by: INTERNAL MEDICINE

## 2024-10-03 PROCEDURE — 90480 ADMN SARSCOV2 VAC 1/ONLY CMP: CPT | Performed by: INTERNAL MEDICINE

## 2024-10-03 PROCEDURE — 85018 HEMOGLOBIN: CPT | Performed by: INTERNAL MEDICINE

## 2024-10-03 RX ORDER — ALBUTEROL SULFATE 90 UG/1
2 INHALANT RESPIRATORY (INHALATION) EVERY 4 HOURS PRN
Qty: 18 G | Refills: 11 | Status: SHIPPED | OUTPATIENT
Start: 2024-10-03

## 2024-10-03 RX ORDER — ATORVASTATIN CALCIUM 20 MG/1
20 TABLET, FILM COATED ORAL EVERY EVENING
Qty: 90 TABLET | Refills: 3 | Status: SHIPPED | OUTPATIENT
Start: 2024-10-03

## 2024-10-03 RX ORDER — ASCORBIC ACID 250 MG
1000 TABLET,CHEWABLE ORAL DAILY
COMMUNITY

## 2024-10-03 RX ORDER — LOSARTAN POTASSIUM AND HYDROCHLOROTHIAZIDE 12.5; 5 MG/1; MG/1
1 TABLET ORAL DAILY
Qty: 90 TABLET | Refills: 1 | Status: SHIPPED | OUTPATIENT
Start: 2024-10-03

## 2024-10-03 NOTE — PROGRESS NOTES
Verlaine is not on my chart. She would like her labs results mailed to her home. Thank you.    Makeda Bryson  [facility]

## 2024-10-03 NOTE — PATIENT INSTRUCTIONS
"    We are rechecking your labs.  I will let you know if the results indicate any changes in your therapy.  Unless you hear otherwise, continue all medications at the same doses.  Contact your usual pharmacy if you need refills.     Assuming your labs look good, then continue all medications at the same doses.  Contact your usual pharmacy if you need refills.      Annual flu shot given today. Get this every fall     Most current Covid shot given today, get this every year.         Continue all medications at the same doses.  Contact your usual pharmacy if you need refills.        Return to see me in 1 year, schedule a follow up visit sooner if needed for anything else.  Use [facility] or Call [phone] to schedule the appointment with me.           5 GOALS TO PREVENT VASCULAR DISEASE:     1.  Aggressive blood pressure control, under 130/80 ideally.  Using medications if needed.    Your blood pressure is under good control    BP Readings from Last 4 Encounters:   08/16/23 134/84   08/11/23 101/47   08/03/23 128/64   07/29/23 123/61       2.  Aggressive LDL cholesterol (\"bad cholesterol\") lowering as indicated.    Your goal is an LDL under 130 for sure, preferably under 100.  (If you have diabetes or previous vascular disease, the the LDL goals would be under 100 for sure, preferably under 70.)    New guidelines identify four high-risk groups who could benefit from statins:   *people with pre-existing heart disease, such as those who have had a heart attack;   *people ages 40 to 75 who have diabetes of any type  *patients ages 40 to 75 with at least a 7.5% risk of developing cardiovascular disease over the next decade, according to a formula described in the guidelines  *patients with the sort of super-high cholesterol that sometimes runs in families, as evidenced by an LDL of 190 milligrams per deciliter or higher    Your cholesterol levels are well controlled.    Recent Labs   Lab Test 07/31/23  0804 03/01/22  0831 " "  CHOL 156 160   HDL 78 60   LDL 66 74   TRIG 61 128       --LDL (\"bad\" cholesterol): normal under 130, ideal under 100.  Best way to lower this is through better food choices ( lower fats, etc.), medication may be considered if indicated  --HDL (\"good\") cholesterol: (normal above 40 for men, above 50 for women).  Best way to improve the HDL level is through regular physical exercise.  There are no medications to raise HDL levels.   --Triglycerides (desirable under 150): triglycerides are more about metabolic issues, best way to improve this is through better diet choices, emphasizing reducing the intake of \"simple carbohydrates\" (e.g. White bread, white rice, pasta, noodles, potatoes, snack foods, regular soda, juices (except fresh squeezed), cakes, cookies, candy, etc.) as best possible. Medications may be considered for triglyceride levels routinely above 400.    3.  Aggressive diabetic prevention, screening and/or management.      You do not have diabetes as of the most recent blood tests.     4.  No smoking    5.  Consider daily preventative aspirin over age 50 if you have enough cardiac risk factors to place you at higher risk for the presence of vascular disease.    If you have any reason not to take aspirin such easy bruising or bleeding, stomach problems, other anticoagulant medications, or any other side effects, then you should not take Aspirin.     --Based on your current cardiac disease risk profile and/or age over 75, you do NOT need to take daily preventative aspirin.        Preventive Health Recommendations  Female Ages 75+    Yearly exam: See your health care provider every year in order to  Review health changes in your health history  Discuss preventive care.    Review and reevaluate any chronic medical conditions  Review and renew any regular prescription medicines if you take any.  Review and aggressively manage any significant risk factors for vascular disease    Routine pelvic exams and PAP " smears no longer indiacted over age 75.  You do not need a Pap test if your uterus was removed (hysterectomy) and you have not had cancer.  You should be tested each year for STDs (sexually transmitted diseases) if you're at risk for STDs.   Routine screening mammogram no longer indicated.  Routine colon cancer screening no longer recommended over age 75-80  Have a cholesterol test every 5 years, or more often if advised.  Have a diabetes test (fasting glucose) every three years. If you are at risk for diabetes, you should have this test more often.   If you are at risk for osteoporosis (brittle bone disease), think about having a bone density scan (DEXA).  Consider lung cancer screening with low-dose chest CT if you have any significant smoking history.        Vaccine recommendations:   Get a flu shot each fall.  Middle October is the optimal time to receive the flu shot.   Covid vaccines are now recommended annually.  Get the most updated Covid vaccine when it becomes available, consider getting this at the same time as the annual influenza vaccine.   Get a tetanus shot every 10 years. (Get this vaccine from a pharmacist in a pharmacy when you are over 65 on Medicare insurance)  Everyone over 65 should make sure to receive pneumonia vaccines to prevent the most common type of bacterial pneumonia: Pneumococcal pneumonia. There are now two you should receive - Pneumovax (PPSV 23) and Prevnar (PCV 20)  Strongly consider the Shingrix shingles vaccine to give you the best chance of avoiding future shingles infection (as many as 1 and 3 adults over age 50 may develop this condition in their lifetime).      --If you have Medicare insurance, investigate the cost and coverage for Shingrix shingles vaccines with a pharmacist at a pharmacy.  They can tell you the coverage and cost and then give it to you if the price is acceptable.    --Medicare sometimes does not cover these Shingrix shingles vaccines, and with Medicare  "insurance it is usually cheaper to receive this shingles vaccine from a pharmacist in a pharmacy rather than in our clinic.    --At this time, you only need the 2 Shingrix vaccines and then you are done.    Consider vaccination against Respiratory Syncytial Virus (RSV) infections, especially if you have active lung disease, history of smoking, and/or cardiac conditions.  The respiratory syncytial virus (RSV), is a common upper respiratory virus that causes severe inflammation in the airways, more than most upper respiratory viruses.   This vaccine is available at most pharmacies and clinics.  At this time, the RSV vaccine is a one time vaccine.    --If you are on Medicare insurance, you need to specifically get this vaccine from a pharmacist in a pharmacy for the best cost and to confirm coverage, it will be less expensive to get this there rather than from our clinic.       Nutrition:   Eat at least 5 servings of fruits and vegetables each day.  Eat whole-grain bread, whole-wheat pasta and brown rice instead of white grains and rice.  Talk to your provider about Calcium and Vitamin D.    --Calcium: aim for 1200 mg per day (any brand is fine)   --Vitamin D3 at least 1000 units per day (any brand is fine)       --Good Grains:  Oats, brown rice, Quinoa (these do not raise the blood sugar as much)     --Bad grains:  Anything made from wheat or white rice     (because these raise the blood sugars significantly, and the possible gluten issue from wheat for some people).      --Proteins:  Aim for \"lean proteins\" including chicken, fish, seafood, pork, turkey, and eggs (in moderation); Eat red meat only occasionally        Lifestyle  Exercise at least 150 minutes a week (30 minutes a day, 5 days a week). This will help you control your weight and prevent disease.  Limit alcohol to one drink per day.  No smoking.   Wear sunscreen to prevent skin cancer.   See your dentist every six months for an exam and cleaning.  See your " eye doctor every 1 to 2 years.

## 2024-10-03 NOTE — PROGRESS NOTES
Preventive Care Visit  [facility]  Chris Bhatia MD, Internal Medicine  Oct 3, 2024      SUBJECTIVE:   Marilia is a 79 year old, presenting for the following:  Medicare Visit        10/3/2024     9:51 AM   Additional Questions   Roomed by Kerrie HUNG CMA     Are you in the first 12 months of your Medicare coverage?  No    HPI        Have you ever done Advance Care Planning? (For example, a Health Directive, POLST, or a discussion with a medical provider or your loved ones about your wishes): Yes, advance care planning is on file.    Unable to do hearing test, she is already deaf.      Fall risk  Fallen 2 or more times in the past year?: No    Cognitive Screening   1) Repeat 3 items (Leader, Season, Table)    2) Clock draw: NORMAL  3) 3 item recall: Recalls 3 objects  Results: 3 items recalled: COGNITIVE IMPAIRMENT LESS LIKELY    Mini-CogTM Copyright TABITHA Singh. Licensed by the author for use in [facility]; reprinted with permission (kamryn@.[facility]). All rights reserved.      Do you have sleep apnea, excessive snoring or daytime drowsiness? : no    Reviewed and updated as needed this visit by clinical staff   Tobacco  Allergies  Meds              Reviewed and updated as needed this visit by Provider     Meds              Social History     Tobacco Use    Smoking status: Never    Smokeless tobacco: Never   Substance Use Topics    Alcohol use: No     Comment: Rare               No data to display              Do you have a current opioid prescription? No  Do you use any other controlled substances or medications that are not prescribed by a provider? None          1.)  Hypertension:  History of hypertension, on medication.  No reported side effects from medications.    Reviewed last 6 BP readings in chart:  BP Readings from Last 6 Encounters:   10/03/24 120/80   08/16/23 134/84   08/11/23 101/47   08/03/23 128/64   07/29/23 123/61   03/02/22 124/80     No active cardiac  complaints or symptoms with exercise.     2.)  Hyperlipidemia:  Has history of hyperlipidemia.    The patient is taking a medication for this.  Denies any significant side effects from his medication.      Latest labs reviewed:    Recent Labs   Lab Test 10/03/24  1024 07/31/23  0804   CHOL 177 156   HDL 58 78   LDL 88 66   TRIG 156* 61        Lab Results   Component Value Date    AST 19 07/07/2020         3.)  Asthma stable.  Has not had any recent breathing troubles beyond usual baseline.  Has not any recent acute respiratory events.  Using medication as directed with reported side effects        7/10/2020    10:09 AM 3/2/2022     8:37 AM 8/3/2023     9:17 AM   ACT Total Scores   ACT TOTAL SCORE (Goal Greater than or Equal to 20) 21 25 25   In the past 12 months, how many times did you visit the emergency room for your asthma without being admitted to the hospital? 0 0 0   In the past 12 months, how many times were you hospitalized overnight because of your asthma? 0 0 0          Current providers sharing in care for this patient include:   Patient Care Team:  Chris Bhatia MD as PCP - General (Internal Medicine)  Chris Bhatia MD as Assigned PCP    The following health maintenance items are reviewed in Epic and correct as of today:  Health Maintenance   Topic Date Due    DEXA  Never done    ASTHMA ACTION PLAN  Never done    RSV VACCINE (1 - 1-dose 75+ series) Never done    ASTHMA CONTROL TEST  02/03/2024    A1C  07/31/2024    ANNUAL REVIEW OF HM ORDERS  09/16/2025    MEDICARE ANNUAL WELLNESS VISIT  10/03/2025    ALT  10/03/2025    BMP  10/03/2025    LIPID  10/03/2025    FALL RISK ASSESSMENT  10/03/2025    HEMOGLOBIN  10/03/2025    GLUCOSE  10/03/2027    ADVANCE CARE PLANNING  08/28/2028    DTAP/TDAP/TD IMMUNIZATION (3 - Td or Tdap) 09/19/2029    PHQ-2 (once per calendar year)  Completed    INFLUENZA VACCINE  Completed    Pneumococcal Vaccine: 65+ Years  Completed    ZOSTER IMMUNIZATION   "Completed    COVID-19 Vaccine  Completed    HPV IMMUNIZATION  Aged Out    MENINGITIS IMMUNIZATION  Aged Out    RSV MONOCLONAL ANTIBODY  Aged Out    MAMMO SCREENING  Discontinued    COLORECTAL CANCER SCREENING  Discontinued         **I reviewed the information recorded in the patient's EPIC chart (including but not limited to medical history, surgical history, family history, problem list, medication list, and allergy list) and updated the information as indicated based on the patients reported information.         Pertinent mammograms are reviewed under the imaging tab.      Review of Systems     Review of Systems  Constitutional, neuro, ENT, endocrine, pulmonary, cardiac, gastrointestinal, genitourinary, musculoskeletal, integument and psychiatric systems are negative, except as otherwise noted.    OBJECTIVE:   /80   Pulse 88   Temp 98.6  F (37  C) (Temporal)   Resp 16   Ht 1.575 m (5' 2\")   Wt 83.9 kg (185 lb)   LMP  (LMP Unknown)   SpO2 97%   Breastfeeding No   BMI 33.84 kg/m     Estimated body mass index is 33.84 kg/m  as calculated from the following:    Height as of this encounter: 1.575 m (5' 2\").    Weight as of this encounter: 83.9 kg (185 lb).  Physical Exam  GENERAL alert and no distress  EYES:  Normal sclera,conjunctiva, EOMI  HENT: oral and posterior pharynx without lesions or erythema, facies symmetric  NECK: Neck supple. No LAD, without thyroidmegaly.  RESP: Clear to ausculation bilaterally without wheezes or crackles. Normal BS in all fields.  CV: RRR normal S1S2 without murmurs, rubs or gallops.  LYMPH: no cervical lymph adenopathy appreciated  MS: extremities- no gross deformities of the visible extremities noted,   EXT:  no lower extremity edema  PSYCH: Alert and oriented times 3; speech- coherent  SKIN:  No obvious significant skin lesions on visible portions of face     Diagnostic Test Results:  Labs reviewed in Epic    ASSESSMENT / PLAN:     (Z00.00) Medicare annual wellness " visit, subsequent  (primary encounter diagnosis)  Comment: Discussed cardiac disease risk factors and cardiac disease risk factor modification, including diabetes screening, blood pressure screening (and management if indicated), and cholesterol screening.   Reviewed immunzation guidelines, including pneumococcal vaccines, annual influenza, and shingles vaccines.   Discussed routine cancer screenings, including skin cancer, colon cancer screening for everyone until age 80, prostate cancer screening in men until age 75, mammogram and PAP/pelvic for women until age 75.   Recommended regular dentist visits to care for remaining teeth.   Recommended regular screening for vision and glaucoma.   Recommended safe driving and accident avoidance.    Counseling:             Patient has been advised of split billing requirements and indicates understanding: Yes   Plan:     (E66.01,  Z68.35) Severe obesity (BMI 35.0-35.9 with comorbidity) (H)  Comment: Obesity is contributing to HTN.  Current BMI is: Body mass index is 33.84 kg/m ..  Reviewed weight patterns.   Obesity as a biological preventable and treatable disease, which is associated with significantly increased risk of many acute and chronic health conditions.   Obesity has now been recognized as a chronic disease by the American Medical Association.  Obesity has serious co-morbidities associated with obesity including increased risk for hypertension, stroke, coronary artery disease, dyslipidemia, Type II diabetes, depression, sleep apnea, cancers of the colon, breast and endometrium, obstructive sleep apnea, osteoarthritis and female infertility.  Recommended regular aerobic exercise, recommended improved diet aiming at lowering amount of processed foods, lower sugars, moderation/reduction of simple carbs and fat in the diet, establishing more regular meal times, always eating breakfast, front loading some of the calories and adding more protein to the diet.     Referral  to Weight Loss Clinic for discussion of more aggressive measures for weight loss can be considered (including medical options (e.g. GLP-1, or appetite suppressants, etc.) or bariatric surgical procedures.   Plan:     (R73.01) Elevated fasting glucose  Comment: mild prediabetes, nothgin more than low cabr diet for now.   Plan:     (D50.9) Iron deficiency anemia, unspecified iron deficiency anemia type  Comment: recheck labs, history of mild iron deficiency anemia in past.   No obvisou boood loss now.   Plan: Hemoglobin            (E78.5) Hyperlipidemia with target LDL less than 130  Comment: This condition is currently controlled on the current medical regimen.  Continue current therapy.   Plan: ALT, Lipid panel reflex to direct LDL         Non-fasting, atorvastatin (LIPITOR) 20 MG         tablet            (I10) Essential hypertension, benign  Comment: This condition is currently controlled on the current medical regimen.  Continue current therapy.   Plan: BASIC METABOLIC PANEL,         losartan-hydrochlorothiazide (HYZAAR) 50-12.5         MG tablet            (J45.30) Mild persistent asthma without complication  Comment: This condition is currently controlled on the current medical regimen.  Continue current therapy.   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  Continue current therapy.   Plan: albuterol (PROAIR HFA/PROVENTIL HFA/VENTOLIN         HFA) 108 (90 Base) MCG/ACT inhaler            (Z23) High priority for COVID-19 vaccination  Comment:   Plan: COVID-19 12+ (PFIZER)            (Z23) Need for influenza vaccination  Comment:   Plan: INFLUENZA HIGH DOSE, TRIVALENT, PF (FLUZONE)               Patient has been advised of split billing requirements and indicates understanding: Yes      Counseling  Reviewed preventive health  counseling, as reflected in patient instructions       Regular exercise       Healthy diet/nutrition       Vision screening       Hearing screening       Dental care       Bladder control       Fall risk prevention       Immunizations  Vaccinated for: Covid-19 and Influenza           Routine Colorectal cancer screening no longer indicated.         She reports that she has never smoked. She has never used smokeless tobacco.      Appropriate preventive services were discussed with this patient, including applicable screening as appropriate for fall prevention, nutrition, physical activity, Tobacco-use cessation, weight loss and cognition.  Checklist reviewing preventive services available has been given to the patient.    Reviewed patients plan of care and provided an AVS. The Basic Care Plan (routine screening as documented in Health Maintenance) for Marilia meets the Care Plan requirement. This Care Plan has been established and reviewed with the Patient.          Signed Electronically by: Chris Bhatia MD    Identified Health Risks

## 2024-10-13 VITALS
HEIGHT: 62 IN | HEART RATE: 88 BPM | OXYGEN SATURATION: 97 % | SYSTOLIC BLOOD PRESSURE: 120 MMHG | BODY MASS INDEX: 34.04 KG/M2 | TEMPERATURE: 98.6 F | DIASTOLIC BLOOD PRESSURE: 80 MMHG | WEIGHT: 185 LBS | RESPIRATION RATE: 16 BRPM

## 2025-04-14 DIAGNOSIS — I10 ESSENTIAL HYPERTENSION, BENIGN: ICD-10-CM

## 2025-04-14 RX ORDER — LOSARTAN POTASSIUM AND HYDROCHLOROTHIAZIDE 12.5; 5 MG/1; MG/1
1 TABLET ORAL DAILY
Qty: 90 TABLET | Refills: 0 | Status: SHIPPED | OUTPATIENT
Start: 2025-04-14

## 2025-04-23 ENCOUNTER — OFFICE VISIT (OUTPATIENT)
Dept: URGENT CARE | Facility: URGENT CARE | Age: 80
End: 2025-04-23
Payer: COMMERCIAL

## 2025-04-23 VITALS
BODY MASS INDEX: 33.62 KG/M2 | SYSTOLIC BLOOD PRESSURE: 120 MMHG | DIASTOLIC BLOOD PRESSURE: 60 MMHG | RESPIRATION RATE: 20 BRPM | WEIGHT: 183.8 LBS | OXYGEN SATURATION: 98 % | TEMPERATURE: 98 F

## 2025-04-23 DIAGNOSIS — J30.9 ALLERGIC RHINITIS, UNSPECIFIED SEASONALITY, UNSPECIFIED TRIGGER: Primary | ICD-10-CM

## 2025-04-23 DIAGNOSIS — H69.93 DYSFUNCTION OF BOTH EUSTACHIAN TUBES: ICD-10-CM

## 2025-04-23 PROCEDURE — 99214 OFFICE O/P EST MOD 30 MIN: CPT | Performed by: PHYSICIAN ASSISTANT

## 2025-04-23 PROCEDURE — 3078F DIAST BP <80 MM HG: CPT | Performed by: PHYSICIAN ASSISTANT

## 2025-04-23 PROCEDURE — 3074F SYST BP LT 130 MM HG: CPT | Performed by: PHYSICIAN ASSISTANT

## 2025-04-23 RX ORDER — DESLORATADINE 5 MG/1
5 TABLET ORAL DAILY
Qty: 30 TABLET | Refills: 1 | Status: SHIPPED | OUTPATIENT
Start: 2025-04-23

## 2025-04-23 RX ORDER — PREDNISONE 20 MG/1
TABLET ORAL
Qty: 5 TABLET | Refills: 0 | Status: SHIPPED | OUTPATIENT
Start: 2025-04-23

## 2025-04-23 RX ORDER — FLUTICASONE PROPIONATE 50 MCG
1 SPRAY, SUSPENSION (ML) NASAL DAILY
Qty: 18.2 ML | Refills: 1 | Status: SHIPPED | OUTPATIENT
Start: 2025-04-23

## 2025-04-23 NOTE — PROGRESS NOTES
Urgent Care Clinic Visit    Chief Complaint   Patient presents with    Nasal Congestion     Sinus pressure                 4/23/2025    10:17 AM   Additional Questions   Roomed by mandie little   Accompanied by spencer

## 2025-04-23 NOTE — PROGRESS NOTES
Patient presents with:  Nasal Congestion: Sinus pressure    (J30.9) Allergic rhinitis, unspecified seasonality, unspecified trigger  (primary encounter diagnosis)  Comment:   Plan: desloratadine (CLARINEX) 5 MG tablet,         fluticasone (FLONASE) 50 MCG/ACT nasal spray          Stay on the clarinex (allergy pill) every night for 3-4 weeks.  The pharmacy may give you a different named allergy medication, but they all work the same way.      Flonase nasal spray: 2 sprays each nostril at bedtime for minimum of 2 weeks.      The throat pain is from the drainage in the back of the throat.  The clarinex (allergy medication)   will help stop this drainage.      (H69.93) Dysfunction of both eustachian tubes  Comment: ear discomfort due to nasal tissue swelling.  This contributes to the dizziness.    Plan: predniSONE (DELTASONE) 20 MG tablet    Prednisone: 1 pill a day for 3 days, then 1/2 pill daily for 4 days.     Follow up with Dr. Bhatia should symptoms persist or worsen.      The prednisone will raise your glucose levels for a bit, but then they will go back to normal.              At the end of the encounter, I discussed results, diagnosis, medications. Discussed red flags for immediate return to clinic/ER, as well as indications for follow up if no improvement. Patient understood and agreed to plan. Patient was stable for discharge     If not improving or if condition worsens, follow up with your Primary Care Provider      31 minutes spent by me on the date of the encounter doing chart review, review of test results, interpretation of tests, patient visit, documentation, and in communication with patient via [facility] video .        SUBJECTIVE:   Marilia Palma is a 79 year old female who presents with a runny nose, with throat pain for the past 3 days and feeling light headed since this morning.  No fevers noted.  Sneezing a lot.  No cough or wheezing.      PMH is significant for asthma versus COPD.      [facility]  video  used to communicate with patient today.      Patient Active Problem List   Diagnosis    Deaf    CARDIOVASCULAR SCREENING; LDL GOAL LESS THAN 130    ACP (advance care planning)    Hyperlipidemia with target LDL less than 130    Essential hypertension, benign    Elevated fasting glucose    Iron deficiency anemia, unspecified iron deficiency anemia type    Severe obesity (BMI 35.0-35.9 with comorbidity) (H)    Medicare annual wellness visit, subsequent    Mild persistent asthma without complication    Prediabetes    Serrated adenoma of colon         Past Medical History:   Diagnosis Date    Deaf 1945    H. pylori infection 03/27/2018    H. Pylori identified on EGD biopsy.  completed 10 day triple therapy.      HTN (hypertension) 2012    Hyperlipidemia LDL goal < 130 11/28/2012     pre-treatment    Obesity (BMI 30-39.9) 12/03/2012    BMI 37    Prediabetes 07/07/2020    A1C 5.9    Serrated adenoma of colon 03/27/2018    large (20 mm) sessile serrated adenoma removed         Current Outpatient Medications   Medication Sig Dispense Refill    Multiple Vitamins-Iron (DAILY-ELOISA/IRON/BETA-CAROTENE) TABS TAKE 1 TABLET BY MOUTH DAILY. (Patient not taking: Reported on 10/19/2020) 30 tablet 7     Social History     Tobacco Use    Smoking status: Never Smoker    Smokeless tobacco: Never Used   Substance Use Topics    Alcohol use: Not on file     Family History   Problem Relation Age of Onset    Diabetes Mother     Diabetes Father          ROS:    10 point ROS of systems including Constitutional, Eyes, Respiratory, Cardiovascular, Gastroenterology, Genitourinary, Integumentary, Muscularskeletal, Psychiatric ,neurological were all negative except for pertinent positives noted in my HPI       OBJECTIVE:  /60   Temp 98  F (36.7  C) (Oral)   Resp 20   Wt 83.4 kg (183 lb 12.8 oz)   LMP  (LMP Unknown)   SpO2 98%   BMI 33.62 kg/m    Physical Exam:  GENERAL APPEARANCE: healthy, alert and no  distress  EYES: EOMI,  PERRL, conjunctiva clear  HENT: ear canals and TM's normal albeit retracted.  Nose with boggy turbinates and clear coryza and mouth without ulcers, erythema or lesions  NECK: supple, nontender, no lymphadenopathy  RESP: lungs clear to auscultation - no rales, rhonchi or wheezes  CV: regular rates and rhythm, normal S1 S2, no murmur noted  NEURO: Normal strength and tone, sensory exam grossly normal,  normal speech and mentation  SKIN: no suspicious lesions or rashes

## 2025-04-23 NOTE — PATIENT INSTRUCTIONS
(J30.9) Allergic rhinitis, unspecified seasonality, unspecified trigger  (primary encounter diagnosis)  Comment:   Plan: desloratadine (CLARINEX) 5 MG tablet,         fluticasone (FLONASE) 50 MCG/ACT nasal spray          Stay on the clarinex (allergy pill) every night for 3-4 weeks.  The pharmacy may give you a different named allergy medication, but they all work the same way.      Flonase nasal spray: 2 sprays each nostril at bedtime for minimum of 2 weeks.      The throat pain is from the drainage in the back of the throat.  The clarinex (allergy medication)   will help stop this drainage.      (H69.93) Dysfunction of both eustachian tubes  Comment: ear discomfort due to nasal tissue swelling.  This contributes to the dizziness.    Plan: predniSONE (DELTASONE) 20 MG tablet    Prednisone: 1 pill a day for 3 days, then 1/2 pill daily for 4 days.     Follow up with Dr. Bhatia should symptoms persist or worsen.      The prednisone will raise your glucose levels for a bit, but then they will go back to normal.

## 2025-06-03 ENCOUNTER — HOSPITAL ENCOUNTER (EMERGENCY)
Facility: CLINIC | Age: 80
Discharge: HOME | End: 2025-06-03
Attending: EMERGENCY MEDICINE | Admitting: EMERGENCY MEDICINE
Payer: COMMERCIAL

## 2025-06-03 ENCOUNTER — APPOINTMENT (OUTPATIENT)
Dept: GENERAL RADIOLOGY | Facility: CLINIC | Age: 80
End: 2025-06-03
Attending: PHYSICIAN ASSISTANT
Payer: COMMERCIAL

## 2025-06-03 VITALS
DIASTOLIC BLOOD PRESSURE: 98 MMHG | SYSTOLIC BLOOD PRESSURE: 166 MMHG | HEART RATE: 109 BPM | TEMPERATURE: 96 F | RESPIRATION RATE: 24 BRPM | OXYGEN SATURATION: 92 %

## 2025-06-03 DIAGNOSIS — J45.901 ASTHMA EXACERBATION: ICD-10-CM

## 2025-06-03 LAB
ANION GAP SERPL CALCULATED.3IONS-SCNC: 9 MMOL/L (ref 7–15)
ATRIAL RATE - MUSE: 103 BPM
BASOPHILS # BLD AUTO: 0.1 10E3/UL (ref 0–0.2)
BASOPHILS NFR BLD AUTO: 1 %
BUN SERPL-MCNC: 18.4 MG/DL (ref 8–23)
CALCIUM SERPL-MCNC: 9.7 MG/DL (ref 8.8–10.4)
CHLORIDE SERPL-SCNC: 96 MMOL/L (ref 98–107)
CREAT SERPL-MCNC: 0.69 MG/DL (ref 0.51–0.95)
DIASTOLIC BLOOD PRESSURE - MUSE: NORMAL MMHG
EGFRCR SERPLBLD CKD-EPI 2021: 87 ML/MIN/1.73M2
EOSINOPHIL # BLD AUTO: 0.7 10E3/UL (ref 0–0.7)
EOSINOPHIL NFR BLD AUTO: 8 %
ERYTHROCYTE [DISTWIDTH] IN BLOOD BY AUTOMATED COUNT: 13.4 % (ref 10–15)
FLUAV RNA SPEC QL NAA+PROBE: NEGATIVE
FLUBV RNA RESP QL NAA+PROBE: NEGATIVE
GLUCOSE SERPL-MCNC: 119 MG/DL (ref 70–99)
HCO3 SERPL-SCNC: 29 MMOL/L (ref 22–29)
HCT VFR BLD AUTO: 35.6 % (ref 35–47)
HGB BLD-MCNC: 11.9 G/DL (ref 11.7–15.7)
HOLD SPECIMEN: NORMAL
IMM GRANULOCYTES # BLD: 0 10E3/UL
IMM GRANULOCYTES NFR BLD: 0 %
INTERPRETATION ECG - MUSE: NORMAL
LYMPHOCYTES # BLD AUTO: 1.3 10E3/UL (ref 0.8–5.3)
LYMPHOCYTES NFR BLD AUTO: 16 %
MCH RBC QN AUTO: 30.2 PG (ref 26.5–33)
MCHC RBC AUTO-ENTMCNC: 33.4 G/DL (ref 31.5–36.5)
MCV RBC AUTO: 90 FL (ref 78–100)
MONOCYTES # BLD AUTO: 0.4 10E3/UL (ref 0–1.3)
MONOCYTES NFR BLD AUTO: 5 %
NEUTROPHILS # BLD AUTO: 5.7 10E3/UL (ref 1.6–8.3)
NEUTROPHILS NFR BLD AUTO: 69 %
NRBC # BLD AUTO: 0 10E3/UL
NRBC BLD AUTO-RTO: 0 /100
NT-PROBNP SERPL-MCNC: <36 PG/ML (ref 0–624)
P AXIS - MUSE: 63 DEGREES
PLATELET # BLD AUTO: 185 10E3/UL (ref 150–450)
POTASSIUM SERPL-SCNC: 4 MMOL/L (ref 3.4–5.3)
PR INTERVAL - MUSE: 164 MS
QRS DURATION - MUSE: 72 MS
QT - MUSE: 344 MS
QTC - MUSE: 450 MS
R AXIS - MUSE: 24 DEGREES
RBC # BLD AUTO: 3.94 10E6/UL (ref 3.8–5.2)
RSV RNA SPEC NAA+PROBE: NEGATIVE
SARS-COV-2 RNA RESP QL NAA+PROBE: NEGATIVE
SODIUM SERPL-SCNC: 134 MMOL/L (ref 135–145)
SYSTOLIC BLOOD PRESSURE - MUSE: NORMAL MMHG
T AXIS - MUSE: 68 DEGREES
VENTRICULAR RATE- MUSE: 103 BPM
WBC # BLD AUTO: 8.2 10E3/UL (ref 4–11)

## 2025-06-03 PROCEDURE — 250N000011 HC RX IP 250 OP 636: Mod: JZ | Performed by: PHYSICIAN ASSISTANT

## 2025-06-03 PROCEDURE — 71046 X-RAY EXAM CHEST 2 VIEWS: CPT

## 2025-06-03 PROCEDURE — 36415 COLL VENOUS BLD VENIPUNCTURE: CPT | Performed by: PHYSICIAN ASSISTANT

## 2025-06-03 PROCEDURE — 99285 EMERGENCY DEPT VISIT HI MDM: CPT | Mod: 25

## 2025-06-03 PROCEDURE — 96374 THER/PROPH/DIAG INJ IV PUSH: CPT

## 2025-06-03 PROCEDURE — 85004 AUTOMATED DIFF WBC COUNT: CPT | Performed by: PHYSICIAN ASSISTANT

## 2025-06-03 PROCEDURE — 87637 SARSCOV2&INF A&B&RSV AMP PRB: CPT | Performed by: EMERGENCY MEDICINE

## 2025-06-03 PROCEDURE — 250N000009 HC RX 250: Performed by: PHYSICIAN ASSISTANT

## 2025-06-03 PROCEDURE — 84132 ASSAY OF SERUM POTASSIUM: CPT | Performed by: PHYSICIAN ASSISTANT

## 2025-06-03 PROCEDURE — 93005 ELECTROCARDIOGRAM TRACING: CPT

## 2025-06-03 PROCEDURE — 83880 ASSAY OF NATRIURETIC PEPTIDE: CPT | Performed by: PHYSICIAN ASSISTANT

## 2025-06-03 PROCEDURE — 94640 AIRWAY INHALATION TREATMENT: CPT

## 2025-06-03 RX ORDER — IPRATROPIUM BROMIDE AND ALBUTEROL SULFATE 2.5; .5 MG/3ML; MG/3ML
3 SOLUTION RESPIRATORY (INHALATION) ONCE
Status: COMPLETED | OUTPATIENT
Start: 2025-06-03 | End: 2025-06-03

## 2025-06-03 RX ORDER — PREDNISONE 20 MG/1
TABLET ORAL
Qty: 10 TABLET | Refills: 0 | Status: SHIPPED | OUTPATIENT
Start: 2025-06-03

## 2025-06-03 RX ORDER — METHYLPREDNISOLONE SODIUM SUCCINATE 125 MG/2ML
125 INJECTION INTRAMUSCULAR; INTRAVENOUS ONCE
Status: COMPLETED | OUTPATIENT
Start: 2025-06-03 | End: 2025-06-03

## 2025-06-03 RX ORDER — ALBUTEROL SULFATE 0.83 MG/ML
2.5 SOLUTION RESPIRATORY (INHALATION) EVERY 6 HOURS PRN
Qty: 90 ML | Refills: 0 | Status: SHIPPED | OUTPATIENT
Start: 2025-06-03

## 2025-06-03 RX ADMIN — METHYLPREDNISOLONE SODIUM SUCCINATE 125 MG: 125 INJECTION, POWDER, FOR SOLUTION INTRAMUSCULAR; INTRAVENOUS at 09:00

## 2025-06-03 RX ADMIN — IPRATROPIUM BROMIDE AND ALBUTEROL SULFATE 3 ML: .5; 3 SOLUTION RESPIRATORY (INHALATION) at 08:59

## 2025-06-03 ASSESSMENT — ACTIVITIES OF DAILY LIVING (ADL)
ADLS_ACUITY_SCORE: 41

## 2025-06-03 NOTE — DISCHARGE INSTRUCTIONS
You were seen today for a asthma exacerbation.  We will send you home with 5 days of prednisone to take for inflammation.  There was no sign of infection in your lungs.  You should follow-up with your primary care doctor for possible change to your inhaler medications.  We also sent you home with a prescription for nebulizer solution.  You can use this in place of your inhaler.  You can use this nebulizer every 6 hours.

## 2025-06-03 NOTE — ED PROVIDER NOTES
Emergency Department Note      History of Present Illness     Chief Complaint   Shortness of Breath      HPI     Patient presented with daughter today.  Daughter and a formal  was used for this visit.    Marilia Palma is a 80 year old female with past medical history of asthma and HFrEF who presents today with shortness of breath.  Patient reports that the last 3 days she has been having increased shortness of breath.  She endorses a dry cough associated with this shortness of breath.  She denies any fevers, congestion, chest pain/chest tightness.  The patient does have a history of asthma and states that she has had similar symptoms like this before when she is having an acute exacerbation.  The patient does endorse some increasing time with the windows open last couple of days.  She does have an albuterol inhaler which she used for the last couple days and once this morning.  She states that this helped mildly, but did not entirely resolve the symptoms.    Independent Historian   None    Review of External Notes   Reviewed emergency medicine note from 6/12/2019.  Patient presented with shortness of breath and diagnosed with a COPD exacerbation.  She was given nebs, IV steroids and a 2-week prednisone taper.  Per chart review, patient has never been diagnosed with COPD, but does have history of asthma.    Past Medical History     Medical History and Problem List   Past Medical History:   Diagnosis Date    Deaf 1945    H. pylori infection 03/27/2018    HTN (hypertension) 2012    Hyperlipidemia LDL goal < 130 11/28/2012    Obesity (BMI 30-39.9) 12/03/2012    Prediabetes 07/07/2020    Serrated adenoma of colon 03/27/2018       Medications   albuterol (PROVENTIL) (2.5 MG/3ML) 0.083% neb solution  predniSONE (DELTASONE) 20 MG tablet  ascorbic acid (VITAMIN C) 250 MG CHEW chewable tablet  ASPIRIN NOT PRESCRIBED (INTENTIONAL)  atorvastatin (LIPITOR) 20 MG tablet  desloratadine (CLARINEX) 5 MG  tablet  fluticasone (FLONASE) 50 MCG/ACT nasal spray  losartan-hydrochlorothiazide (HYZAAR) 50-12.5 MG tablet  VITAMIN D3 1000 units tablet        Surgical History   Past Surgical History:   Procedure Laterality Date    ESOPHAGOSCOPY, GASTROSCOPY, DUODENOSCOPY (EGD), COMBINED N/A 3/27/2018    Procedure: COMBINED ESOPHAGOSCOPY, GASTROSCOPY, DUODENOSCOPY (EGD), BIOPSY SINGLE OR MULTIPLE;;  Surgeon: George Miles MD;  Location:  GI       Physical Exam     Patient Vitals for the past 24 hrs:   BP Temp Temp src Pulse Resp SpO2   06/03/25 1108 -- -- -- -- -- 92 %   06/03/25 1105 -- -- -- -- -- 93 %   06/03/25 1058 (!) 166/98 -- -- 109 -- 92 %   06/03/25 1053 -- -- -- -- -- 94 %   06/03/25 1023 -- -- -- -- -- 94 %   06/03/25 0953 -- -- -- -- -- 94 %   06/03/25 0823 -- -- -- -- -- 92 %   06/03/25 0751 (!) 171/84 (!) 96  F (35.6  C) Temporal 118 24 93 %     Physical Exam  General: No acute distress  Head: normocephalic, atraumatic  Eyes: Conjunctiva normal  Throat: moist mucus membranes, postnasal drip present, dry cough intermittently  Neck: ROM normal  Cardiovascular: regular rate, regular rhythm, no murmurs, no LLE edema  Pulmonology: Mildly tachypneic, normal effort, expiratory wheezes present throughout lung fields, no crackles present  Abdomen: Soft, non-tender, no masses or organomegaly, Bowel sounds present  Musculoskeletal: moving upper and lower extremities symmetrically  Skin: Warm, dry  Neuro: Alert and oriented  Psych: normal mood and affect        Diagnostics     Lab Results   Labs Ordered and Resulted from Time of ED Arrival to Time of ED Departure   BASIC METABOLIC PANEL - Abnormal       Result Value    Sodium 134 (*)     Potassium 4.0      Chloride 96 (*)     Carbon Dioxide (CO2) 29      Anion Gap 9      Urea Nitrogen 18.4      Creatinine 0.69      GFR Estimate 87      Calcium 9.7      Glucose 119 (*)    NT-PROBNP - Normal    NT-proBNP <36     INFLUENZA A/B, RSV AND SARS-COV2 PCR - Normal    Influenza A  PCR Negative      Influenza B PCR Negative      RSV PCR Negative      SARS CoV2 PCR Negative     CBC WITH PLATELETS AND DIFFERENTIAL    WBC Count 8.2      RBC Count 3.94      Hemoglobin 11.9      Hematocrit 35.6      MCV 90      MCH 30.2      MCHC 33.4      RDW 13.4      Platelet Count 185      % Neutrophils 69      % Lymphocytes 16      % Monocytes 5      % Eosinophils 8      % Basophils 1      % Immature Granulocytes 0      NRBCs per 100 WBC 0      Absolute Neutrophils 5.7      Absolute Lymphocytes 1.3      Absolute Monocytes 0.4      Absolute Eosinophils 0.7      Absolute Basophils 0.1      Absolute Immature Granulocytes 0.0      Absolute NRBCs 0.0         Imaging   XR Chest 2 Views   Final Result   IMPRESSION: Unchanged atelectasis or scarring in the lung bases. No acute airspace opacity. No pleural effusion. Normal heart size and pulmonary vascularity.          EKG   ECG results from 06/03/25   EKG 12 lead     Value    Systolic Blood Pressure     Diastolic Blood Pressure     Ventricular Rate 103    Atrial Rate 103    ID Interval 164    QRS Duration 72        QTc 450    P Axis 63    R AXIS 24    T Axis 68    Interpretation ECG      Sinus tachycardia  Nonspecific T wave abnormality  Abnormal ECG  When compared with ECG of 11-Aug-2023 06:22,  No significant change was found  Confirmed by GENERATED REPORT, COMPUTER ([id number]),  Gissel Ashraf ([id number]) on 6/3/2025 8:38:22 AM  Read by me at 0835       Independent Interpretation   None    ED Course      Medications Administered   Medications   ipratropium - albuterol 0.5 mg/2.5 mg/3 mL (DUONEB) neb solution 3 mL (3 mLs Nebulization $Given 6/3/25 0859)   methylPREDNISolone Na Suc (solu-MEDROL) injection 125 mg (125 mg Intravenous $Given 6/3/25 0900)       Procedures   Procedures     Discussion of Management   None    ED Course   ED Course as of 06/03/25 1321   Tue Jun 03, 2025   1008 Recheck the patient.  Wheezing significantly improved.  Patient no longer  tachypneic       Additional Documentation  None    Medical Decision Making / Diagnosis     CMS Diagnoses: None    MIPS   None               MDM   Marilia Palma is a 80 year old female with past medical history of asthma and HFrEF who presents today with shortness of breath.  Upon arrival the patient was tachycardic and tachypneic, her oxygen saturations were above 90%.  On exam she does have significant wheezes throughout her lungs, so proceeded with Solu-Medrol dose and DuoNeb.  She did have a new onset cough, proceeded with influenza/COVID swab.  These were both negative.  Also proceeded with an chest x-ray due to this cough.  This was negative for any acute infiltrates.  Her CBC was negative.  BMP showed very mild hyponatremia.  Given her history of heavy breath, did proceed with a proBNP.  The patient did not seem fluid overloaded on physical exam.  This came back negative. Upon reassessment after 1 DuoNeb and Solu-Medrol dose, the patient was significantly improved.  The wheezing was no longer heard throughout the lungs, and patient was breathing more comfortably.  Upon arrival the patient was initially tachycardic and tachypneic, saturating above 90% on room air.  Upon recheck, the patient was no longer tachypneic, and her heart rate had dipped below 100 bpm.  She had continued to sat above 92%.  Patient states that she feels much improved.  The patient was comfortable with discharge to home.  Provided with a prescription for prednisone to take for 5 days.  We also presided a new prescription for nebulizer solution, as the patient states that this is easier for her to use than the inhaler.  We discussed that she can take this every 6 hours.  She should follow-up with her primary care doctor sometime in the next 2 weeks for further management of her asthma symptoms.  She should return to the emergency department for any worsening chest pain or shortness of breath.  The patient was in agreement with the plan  and all questions were answered.    Disposition   The patient was discharged.     Diagnosis     ICD-10-CM    1. Asthma exacerbation  J45.901            Discharge Medications   Discharge Medication List as of 6/3/2025 11:25 AM        START taking these medications    Details   albuterol (PROVENTIL) (2.5 MG/3ML) 0.083% neb solution Take 1 vial (2.5 mg) by nebulization every 6 hours as needed for shortness of breath, wheezing or cough., Disp-90 mL, R-0, E-Prescribe               SABRA Serna Ella, PA-C  06/03/25 1025

## 2025-06-03 NOTE — ED PROVIDER NOTES
ED APC SUPERVISION NOTE:   I evaluated this patient in conjunction with Dinah Coleman PA-C  I have participated in the care of the patient and personally performed key elements of the history, exam, and medical decision making.      HPI:   Marilia Palma is a 80 year old female with history of asthma/COPD and HFrEF who presents to the ED for evaluation of shortness of breath. Patient reports 3 days of worsening shortness of breath and difficulty breathing. Associated dry cough. She does note being outside more than usual. She reports taking her albuterol inhaler as needed over the past few days. She took the inhaler once this morning with little to no relief. Denies chest pain/chest tightness, or fevers. No known sick contacts. Denies any other associated symptoms.     Independent Historian:   None    Review of External Notes: I reviewed her office visit from April 23 of this year for sinus concerns.     EXAM:   Constitutional: Vital signs reviewed as above  General: Alert  HEENT: Moist mucous membranes  Eyes: Conjunctiva normal.   Neck: Normal range of motion  Cardiovascular: Tachycardic rate, Regular rhythm and normal heart sounds.  No MRG  Pulmonary/Chest: Tachypneic, diffuse expiratory wheezing, speaking full sentences, no crackles or rhonchi.  Abdominal: Soft. Positive bowel sounds. No MRG.  Musculoskeletal/Extremities: Full ROM.  Endo: No pitting edema  Neurological: Alert, no focal deficits.  Skin: Skin is warm and dry.   Psychiatric: Pleasant     Independent Interpretation (X-rays, CTs, rhythm strip):  CXR: No pneumothorax, infiltrate, pleural effusion, pulmonary edema, visible rib fracture, or cardiomegaly.    Consultations/Discussion of Management or Tests:  None     MIPS:      None    MEDICAL DECISION MAKING/ASSESSMENT AND PLAN:   Patient presents with the above-mentioned concerns.  She does have a history of asthma and is wheezing throughout all lung fields here.  Why do suspect this is an asthma  exacerbation a broad differential was considered.  We considered viral URI but her COVID/influenza/RSV were all negative.  Pneumonia was considered but her x-ray is unremarkable.  She does have a history of CHF but her BNP here is less than 36 and again no fluid or effusions on her x-ray.  Her CBC shows some minor abnormalities.  CBC was normal including a normal white count.  EKG was nondiagnostic.  She was feeling better after a DuoNeb and Solu-Medrol.  She will be discharged home and with albuterol meds and steroids.     DIAGNOSIS:     ICD-10-CM    1. Asthma exacerbation  J45.901             Scribe Disclosure:  Vanessa SEWELL, am serving as a scribe at 8:18 AM on 6/3/2025 to document services personally performed by Davian Amaral MD based on my observations and the provider's statements to me.   6/3/2025  [facility] EMERGENCY DEPT     Davian Amaral MD  06/03/25 0177

## 2025-06-11 ENCOUNTER — TELEPHONE (OUTPATIENT)
Dept: INTERNAL MEDICINE | Facility: CLINIC | Age: 80
End: 2025-06-11

## 2025-06-11 ENCOUNTER — OFFICE VISIT (OUTPATIENT)
Dept: INTERNAL MEDICINE | Facility: CLINIC | Age: 80
End: 2025-06-11
Payer: COMMERCIAL

## 2025-06-11 VITALS
SYSTOLIC BLOOD PRESSURE: 116 MMHG | DIASTOLIC BLOOD PRESSURE: 70 MMHG | WEIGHT: 181.3 LBS | TEMPERATURE: 98 F | RESPIRATION RATE: 16 BRPM | HEART RATE: 84 BPM | BODY MASS INDEX: 33.16 KG/M2 | OXYGEN SATURATION: 97 %

## 2025-06-11 DIAGNOSIS — J45.30 MILD PERSISTENT ASTHMA WITHOUT COMPLICATION: Primary | ICD-10-CM

## 2025-06-11 DIAGNOSIS — J45.30 MILD PERSISTENT ASTHMA WITHOUT COMPLICATION: ICD-10-CM

## 2025-06-11 DIAGNOSIS — Z23 NEED FOR VACCINATION: ICD-10-CM

## 2025-06-11 PROCEDURE — 90480 ADMN SARSCOV2 VAC 1/ONLY CMP: CPT | Performed by: NURSE PRACTITIONER

## 2025-06-11 PROCEDURE — 3074F SYST BP LT 130 MM HG: CPT | Performed by: NURSE PRACTITIONER

## 2025-06-11 PROCEDURE — 91320 SARSCV2 VAC 30MCG TRS-SUC IM: CPT | Performed by: NURSE PRACTITIONER

## 2025-06-11 PROCEDURE — 3078F DIAST BP <80 MM HG: CPT | Performed by: NURSE PRACTITIONER

## 2025-06-11 PROCEDURE — 99213 OFFICE O/P EST LOW 20 MIN: CPT | Mod: 25 | Performed by: NURSE PRACTITIONER

## 2025-06-11 PROCEDURE — G2211 COMPLEX E/M VISIT ADD ON: HCPCS | Performed by: NURSE PRACTITIONER

## 2025-06-11 RX ORDER — ALBUTEROL SULFATE 90 UG/1
2 INHALANT RESPIRATORY (INHALATION) EVERY 6 HOURS PRN
COMMUNITY

## 2025-06-11 ASSESSMENT — ASTHMA QUESTIONNAIRES
QUESTION_3 LAST FOUR WEEKS HOW OFTEN DID YOUR ASTHMA SYMPTOMS (WHEEZING, COUGHING, SHORTNESS OF BREATH, CHEST TIGHTNESS OR PAIN) WAKE YOU UP AT NIGHT OR EARLIER THAN USUAL IN THE MORNING: ONCE A WEEK
QUESTION_5 LAST FOUR WEEKS HOW WOULD YOU RATE YOUR ASTHMA CONTROL: WELL CONTROLLED
QUESTION_1 LAST FOUR WEEKS HOW MUCH OF THE TIME DID YOUR ASTHMA KEEP YOU FROM GETTING AS MUCH DONE AT WORK, SCHOOL OR AT HOME: SOME OF THE TIME
QUESTION_4 LAST FOUR WEEKS HOW OFTEN HAVE YOU USED YOUR RESCUE INHALER OR NEBULIZER MEDICATION (SUCH AS ALBUTEROL): ONE OR TWO TIMES PER DAY
EMERGENCY_ROOM_LAST_YEAR_TOTAL: TWO
ACT_TOTALSCORE: 16
QUESTION_2 LAST FOUR WEEKS HOW OFTEN HAVE YOU HAD SHORTNESS OF BREATH: ONCE OR TWICE A WEEK

## 2025-06-11 NOTE — TELEPHONE ENCOUNTER
Patient and  were in today, and the cost of the Arnuity here is higher than at [facility] where she was planning to get it originally. Can you cancel the Rx sent here and resend to the [facility] she was using before?    Thank you,  Craig Tamayo, PharmD  [facility]  Ph: [phone]

## 2025-06-11 NOTE — PATIENT INSTRUCTIONS
Stop by the pharmacy downstairs for the followin) See if the Arnuity Ellipta inhaler is cheaper via mail order pharmacy or in-person. I've sent it to the [facility] mail order pharmacy as a 3-month supply, which is typically cheaper. If [facility] pharmacy tells you differently, I can send it to a different pharmacy for in-person picke-up.  2) As to update your RSV vaccine.    START Arnuity Ellipta inhaler. 1 puff once daily. Rinse mouth after using to prevent thrush.  This is a CONTROLLER medication to help prevent asthma or COPD flares and help with chronic shortness of breath, wheezing. It may take a couple of weeks for you to notice significant improvement. This is normal.    CONTINUE albuterol inhaler, 2 puffs every 4-6 hours, as needed for shortness of breath, wheezing, chest tightness, and coughing.   This is a RESCUE inhaler to help with symptom management. Use regularly while you are having an asthma or COPD flare to improve your breathing symptoms.

## 2025-07-03 ENCOUNTER — DOCUMENTATION ONLY (OUTPATIENT)
Dept: EMERGENCY MEDICINE | Facility: CLINIC | Age: 80
End: 2025-07-03
Payer: COMMERCIAL

## 2025-07-03 DIAGNOSIS — J45.901 ASTHMA WITH ACUTE EXACERBATION, UNSPECIFIED ASTHMA SEVERITY, UNSPECIFIED WHETHER PERSISTENT: Primary | ICD-10-CM

## (undated) RX ORDER — FENTANYL CITRATE 50 UG/ML
INJECTION, SOLUTION INTRAMUSCULAR; INTRAVENOUS
Status: DISPENSED
Start: 2018-03-27